# Patient Record
Sex: MALE | Race: WHITE | Employment: FULL TIME | URBAN - METROPOLITAN AREA
[De-identification: names, ages, dates, MRNs, and addresses within clinical notes are randomized per-mention and may not be internally consistent; named-entity substitution may affect disease eponyms.]

---

## 2017-06-07 ENCOUNTER — OFFICE VISIT (OUTPATIENT)
Dept: INTERNAL MEDICINE CLINIC | Age: 57
End: 2017-06-07

## 2017-06-07 VITALS
WEIGHT: 228 LBS | RESPIRATION RATE: 16 BRPM | BODY MASS INDEX: 29.26 KG/M2 | DIASTOLIC BLOOD PRESSURE: 70 MMHG | SYSTOLIC BLOOD PRESSURE: 122 MMHG | TEMPERATURE: 98.5 F | HEIGHT: 74 IN | HEART RATE: 70 BPM

## 2017-06-07 DIAGNOSIS — Z00.00 ANNUAL PHYSICAL EXAM: Primary | ICD-10-CM

## 2017-06-07 LAB
BASOPHILS ABSOLUTE: 0 K/UL (ref 0–0.2)
BASOPHILS RELATIVE PERCENT: 0.7 %
BILIRUBIN, POC: NORMAL
BLOOD URINE, POC: NORMAL
CHOLESTEROL, TOTAL: 201 MG/DL (ref 0–199)
CLARITY, POC: CLEAR
COLOR, POC: YELLOW
EOSINOPHILS ABSOLUTE: 0.2 K/UL (ref 0–0.6)
EOSINOPHILS RELATIVE PERCENT: 3.8 %
GLUCOSE URINE, POC: NORMAL
HCT VFR BLD CALC: 44.2 % (ref 40.5–52.5)
HDLC SERPL-MCNC: 35 MG/DL (ref 40–60)
HEMOGLOBIN: 14.4 G/DL (ref 13.5–17.5)
KETONES, POC: NORMAL
LDL CHOLESTEROL CALCULATED: 107 MG/DL
LEUKOCYTE EST, POC: NORMAL
LYMPHOCYTES ABSOLUTE: 1.3 K/UL (ref 1–5.1)
LYMPHOCYTES RELATIVE PERCENT: 25.6 %
MCH RBC QN AUTO: 29.7 PG (ref 26–34)
MCHC RBC AUTO-ENTMCNC: 32.5 G/DL (ref 31–36)
MCV RBC AUTO: 91.4 FL (ref 80–100)
MONOCYTES ABSOLUTE: 0.4 K/UL (ref 0–1.3)
MONOCYTES RELATIVE PERCENT: 8.6 %
NEUTROPHILS ABSOLUTE: 3 K/UL (ref 1.7–7.7)
NEUTROPHILS RELATIVE PERCENT: 61.3 %
NITRITE, POC: NORMAL
PDW BLD-RTO: 13.8 % (ref 12.4–15.4)
PH, POC: 7
PLATELET # BLD: 196 K/UL (ref 135–450)
PMV BLD AUTO: 10.9 FL (ref 5–10.5)
PROTEIN, POC: NORMAL
RBC # BLD: 4.83 M/UL (ref 4.2–5.9)
SPECIFIC GRAVITY, POC: 1.02
TRIGL SERPL-MCNC: 294 MG/DL (ref 0–150)
TSH SERPL DL<=0.05 MIU/L-ACNC: 1.62 UIU/ML (ref 0.27–4.2)
UROBILINOGEN, POC: 1
VLDLC SERPL CALC-MCNC: 59 MG/DL
WBC # BLD: 4.9 K/UL (ref 4–11)

## 2017-06-07 PROCEDURE — 81002 URINALYSIS NONAUTO W/O SCOPE: CPT | Performed by: INTERNAL MEDICINE

## 2017-06-07 PROCEDURE — 90471 IMMUNIZATION ADMIN: CPT | Performed by: INTERNAL MEDICINE

## 2017-06-07 PROCEDURE — 90715 TDAP VACCINE 7 YRS/> IM: CPT | Performed by: INTERNAL MEDICINE

## 2017-06-07 PROCEDURE — 36415 COLL VENOUS BLD VENIPUNCTURE: CPT | Performed by: INTERNAL MEDICINE

## 2017-06-07 PROCEDURE — 99396 PREV VISIT EST AGE 40-64: CPT | Performed by: INTERNAL MEDICINE

## 2017-06-07 ASSESSMENT — ENCOUNTER SYMPTOMS
GASTROINTESTINAL NEGATIVE: 1
RESPIRATORY NEGATIVE: 1

## 2017-06-07 ASSESSMENT — PATIENT HEALTH QUESTIONNAIRE - PHQ9
SUM OF ALL RESPONSES TO PHQ QUESTIONS 1-9: 0
1. LITTLE INTEREST OR PLEASURE IN DOING THINGS: 0
SUM OF ALL RESPONSES TO PHQ9 QUESTIONS 1 & 2: 0
2. FEELING DOWN, DEPRESSED OR HOPELESS: 0

## 2017-06-09 LAB — PROSTATE SPECIFIC ANTIGEN: 0.95 NG/ML (ref 0–4)

## 2018-01-12 ENCOUNTER — OFFICE VISIT (OUTPATIENT)
Dept: INTERNAL MEDICINE CLINIC | Age: 58
End: 2018-01-12

## 2018-01-12 VITALS
RESPIRATION RATE: 12 BRPM | HEART RATE: 66 BPM | WEIGHT: 234 LBS | DIASTOLIC BLOOD PRESSURE: 72 MMHG | SYSTOLIC BLOOD PRESSURE: 118 MMHG | HEIGHT: 74 IN | TEMPERATURE: 97.8 F | OXYGEN SATURATION: 95 % | BODY MASS INDEX: 30.03 KG/M2

## 2018-01-12 DIAGNOSIS — J01.90 ACUTE BACTERIAL SINUSITIS: Primary | ICD-10-CM

## 2018-01-12 DIAGNOSIS — B96.89 ACUTE BACTERIAL SINUSITIS: Primary | ICD-10-CM

## 2018-01-12 PROCEDURE — 99213 OFFICE O/P EST LOW 20 MIN: CPT | Performed by: NURSE PRACTITIONER

## 2018-01-12 RX ORDER — GREEN TEA/HOODIA GORDONII 315-12.5MG
1 CAPSULE ORAL DAILY
Qty: 30 TABLET | Refills: 0 | Status: SHIPPED | OUTPATIENT
Start: 2018-01-12 | End: 2019-06-25

## 2018-01-12 RX ORDER — LEVOFLOXACIN 500 MG/1
500 TABLET, FILM COATED ORAL DAILY
Qty: 10 TABLET | Refills: 0 | Status: SHIPPED | OUTPATIENT
Start: 2018-01-12 | End: 2018-01-22

## 2018-01-12 ASSESSMENT — ENCOUNTER SYMPTOMS
SINUS PAIN: 1
EYE PAIN: 0
TROUBLE SWALLOWING: 0
SHORTNESS OF BREATH: 0
NAUSEA: 0
VOMITING: 0
CONSTIPATION: 0
SORE THROAT: 1
ABDOMINAL PAIN: 0
PHOTOPHOBIA: 0
DIARRHEA: 0
RHINORRHEA: 1
SINUS PRESSURE: 1
BACK PAIN: 0
WHEEZING: 0
COUGH: 0
CHEST TIGHTNESS: 0

## 2018-01-12 NOTE — PROGRESS NOTES
4.83     Hemoglobin 06/07/2017 14.4     Hematocrit 06/07/2017 44.2     MCV 06/07/2017 91.4     MCH 06/07/2017 29.7     MCHC 06/07/2017 32.5     RDW 06/07/2017 13.8     Platelets 20/88/8449 196     MPV 06/07/2017 10.9*    Neutrophils % 06/07/2017 61.3     Lymphocytes % 06/07/2017 25.6     Monocytes % 06/07/2017 8.6     Eosinophils % 06/07/2017 3.8     Basophils % 06/07/2017 0.7     Neutrophils # 06/07/2017 3.0     Lymphocytes # 06/07/2017 1.3     Monocytes # 06/07/2017 0.4     Eosinophils # 06/07/2017 0.2     Basophils # 06/07/2017 0.0     Cholesterol, Total 06/07/2017 201*    Triglycerides 06/07/2017 294*    HDL 06/07/2017 35*    LDL Calculated 06/07/2017 107*    VLDL Cholesterol Calcula* 06/07/2017 59     TSH 06/07/2017 1.62     PSA 06/09/2017 0.95          Physical Exam   Constitutional: He is oriented to person, place, and time. He appears well-developed and well-nourished. HENT:   Head: Normocephalic. Right Ear: No tenderness. Tympanic membrane is not erythematous. Left Ear: There is tenderness. Tympanic membrane is not erythematous. Nose: Right sinus exhibits maxillary sinus tenderness and frontal sinus tenderness. Left sinus exhibits maxillary sinus tenderness and frontal sinus tenderness. Mouth/Throat: No oropharyngeal exudate. Eyes: Pupils are equal, round, and reactive to light. Right eye exhibits no discharge. Neck: Normal range of motion. No JVD present. No tracheal deviation present. No thyromegaly present. Cardiovascular: Normal rate and regular rhythm. No murmur heard. Pulmonary/Chest: Breath sounds normal. No respiratory distress. He has no wheezes. He has no rales. He exhibits no tenderness. Abdominal: Soft. Bowel sounds are normal. He exhibits no distension and no mass. There is no tenderness. There is no rebound and no guarding. Musculoskeletal: Normal range of motion. He exhibits no edema or tenderness.    Lymphadenopathy:     He has no cervical

## 2018-01-17 ENCOUNTER — TELEPHONE (OUTPATIENT)
Dept: ORTHOPEDIC SURGERY | Age: 58
End: 2018-01-17

## 2018-02-02 ENCOUNTER — OFFICE VISIT (OUTPATIENT)
Dept: ORTHOPEDIC SURGERY | Age: 58
End: 2018-02-02

## 2018-02-02 VITALS
HEART RATE: 57 BPM | SYSTOLIC BLOOD PRESSURE: 119 MMHG | BODY MASS INDEX: 28.88 KG/M2 | DIASTOLIC BLOOD PRESSURE: 81 MMHG | WEIGHT: 225 LBS | HEIGHT: 74 IN

## 2018-02-02 DIAGNOSIS — M17.12 ARTHRITIS OF LEFT KNEE: ICD-10-CM

## 2018-02-02 DIAGNOSIS — G89.29 CHRONIC PAIN OF LEFT KNEE: Primary | ICD-10-CM

## 2018-02-02 DIAGNOSIS — M70.42 PREPATELLAR BURSITIS OF LEFT KNEE: ICD-10-CM

## 2018-02-02 DIAGNOSIS — M25.562 CHRONIC PAIN OF LEFT KNEE: Primary | ICD-10-CM

## 2018-02-02 PROCEDURE — 99203 OFFICE O/P NEW LOW 30 MIN: CPT | Performed by: ORTHOPAEDIC SURGERY

## 2018-02-02 ASSESSMENT — ENCOUNTER SYMPTOMS
BACK PAIN: 0
GASTROINTESTINAL NEGATIVE: 1
RESPIRATORY NEGATIVE: 1
EYES NEGATIVE: 1

## 2018-02-02 NOTE — PROGRESS NOTES
Subjective:      Patient ID: Yogesh Horton is a 62 y.o. male. LISA Horton presents today for evaluation of left knee pain. He has had knee arthroscopies in the past.  The right knee underwent arthroscopy in about 2009 the left some point in the 80s or 90s. He received a cortisone injection back in 2012 in the right knee. For the most part his knees function fine. 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 Typically he wears kneepads. In the end of November 2017 he did an installation in [place]. Upon his return to [place] he had significant left knee pain. He also noticed swelling. The pain has subsided but is concerned about the swelling. He says his knee looks \"nasty\". His swelling is anterior. He has not had treatment at all. He typically wears kneepads at work. He is otherwise healthy. Review of Systems   Constitutional: Negative. HENT: Negative. Eyes: Negative. Respiratory: Negative. Cardiovascular: Negative. Gastrointestinal: Negative. Endocrine: Negative. Genitourinary: Negative. Musculoskeletal: Positive for arthralgias. Negative for back pain and neck pain. Skin: Negative. Allergic/Immunologic: Negative for food allergies. Neurological: Negative. Hematological: Negative. Psychiatric/Behavioral: Negative. Objective:   Physical Exam  General Exam:    Vitals: Blood pressure 119/81, pulse 57, height 6' 2\" (1.88 m), weight 225 lb (102.1 kg). Constitutional: Patient is adequately groomed with no evidence of malnutrition  Mental Status: The patient is oriented to time, place and person. The patient's mood and affect are appropriate. Gait:  Patient walks with normal gait and station. Lymphatic: The lymphatic examination bilaterally reveals all areas to be without enlargement or induration. Vascular: Examination reveals no swelling or calf tenderness.   Peripheral pulses are palpable and 2+.  Neurological: The patient has good coordination. There is no weakness or sensory deficit. Skin:    Head/Neck: inspection reveals no rashes, ulcerations or lesions. Trunk:  inspection reveals no rashes, ulcerations or lesions. Right Lower Extremity: inspection reveals no rashes, ulcerations or lesions. Left Lower Extremity: inspection reveals no rashes, ulcerations or lesions. Examination of the bilateral hips reveals normal flexion and extension. There is no restriction in rotation. There is no tenderness to palpation anteriorly posteriorly or laterally. Right knee exam shows significant patellofemoral crepitation. He has varus alignment. He has no medial or lateral joint line pain. He is ligamentously stable. Calf is soft without DVT. There is no bursitis. Left knee shows some mild prepatellar bursitis. There is no warmth or erythema. He has moderate patellofemoral crepitation. His varus alignment. He has trace medial joint line discomfort. He has no lateral sided pain. He is ligamentously stable. Range of motion is not restricted. Calf soft without DVT. X-rays were obtained today. AP standing, PA flexed, and merchant views of the bilateral knees as well as a lateral of the left knee. These demonstrate:  Significant medial and patellofemoral arthritis of both knees. Assessment:      His pain is attributed to his arthritic change. The ongoing mild swelling is consistent with prepatellar bursitis        Plan: At this point, no bleeding intervention is warranted. I would not recommend an aspiration of the bursa as the swelling is mild and unlikely to provide significant improvement. He is currently asymptomatic from his arthritis. He will contact us if he feels the need for further intervention. He was reassured that he did not need to have his knee drained. He will follow up with me on an as-needed basis. This note was created using voice recognition software. It has been proofread, but occasionally errors remain. Please disregard these errors. They will be corrected as they are noted.

## 2018-03-24 ENCOUNTER — OFFICE VISIT (OUTPATIENT)
Dept: INTERNAL MEDICINE CLINIC | Age: 58
End: 2018-03-24

## 2018-03-24 VITALS
BODY MASS INDEX: 29.92 KG/M2 | TEMPERATURE: 98.2 F | SYSTOLIC BLOOD PRESSURE: 118 MMHG | HEART RATE: 64 BPM | DIASTOLIC BLOOD PRESSURE: 74 MMHG | OXYGEN SATURATION: 96 % | WEIGHT: 233 LBS

## 2018-03-24 DIAGNOSIS — H69.83 DYSFUNCTION OF BOTH EUSTACHIAN TUBES: Primary | ICD-10-CM

## 2018-03-24 DIAGNOSIS — R09.81 SINUS CONGESTION: ICD-10-CM

## 2018-03-24 DIAGNOSIS — R09.82 POST-NASAL DRIP: ICD-10-CM

## 2018-03-24 DIAGNOSIS — T70.0XXA: ICD-10-CM

## 2018-03-24 PROCEDURE — 99214 OFFICE O/P EST MOD 30 MIN: CPT | Performed by: INTERNAL MEDICINE

## 2018-03-24 RX ORDER — METHYLPREDNISOLONE 4 MG/1
TABLET ORAL
Qty: 1 KIT | Refills: 0 | Status: SHIPPED | OUTPATIENT
Start: 2018-03-24 | End: 2018-03-30

## 2018-03-24 RX ORDER — GUAIFENESIN AND PSEUDOEPHEDRINE HCL 1200; 120 MG/1; MG/1
1 TABLET, EXTENDED RELEASE ORAL 2 TIMES DAILY
Qty: 30 TABLET | Refills: 1 | Status: SHIPPED | OUTPATIENT
Start: 2018-03-24 | End: 2019-06-25

## 2018-03-24 ASSESSMENT — ENCOUNTER SYMPTOMS
SORE THROAT: 0
WHEEZING: 0
ABDOMINAL PAIN: 0
DIARRHEA: 0
SINUS PRESSURE: 1
VOMITING: 0
NAUSEA: 0
TROUBLE SWALLOWING: 0
RHINORRHEA: 1
SHORTNESS OF BREATH: 0

## 2018-03-24 NOTE — PROGRESS NOTES
4.0 - 11.0 K/uL Final    RBC 06/07/2017 4.83  4.20 - 5.90 M/uL Final    Hemoglobin 06/07/2017 14.4  13.5 - 17.5 g/dL Final    Hematocrit 06/07/2017 44.2  40.5 - 52.5 % Final    MCV 06/07/2017 91.4  80.0 - 100.0 fL Final    MCH 06/07/2017 29.7  26.0 - 34.0 pg Final    MCHC 06/07/2017 32.5  31.0 - 36.0 g/dL Final    RDW 06/07/2017 13.8  12.4 - 15.4 % Final    Platelets 75/65/7689 196  135 - 450 K/uL Final    MPV 06/07/2017 10.9* 5.0 - 10.5 fL Final    Neutrophils % 06/07/2017 61.3  % Final    Lymphocytes % 06/07/2017 25.6  % Final    Monocytes % 06/07/2017 8.6  % Final    Eosinophils % 06/07/2017 3.8  % Final    Basophils % 06/07/2017 0.7  % Final    Neutrophils # 06/07/2017 3.0  1.7 - 7.7 K/uL Final    Lymphocytes # 06/07/2017 1.3  1.0 - 5.1 K/uL Final    Monocytes # 06/07/2017 0.4  0.0 - 1.3 K/uL Final    Eosinophils # 06/07/2017 0.2  0.0 - 0.6 K/uL Final    Basophils # 06/07/2017 0.0  0.0 - 0.2 K/uL Final    Cholesterol, Total 06/07/2017 201* 0 - 199 mg/dL Final    Triglycerides 06/07/2017 294* 0 - 150 mg/dL Final    HDL 06/07/2017 35* 40 - 60 mg/dL Final    LDL Calculated 06/07/2017 107* <100 mg/dL Final    VLDL Cholesterol Calculated 06/07/2017 59  Not Established mg/dL Final    TSH 06/07/2017 1.62  0.27 - 4.20 uIU/mL Final    PSA 06/07/2017 0.95  0.00 - 4.00 ng/mL Final       Family History   Problem Relation Age of Onset    No Known Problems Mother     No Known Problems Father     No Known Problems Sister     No Known Problems Brother     No Known Problems Maternal Aunt     No Known Problems Maternal Uncle     No Known Problems Paternal Aunt     No Known Problems Paternal Uncle     No Known Problems Maternal Grandmother     No Known Problems Maternal Grandfather     No Known Problems Paternal Grandmother     No Known Problems Paternal Grandfather     No Known Problems Other     Anesth Problems Neg Hx     Broken Bones Neg Hx     Cancer Neg Hx     Clotting Disorder Neg Hx     Collagen Disease Neg Hx     Diabetes Neg Hx     Dislocations Neg Hx     Osteoporosis Neg Hx     Rheumatologic Disease Neg Hx     Scoliosis Neg Hx     Severe Sprains Neg Hx        Current Outpatient Prescriptions   Medication Sig Dispense Refill    methylPREDNISolone (MEDROL, JACINDA,) 4 MG tablet Take by mouth. 1 kit 0    Pseudoephedrine-guaiFENesin 120-1200 MG TB12 Take 1 tablet by mouth 2 times daily 30 tablet 1    Multiple Vitamins-Minerals (MULTIVITAMIN PO) Take 1 tablet by mouth daily.  Lactobacillus (PROBIOTIC ACIDOPHILUS) TABS Take 1 tablet by mouth daily 30 tablet 0     No current facility-administered medications for this visit. No Known Allergies    Review of Systems   Constitutional: Negative for chills, fatigue and fever. HENT: Positive for congestion, ear pain, postnasal drip, rhinorrhea, sinus pressure and tinnitus. Negative for sore throat and trouble swallowing. Eyes: Negative for visual disturbance. Respiratory: Negative for shortness of breath and wheezing. Cardiovascular: Negative for chest pain and palpitations. Gastrointestinal: Negative for abdominal pain, diarrhea, nausea and vomiting. Endocrine: Negative for cold intolerance and heat intolerance. Genitourinary: Negative for difficulty urinating and dysuria. Neurological: Negative for dizziness, weakness and numbness. Psychiatric/Behavioral: Negative for agitation, decreased concentration and suicidal ideas. The patient is not nervous/anxious. All other systems reviewed and are negative. Vitals:  /74 (Site: Right Arm, Cuff Size: Large Adult)   Pulse 64   Temp 98.2 °F (36.8 °C) (Oral)   Wt 233 lb (105.7 kg)   SpO2 96%   BMI 29.92 kg/m²     Physical Exam   Constitutional: He is oriented to person, place, and time. He appears well-developed and well-nourished. HENT:   Head: Normocephalic and atraumatic. Nose: Mucosal edema and rhinorrhea present.    Eyes: Conjunctivae and lids are

## 2019-06-24 ENCOUNTER — TELEPHONE (OUTPATIENT)
Dept: INTERNAL MEDICINE CLINIC | Age: 59
End: 2019-06-24

## 2019-06-24 NOTE — TELEPHONE ENCOUNTER
Attempted to contact patient on 6/24/2019. Result: Line busy x2. Pre-Visit planning not completed.            [facility]  Patient Services Specialist  [phone]

## 2019-06-25 ENCOUNTER — OFFICE VISIT (OUTPATIENT)
Dept: INTERNAL MEDICINE CLINIC | Age: 59
End: 2019-06-25
Payer: COMMERCIAL

## 2019-06-25 VITALS
BODY MASS INDEX: 29 KG/M2 | SYSTOLIC BLOOD PRESSURE: 106 MMHG | DIASTOLIC BLOOD PRESSURE: 68 MMHG | WEIGHT: 226 LBS | TEMPERATURE: 98.5 F | HEIGHT: 74 IN | OXYGEN SATURATION: 96 % | HEART RATE: 72 BPM

## 2019-06-25 DIAGNOSIS — E78.2 MIXED HYPERLIPIDEMIA: ICD-10-CM

## 2019-06-25 DIAGNOSIS — Z00.00 PHYSICAL EXAM: Primary | ICD-10-CM

## 2019-06-25 PROCEDURE — 99396 PREV VISIT EST AGE 40-64: CPT | Performed by: INTERNAL MEDICINE

## 2019-06-25 ASSESSMENT — ENCOUNTER SYMPTOMS
DIARRHEA: 1
RESPIRATORY NEGATIVE: 1

## 2019-07-25 ENCOUNTER — NURSE ONLY (OUTPATIENT)
Dept: INTERNAL MEDICINE CLINIC | Age: 59
End: 2019-07-25
Payer: COMMERCIAL

## 2019-07-25 DIAGNOSIS — Z12.5 PROSTATE CANCER SCREENING: ICD-10-CM

## 2019-07-25 DIAGNOSIS — Z00.00 PHYSICAL EXAM: Primary | ICD-10-CM

## 2019-07-25 DIAGNOSIS — E78.2 MIXED HYPERLIPIDEMIA: ICD-10-CM

## 2019-07-25 LAB
A/G RATIO: 2.1 (ref 1.1–2.2)
ALBUMIN SERPL-MCNC: 4.6 G/DL (ref 3.4–5)
ALP BLD-CCNC: 55 U/L (ref 40–129)
ALT SERPL-CCNC: 20 U/L (ref 10–40)
ANION GAP SERPL CALCULATED.3IONS-SCNC: 15 MMOL/L (ref 3–16)
AST SERPL-CCNC: 19 U/L (ref 15–37)
BASOPHILS ABSOLUTE: 0 K/UL (ref 0–0.2)
BASOPHILS RELATIVE PERCENT: 0.4 %
BILIRUB SERPL-MCNC: 0.3 MG/DL (ref 0–1)
BUN BLDV-MCNC: 15 MG/DL (ref 7–20)
CALCIUM SERPL-MCNC: 9.2 MG/DL (ref 8.3–10.6)
CHLORIDE BLD-SCNC: 102 MMOL/L (ref 99–110)
CHOLESTEROL, TOTAL: 160 MG/DL (ref 0–199)
CO2: 24 MMOL/L (ref 21–32)
CREAT SERPL-MCNC: 1.1 MG/DL (ref 0.9–1.3)
EOSINOPHILS ABSOLUTE: 0 K/UL (ref 0–0.6)
EOSINOPHILS RELATIVE PERCENT: 0.3 %
GFR AFRICAN AMERICAN: >60
GFR NON-AFRICAN AMERICAN: >60
GLOBULIN: 2.2 G/DL
GLUCOSE BLD-MCNC: 94 MG/DL (ref 70–99)
HCT VFR BLD CALC: 42.1 % (ref 40.5–52.5)
HDLC SERPL-MCNC: 38 MG/DL (ref 40–60)
HEMOGLOBIN: 14.4 G/DL (ref 13.5–17.5)
LDL CHOLESTEROL CALCULATED: 103 MG/DL
LYMPHOCYTES ABSOLUTE: 0.9 K/UL (ref 1–5.1)
LYMPHOCYTES RELATIVE PERCENT: 18.1 %
MCH RBC QN AUTO: 30.8 PG (ref 26–34)
MCHC RBC AUTO-ENTMCNC: 34.3 G/DL (ref 31–36)
MCV RBC AUTO: 90 FL (ref 80–100)
MONOCYTES ABSOLUTE: 0.8 K/UL (ref 0–1.3)
MONOCYTES RELATIVE PERCENT: 15 %
NEUTROPHILS ABSOLUTE: 3.5 K/UL (ref 1.7–7.7)
NEUTROPHILS RELATIVE PERCENT: 66.2 %
PDW BLD-RTO: 13.2 % (ref 12.4–15.4)
PLATELET # BLD: 162 K/UL (ref 135–450)
PMV BLD AUTO: 9.5 FL (ref 5–10.5)
POTASSIUM SERPL-SCNC: 4 MMOL/L (ref 3.5–5.1)
PROSTATE SPECIFIC ANTIGEN: 0.96 NG/ML (ref 0–4)
RBC # BLD: 4.68 M/UL (ref 4.2–5.9)
SODIUM BLD-SCNC: 141 MMOL/L (ref 136–145)
TOTAL PROTEIN: 6.8 G/DL (ref 6.4–8.2)
TRIGL SERPL-MCNC: 96 MG/DL (ref 0–150)
VLDLC SERPL CALC-MCNC: 19 MG/DL
WBC # BLD: 5.2 K/UL (ref 4–11)

## 2019-07-25 PROCEDURE — 36415 COLL VENOUS BLD VENIPUNCTURE: CPT | Performed by: INTERNAL MEDICINE

## 2019-09-13 ENCOUNTER — OFFICE VISIT (OUTPATIENT)
Dept: ORTHOPEDIC SURGERY | Age: 59
End: 2019-09-13
Payer: COMMERCIAL

## 2019-09-13 VITALS
DIASTOLIC BLOOD PRESSURE: 72 MMHG | SYSTOLIC BLOOD PRESSURE: 108 MMHG | HEIGHT: 74 IN | WEIGHT: 220 LBS | BODY MASS INDEX: 28.23 KG/M2 | HEART RATE: 69 BPM

## 2019-09-13 DIAGNOSIS — G89.29 CHRONIC PAIN OF LEFT KNEE: Primary | ICD-10-CM

## 2019-09-13 DIAGNOSIS — G89.29 CHRONIC PAIN OF RIGHT KNEE: ICD-10-CM

## 2019-09-13 DIAGNOSIS — M25.562 CHRONIC PAIN OF LEFT KNEE: Primary | ICD-10-CM

## 2019-09-13 DIAGNOSIS — M17.12 ARTHRITIS OF KNEE, LEFT: ICD-10-CM

## 2019-09-13 DIAGNOSIS — M25.561 CHRONIC PAIN OF RIGHT KNEE: ICD-10-CM

## 2019-09-13 DIAGNOSIS — M17.11 ARTHRITIS OF RIGHT KNEE: ICD-10-CM

## 2019-09-13 PROCEDURE — 20610 DRAIN/INJ JOINT/BURSA W/O US: CPT | Performed by: ORTHOPAEDIC SURGERY

## 2019-09-13 PROCEDURE — 99214 OFFICE O/P EST MOD 30 MIN: CPT | Performed by: ORTHOPAEDIC SURGERY

## 2019-09-13 RX ORDER — METHYLPREDNISOLONE ACETATE 40 MG/ML
80 INJECTION, SUSPENSION INTRA-ARTICULAR; INTRALESIONAL; INTRAMUSCULAR; SOFT TISSUE ONCE
Status: COMPLETED | OUTPATIENT
Start: 2019-09-13 | End: 2019-09-13

## 2019-09-13 RX ADMIN — METHYLPREDNISOLONE ACETATE 80 MG: 40 INJECTION, SUSPENSION INTRA-ARTICULAR; INTRALESIONAL; INTRAMUSCULAR; SOFT TISSUE at 08:48

## 2019-09-13 ASSESSMENT — ENCOUNTER SYMPTOMS
EYES NEGATIVE: 1
ALLERGIC/IMMUNOLOGIC NEGATIVE: 1
GASTROINTESTINAL NEGATIVE: 1
RESPIRATORY NEGATIVE: 1

## 2019-11-01 ENCOUNTER — TELEPHONE (OUTPATIENT)
Dept: ORTHOPEDIC SURGERY | Age: 59
End: 2019-11-01

## 2019-11-01 DIAGNOSIS — M17.11 ARTHRITIS OF RIGHT KNEE: ICD-10-CM

## 2019-11-01 DIAGNOSIS — M25.562 ACUTE PAIN OF LEFT KNEE: ICD-10-CM

## 2019-11-01 DIAGNOSIS — M25.561 ACUTE PAIN OF RIGHT KNEE: ICD-10-CM

## 2019-11-01 DIAGNOSIS — M17.12 ARTHRITIS OF KNEE, LEFT: Primary | ICD-10-CM

## 2019-11-07 ENCOUNTER — OFFICE VISIT (OUTPATIENT)
Dept: ORTHOPEDIC SURGERY | Age: 59
End: 2019-11-07
Payer: COMMERCIAL

## 2019-11-07 VITALS
HEIGHT: 74 IN | DIASTOLIC BLOOD PRESSURE: 84 MMHG | SYSTOLIC BLOOD PRESSURE: 129 MMHG | BODY MASS INDEX: 28.23 KG/M2 | WEIGHT: 220 LBS | HEART RATE: 58 BPM | TEMPERATURE: 97.6 F

## 2019-11-07 DIAGNOSIS — M17.11 ARTHRITIS OF RIGHT KNEE: ICD-10-CM

## 2019-11-07 DIAGNOSIS — M17.12 ARTHRITIS OF KNEE, LEFT: Primary | ICD-10-CM

## 2019-11-07 PROCEDURE — 99214 OFFICE O/P EST MOD 30 MIN: CPT | Performed by: ORTHOPAEDIC SURGERY

## 2019-11-21 ENCOUNTER — TELEPHONE (OUTPATIENT)
Dept: ORTHOPEDIC SURGERY | Age: 59
End: 2019-11-21

## 2019-12-11 ENCOUNTER — TELEPHONE (OUTPATIENT)
Dept: ORTHOPEDIC SURGERY | Age: 59
End: 2019-12-11

## 2020-01-06 ENCOUNTER — OFFICE VISIT (OUTPATIENT)
Dept: ORTHOPEDIC SURGERY | Age: 60
End: 2020-01-06
Payer: COMMERCIAL

## 2020-01-06 VITALS — TEMPERATURE: 97.4 F | WEIGHT: 220 LBS | BODY MASS INDEX: 28.23 KG/M2 | HEIGHT: 74 IN

## 2020-01-06 PROCEDURE — 20610 DRAIN/INJ JOINT/BURSA W/O US: CPT | Performed by: PHYSICIAN ASSISTANT

## 2020-01-06 PROCEDURE — 99213 OFFICE O/P EST LOW 20 MIN: CPT | Performed by: PHYSICIAN ASSISTANT

## 2020-01-06 NOTE — PROGRESS NOTES
This dictation was done with [employer] dictation and may contain mechanical errors related to translation. Temperature 97.4 °F (36.3 °C), temperature source Temporal, height 6' 2\" (1.88 m), weight 220 lb (99.8 kg). Subjective:  bilateral knee pain. He is here for  1st Eufflexxa injection for the  bilateral knee(s). Objective:   Temperature 97.4 °F (36.3 °C), temperature source Temporal, height 6' 2\" (1.88 m), weight 220 lb (99.8 kg). There is a milld joint effusion noted of the bilateral knee(s). There is moderate pain with range of motion testing. There is no significant  instability noted. Neuro exam grossly intact both lower extremities. Intact sensation to light touch. Motor exam 4+ to 5/5 in all major motor groups. Negative Ashton's sign. Skin is warm, dry and intact with out erythema or significant increased temperature around the knee joint(s). Assessment:  Degenerative Joint Disease of the bilateral Knee(s). Plan:  Discussed  injections including all  risks and benefits including increased pain, drug reaction, infection, bleeding, lack of improvement and  neurovascular injury. All the questions were answered. PROCEDURE NOTE:  PRE-PROCEDURE DIAGNOSIS: DJD knee  POST-PROCEDURE DIAGNOSIS: DJD knee    With the patient's permission, his bilaterally knee was prepped in standard sterile fashion with  Alcohol. The prefilled injection of the preferred Eufflexxa was injected into the bilaterally lateral joint space compartment without difficulty. The patient tolerated the procedure(s) well without difficulty. A band-aid was applied. POST-PROCEDURE INSTRUCTIONS GIVEN TO PATIENT: The patient was advised to ice the knee for 15-20 minutes to relieve any injection site related pain. Decrease activity for the next 24 to 48 hours.  May use prescription or OTC pain relievers as needed      FOLLOW-UP:   As directed or call or return to clinic if these symptoms worsen or fail to improve as anticipated. If at any time you are concerned you may contact the office for further instructions or care.

## 2020-01-13 ENCOUNTER — OFFICE VISIT (OUTPATIENT)
Dept: ORTHOPEDIC SURGERY | Age: 60
End: 2020-01-13
Payer: COMMERCIAL

## 2020-01-13 VITALS — TEMPERATURE: 97.5 F

## 2020-01-13 PROCEDURE — 20610 DRAIN/INJ JOINT/BURSA W/O US: CPT | Performed by: PHYSICIAN ASSISTANT

## 2020-01-13 NOTE — PROGRESS NOTES
This dictation was done with [employer] dictation and may contain mechanical errors related to translation. Temperature 97.5 °F (36.4 °C), temperature source Temporal.      Subjective:  bilateral knee pain. He is here for  2nd Eufflexxa injection for the  bilateral knee(s). Objective:   Temperature 97.5 °F (36.4 °C), temperature source Temporal.    There is a milld joint effusion noted of the bilateral knee(s). There is moderate pain with range of motion testing. There is no significant  instability noted. Neuro exam grossly intact both lower extremities. Intact sensation to light touch. Motor exam 4+ to 5/5 in all major motor groups. Negative Ashton's sign. Skin is warm, dry and intact with out erythema or significant increased temperature around the knee joint(s). Assessment:  Degenerative Joint Disease of the bilateral Knee(s). Plan:  Discussed  injections including all  risks and benefits including increased pain, drug reaction, infection, bleeding, lack of improvement and  neurovascular injury. All the questions were answered. PROCEDURE NOTE:  PRE-PROCEDURE DIAGNOSIS: DJD knee  POST-PROCEDURE DIAGNOSIS: DJD knee    With the patient's permission, his bilaterally knee was prepped in standard sterile fashion with  Alcohol. The prefilled injection of the preferred Eufflexxa was injected into the bilaterally lateral joint space compartment without difficulty. The patient tolerated the procedure(s) well without difficulty. A band-aid was applied. POST-PROCEDURE INSTRUCTIONS GIVEN TO PATIENT: The patient was advised to ice the knee for 15-20 minutes to relieve any injection site related pain. Decrease activity for the next 24 to 48 hours. May use prescription or OTC pain relievers as needed      FOLLOW-UP:   As directed or call or return to clinic if these symptoms worsen or fail to improve as anticipated.  If at any time you are concerned you may contact the office for further instructions or care.

## 2020-01-21 ENCOUNTER — OFFICE VISIT (OUTPATIENT)
Dept: ORTHOPEDIC SURGERY | Age: 60
End: 2020-01-21
Payer: COMMERCIAL

## 2020-01-21 VITALS — TEMPERATURE: 97.6 F

## 2020-01-21 PROCEDURE — 20610 DRAIN/INJ JOINT/BURSA W/O US: CPT | Performed by: PHYSICIAN ASSISTANT

## 2020-03-16 ENCOUNTER — OFFICE VISIT (OUTPATIENT)
Dept: ORTHOPEDIC SURGERY | Age: 60
End: 2020-03-16
Payer: COMMERCIAL

## 2020-03-16 VITALS — TEMPERATURE: 97.7 F

## 2020-03-16 PROCEDURE — 99214 OFFICE O/P EST MOD 30 MIN: CPT | Performed by: ORTHOPAEDIC SURGERY

## 2020-03-16 NOTE — PROGRESS NOTES
RAPT  RISK ASSESSMENT and PREDICTION TOOL    Name: Kelby Camacho  YOB: 1960  Surgeon: Dr Min Dejesus         Value Score    1). What is your age group? 50 - 65 years  = 2      66 - 75 years = 1     > 75 years = 0       Your score = 2   2). Gender? Male = 2     Female = 1       Your score = 2   3). How far on average can you walk? Two blocks or more (+/- rest) = 2    (a block is 200 teuiao=562 ft)  1 - 2 blocks (+/- rest) = 1     Housebound (most of time) = 0       Your score = 2   4). Which gait aid do you use? None = 2    (more often than not) Single-point stick = 1     Crutches/walker = 0       Your score = 2   5). Do you use community supports? None or one per week = 1    (home help, meals on wheels, district nursing) Two or more per week = 0       Your score = 1   6). Will you live with someone who can care for you after your operation? yes = 3     no = 0       Your score = 3    Your Total Score (out of 12) = 12       Key: Destination at discharge from acute care predicted by score.   Score < 6  = extended inpatient rehabilitation  Score 6 - 9  = additional intervention to discharge directly home (Rehab in the home)  Score > 9  = directly home      Patient's Preference Prediction Score Agreed destination   home 12 yes   Radha Ash  Date: 3/16/20

## 2020-03-16 NOTE — LETTER
[facility]  Surgery Scheduling Form:  [facility]    DEMOGRAPHICS:                                                                                                                Patient Name:  Mina He  Patient :  1960   Patient SS#:      Patient Phone:  [phone] (home) [phone] (work)                           Patient Address:  [address] [place] [address]    PCP:  Kiet Carter MD  Insurance:   Payor/Plan Subscr  Sex Relation Sub. Ins. ID Effective Group Num   1. Radha Washington* 1960 Male  [id number]                                    [id number]       DIAGNOSIS & PROCEDURE:                                                                                              Diagnosis:     Right arthritis knee      M17.11  Operation:  Right Total Knee Replacement robotic assisted   [id number]  Location:  [place] [address]  Surgeon:  Kiersten Espinoza. Olivia Laboy MD    SCHEDULING INFORMATION:                                                                                         .  Surgeon's Scheduling Instruction:  elective    Requested Date:    OR Time:   Patient Arrival Time:      OR Time Required:   80 MIN  Anesthesia:  Choice  Equipment: [place] TRACEE, advanced  Mini C-Arm:  No   Standard C-Arm:  No  Status:  Outpatient in bed                        COVID   NOT NEEDED   PAT Required:  Yes  Comments: NO femoral nerve block   ALLERGIES:Patient has no known allergies. Suzanne Ford.  Johny Wilson MD      3/16/20 2:38 PM  BILLING INFORMATION:                                                                                                     Procedure:       CPT Code Modifier      With Socorro Bautista, [address]            H&P AT:       PCP  XX                      URGENT CARE                    Merle Ash  RIGHT TOTAL KNEE REPLACEMENT   1960     PHYSICIANS ORDERS  HEIGHT:  6'2         WEIGHT:   439 ALLERGIES:Patient has no known allergies. SURG    11-4       JET VIRTUAL                               __________________________________________________________________  PRE-OP ORDERS:  ? CBC WITH DIFFERENTIAL                                                ? TYPE AND SCREEN                                                            ? HgB A1C                                                                               ? EKG                                                                                        ? NASAL CULUTRE MRSA  ? UAR/if positive repeat UAR on admission  ? BMP           ALBUMIN AND PREALBUMIN           VITAMIN D LEVELS  ? COAG PROFILE  ? SED RATE  ? PT/OT EVAL AND TEACHING  ? INSTRUCT PT TO STOP ALL NSAIDS, ASPIRIN, BLOOD THINNERS 7 DAYS PRIOR SURGERY  DAY OF SURGERY  ? CEFAZOLIN 2 GM IVPB; IF PATIENT WEIGHS > 80 KG AND SERUM CREATININE <2.5 mg/dl, GIVE 2 GM DOSE WITHIN 1 HOUR OF INCISION. ? IF THE PRE-OP NASAL CULTURE FOR MRSA WAS POSITIVE:   REPEAT NASAL SWAB ON ADMISSION AND ADMINISTER VANCOMYCIN 1 GM IVPB, REDUCE THE DOSE OF VANCOMYCIN  MG IVPB IF PT < 55 KG OR SERUM CREATININE > 2mg/dl; also to get Cefazolin 2 GM or wt based  ? All patients will receive preop Cefazolin 2 GM or wt based   ? APPLY KNEE HIGH ANTI-EMBOLIC AND PNEUMO-BOOTS TO UNOPERATIVE  LEG  ? CELEBREX  200 MG  ORALLY  DAY OF SURGERY  ? ROXICODONE 10MG  ORALLY DAY OF SURGERY  OTHER ORDERS:_______________________________________________________PHYSICIAN SIGNATURE: __ 3/16/20                                                                                                       2:38 PM  ________________________DATE:                          Supplemental Confidentiality & Payment Agreement & Supplemental HIPAA Notice for Shared Medical Appointments        During shared medical appointments, you will hear about other participants health issues and personal information.   As a matter of

## 2020-03-16 NOTE — LETTER
[facility]  Surgery Scheduling Form:  Critical access hospital    DEMOGRAPHICS:                                                                                                                Patient Name:  Ferdinand Niño  Patient :  1960   Patient SS#:      Patient Phone:  [phone] (home) [phone] (work)                           Patient Address:  [address] [place] [id number]    PCP:  Mateo Carranza MD  Insurance:   Payor/Plan Subscr  Sex Relation Sub. Ins. ID Effective Group Num   1. 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 Ravin Crockett MD    SCHEDULING INFORMATION:                                                                                         .  Surgeon's Scheduling Instruction:  Dec    Requested Date:    OR Time:   Patient Arrival Time:      OR Time Required:   90 min  Anesthesia:  Choice  Equipment: Jamilah TRACEE, advanced  Mini C-Arm:  No   Standard C-Arm:  No  Status:  Outpatient in bed                     COVID     PAT Required:  Yes  Comments: NO femoral nerve block   ALLERGIES:Patient has no known allergies. Shayna Whitney.  Danni Cavazos MD      3/16/20 2:39 PM  BILLING INFORMATION:                                                                                                     Procedure:       CPT Code Modifier            With Donny Velazquez, [address]      H&P AT:       PCP  XX                      URGENT CARE                    Ferdinand Niño  Left Total Knee Replacement robotic assisted                                                                           and manipulation right knee     1960     PHYSICIANS ORDERS  HEIGHT:   6'2             WEIGHT:   26      ALLERGIES:Patient has no known allergies. SURG       12-1    JET VIRTUAL                 __________________________________________________________________  PRE-OP ORDERS:  ? CBC WITH DIFFERENTIAL                                                ? TYPE AND SCREEN                                                            ? HgB A1C                                                                               ? EKG                                                                                        ? NASAL CULUTRE MRSA  ? UAR/if positive repeat UAR on admission  ? BMP           ALBUMIN AND PREALBUMIN           VITAMIN D LEVELS  ? COAG PROFILE  ? SED RATE  ? PT/OT EVAL AND TEACHING  ? INSTRUCT PT TO STOP ALL NSAIDS, ASPIRIN, BLOOD THINNERS 7 DAYS PRIOR SURGERY  DAY OF SURGERY  ? CEFAZOLIN 2 GM IVPB; IF PATIENT WEIGHS > 80 KG AND SERUM CREATININE <2.5 mg/dl, GIVE 2 GM DOSE WITHIN 1 HOUR OF INCISION. ? IF THE PRE-OP NASAL CULTURE FOR MRSA WAS POSITIVE:   REPEAT NASAL SWAB ON ADMISSION AND ADMINISTER VANCOMYCIN 1 GM IVPB, REDUCE THE DOSE OF VANCOMYCIN  MG IVPB IF PT < 55 KG OR SERUM CREATININE > 2mg/dl; also to get Cefazolin 2 GM or wt based  ? All patients will receive preop Cefazolin 2 GM or wt based   ? APPLY KNEE HIGH ANTI-EMBOLIC AND PNEUMO-BOOTS TO UNOPERATIVE  LEG  ? CELEBREX  200 MG  ORALLY  DAY OF SURGERY  ?  ROXICODONE 10MG  ORALLY DAY OF SURGERY  OTHER ORDERS:_______________________________________________________PHYSICIAN SIGNATURE: __ 3/16/20                                                                                                       2:39 PM  ________________________DATE:                          Supplemental Confidentiality & Payment Agreement & Supplemental HIPAA Patients Signature: ____________________________________________________         DATE: ____/____/___      Hernandez Kathryn / Support Persons Signature (if applicable) _________________________     DATE: __/__/__    **Each person will be asked to sign this commitment before each Shared Medical Appointment. Thank You ! SURGERY SCHEDULING TIMES                                                                                                                                                      Sadiq Quintero                                                                                       1960                                                                           SURGERY DATE: ___/___/___                                                                      SURGERY TIME:  ___:___ AM/PM                                                                      ARRIVAL TIME:   ___:___  AM/PM                                                                                                                        **PLEASE REPORT TO THE                                                       INFORMATION DESK IN THE MAIN LOBBY                                                          OF THE HOSPITAL.         [facility]                           JET INFO     PT NAME:  Jamilah Ash              Patient Phone:  [phone] (home) [phone] (work)                                          1960    PCP:  PCP:  Fe Beckman MD                APPT DATE:  ___/___/___      DATE:  3/16/20        H&P __________    LABS: _________                      NEEDED:  __________    EKG:   _________                     NEEDED:  __________      JET DATE:   _______/_______

## 2020-05-19 ENCOUNTER — HOSPITAL ENCOUNTER (OUTPATIENT)
Dept: PHYSICAL THERAPY | Age: 60
Setting detail: THERAPIES SERIES
Discharge: HOME | End: 2020-05-19
Payer: COMMERCIAL

## 2020-05-19 PROCEDURE — 97110 THERAPEUTIC EXERCISES: CPT

## 2020-05-19 PROCEDURE — 97161 PT EVAL LOW COMPLEX 20 MIN: CPT

## 2020-05-19 PROCEDURE — 97530 THERAPEUTIC ACTIVITIES: CPT

## 2020-05-19 NOTE — PLAN OF CARE
Outpatient Physical Therapy  [x] [facility]    Phone: [phone]   Fax: [phone]   [] [facility]  Phone: [phone]              Fax: [phone]  [] [facility]   Phone: [phone]   Fax: [phone]     To: Referring Practitioner: Dr. Broderick Drake      Patient: Chad Castaneda   : 1960   MRN: [id number]  Evaluation Date: 2020      Diagnosis Information:  · Diagnosis: B knee OA   · Treatment Diagnosis: knee pain, dec ROM, dec strength, dec amb      Physical Therapy Certification/Re-Certification Form  Dear Dr. Broderick Drake,   The following patient has been evaluated for physical therapy services and for therapy to continue, Medicare requires monthly physician review of the treatment plan. Please review the attached evaluation and/or summary of the patient's plan of care, and verify that you agree therapy should continue by signing the attached document and sending it back to our office. Plan of Care/Treatment to date:  [] Therapeutic Exercise    [] Modalities:  [] Therapeutic Activity     [] Ultrasound  [] Electrical Stimulation  [] Gait Training      [] Cervical Traction [] Lumbar Traction  [] Neuromuscular Re-education    [] Cold/hotpack [] Iontophoresis   [x] Instruction in HEP     Other:  [] Manual Therapy      []             [] Aquatic Therapy      []           ? Frequency/Duration: One session for Prehab evaluation and hep instruction  # Days per week: [x] 1 day # Weeks: [] 1 week [] 5 weeks     [] 2 days? [] 2 weeks [] 6 weeks     [] 3 days   [] 3 weeks [] 7 weeks     [] 4 days   [] 4 weeks [] 8 weeks    Rehab Potential: [] Excellent [x] Good [] Fair  [] Poor       Electronically signed by:  Penelope Morillo, [address]      If you have any questions or concerns, please don't hesitate to call.   Thank you for your referral.      Physician Signature:________________________________Date:__________________  By signing above, therapists plan is approved by physician

## 2020-05-19 NOTE — PROGRESS NOTES
Physical Therapy  Initial Assessment  Date: 2020  Patient Name: Luis Ibanez  MRN: [id number]  : 1960     Treatment Diagnosis: knee pain, dec ROM, dec strength, dec amb     Restrictions  Position Activity Restriction  Other position/activity restrictions: low fall risk     Subjective   General  Chart Reviewed: Yes  Patient assessed for rehabilitation services?: Yes  Additional Pertinent Hx: prior scope B knee  Family / Caregiver Present: No  Referring Practitioner: Dr. Nguyen aZrate  Referral Date : 20  Diagnosis: B knee OA  PT Visit Information  Onset Date: 20  PT Insurance Information: [employer] auth needed; 40 combo vpcy   Total # of Visits Approved: 1  Subjective  Subjective: Pt with ongoing h/o B knee pain worsening over the years. Pain is B knees and has a plan to have R TKR in 2020 and the L done in 2020. Pt c/o constant knee pain that varies with activities but can reach 8/10. Objective     Observation/Palpation  Observation: amb without AD     AROM RLE (degrees)  RLE General AROM: see Prehab form  AROM LLE (degrees)  LLE General AROM: see prehab form     Strength RLE  Comment: see Prehab form  Strength LLE  Comment: see prehab form         Assessment   Conditions Requiring Skilled Therapeutic Intervention  Body structures, Functions, Activity limitations: Decreased functional mobility ; Decreased high-level IADLs;Decreased ADL status; Decreased ROM; Decreased strength; Increased pain  Assessment: prior level of function: sleep, amb and all ADLs without inc pain; DX: B knee OA   Treatment Diagnosis: knee pain, dec ROM, dec strength, dec amb   Prognosis: Good  Decision Making: Low Complexity  History: see PMH  Exam: see eval  Clinical Presentation: stable   REQUIRES PT FOLLOW UP: No  Activity Tolerance  Activity Tolerance: Patient Tolerated treatment well         Plan   Plan  Times per week: 1 session for prehab hep     OutComes
TOTAL JOINT - PREHAB ASSESSMENT FORM    Date:  2020    Patient Name:  Ingrid Dan    :  1960  MEK:[id number]    Restrictions/Precautions: Position Activity Restriction  Other position/activity restrictions: low fall risk     Pertinent Medical History: Additional Pertinent Hx: prior scope B knee    Medical/Treatment Diagnosis Information:  · Diagnosis: B knee OA  · Treatment Diagnosis: knee pain, dec ROM, dec strength, dec amb     Insurance/Certification information:  PT Insurance Information: [employer] - [employer] auth needed; 40 combo vpcy   Physician Information:  Referring Practitioner: Dr. Braydon Lambert: [facility]     [] Total Hip Replacement [] Right  [] Left  DOS:   [x] Not yet scheduled  [x] Total Knee Replacement [x] Right  [] Left    [x] Prehab Eval  [] Prehab D/C  [] Post - OP Visit             Weeks from sx [] Post-op D/C    SUBJECTIVE:    Chart Reviewed: Yes  Patient assessed for rehabilitation services?: Yes  Referral Date : 20  Onset Date: 20  Subjective  Subjective: Pt with ongoing h/o B knee pain worsening over the years. Pain is B knees and has a plan to have R TKR in 2020 and the L done in 2020. Pt c/o constant knee pain that varies with activities but can reach 8/10. DME ASSESSMENT:   Current available equipment:    [] Std. Ashvin Templeton       [] Rolling walker      [] 4 wheeled walker     [] Epimenio Washington     [] Straight cane     [x] Crutches   [] Reacher            [] Sock Aid              [] Shower chair            [] Leg          [] Long handle shoe horn   [] Other:     Equipment needed at discharge from hospital:   [] Std. Walker       [x] Rolling walker      [] 4 wheeled walker     [] Epimenio Washington     [x] Straight cane     [] Crutches   [x] Reacher            [x] Sock Aid              [x] Shower chair            [x] Leg          [x] Long handle shoe horn   [] Other:                     SOCIAL ASSESSMENT:  1.
Other

## 2020-05-19 NOTE — FLOWSHEET NOTE
Physical Therapy Daily Treatment Note  Date:  2020    Patient Name:  Zoe Paul    :  1960  MRN: [id number]    Restrictions/Precautions: Position Activity Restriction  Other position/activity restrictions: low fall risk     Pertinent Medical History: Additional Pertinent Hx: prior scope B knee    Medical/Treatment Diagnosis Information:  · Diagnosis: B knee OA  · Treatment Diagnosis: knee pain, dec ROM, dec strength, dec amb     Insurance/Certification information:  PT Insurance Information: [employer] - [employer] auth needed; 40 combo vpcy   Physician Information:  Referring Practitioner: Dr. Rina Daniel of care signed (Y/N):  Sent to inbox    Visit# / total visits:   ( prehab )  Pain level: 8/10 max    Functional Outcomes Measure: at eval  Test: LEFS  Score: 44    History of Injury: Subjective  Subjective: Pt with ongoing h/o B knee pain worsening over the years. Pain is B knees and has a plan to have R TKR in 2020 and the L done in 2020. Pt c/o constant knee pain that varies with activities but can reach 8/10. Subjective:   See eval    Objective:   Observation:    Test measurements:      Exercises:  Exercise/Equipment Resistance/Repetitions Other comments             Prehab HEP Long sit HSS  Long sit calf str  Long sit QS  Heel slide with OP for flex  SLR  FAQ with red TB  Standing mini squats                                                               Other Therapeutic Activities:  Pt was educated on PT POC, Diagnosis, Prognosis, pathomechanics as well as frequency and duration of scheduling future physical therapy appointments. Time was also taken on this day to answer all patient questions and participation in PT. Reviewed appointment policy in detail with patient and patient verbalized understanding. Home Exercise Program: Patient was instructed in the above for HEP: pt without questions .  Patient verbalized/demonstrated understanding

## 2020-06-09 ENCOUNTER — TELEPHONE (OUTPATIENT)
Dept: ORTHOPEDIC SURGERY | Age: 60
End: 2020-06-09

## 2020-06-09 NOTE — TELEPHONE ENCOUNTER
Patient called back and would like to schedule. Right knee-2nd week in October  Let Naomi Lam Thanksgiving    I informed patient we would be in touch with him in the next few weeks for exact dates.

## 2020-06-09 NOTE — TELEPHONE ENCOUNTER
Called and LM for patient asking if he is ready to get back on the surgery schedule for total knee replacement.

## 2020-06-13 ENCOUNTER — HOSPITAL ENCOUNTER (EMERGENCY)
Age: 60
Discharge: HOME | End: 2020-06-13
Payer: COMMERCIAL

## 2020-06-13 ENCOUNTER — APPOINTMENT (OUTPATIENT)
Dept: CT IMAGING | Age: 60
End: 2020-06-13
Payer: COMMERCIAL

## 2020-06-13 VITALS
TEMPERATURE: 98.1 F | BODY MASS INDEX: 29.11 KG/M2 | HEIGHT: 74 IN | RESPIRATION RATE: 13 BRPM | OXYGEN SATURATION: 98 % | WEIGHT: 226.85 LBS | SYSTOLIC BLOOD PRESSURE: 120 MMHG | DIASTOLIC BLOOD PRESSURE: 75 MMHG | HEART RATE: 54 BPM

## 2020-06-13 LAB
A/G RATIO: 1.8 (ref 1.1–2.2)
ALBUMIN SERPL-MCNC: 4.2 G/DL (ref 3.4–5)
ALP BLD-CCNC: 52 U/L (ref 40–129)
ALT SERPL-CCNC: 13 U/L (ref 10–40)
ANION GAP SERPL CALCULATED.3IONS-SCNC: 8 MMOL/L (ref 3–16)
AST SERPL-CCNC: 15 U/L (ref 15–37)
BASOPHILS ABSOLUTE: 0 K/UL (ref 0–0.2)
BASOPHILS RELATIVE PERCENT: 0.4 %
BILIRUB SERPL-MCNC: 0.4 MG/DL (ref 0–1)
BILIRUBIN URINE: NEGATIVE
BLOOD, URINE: NEGATIVE
BUN BLDV-MCNC: 17 MG/DL (ref 7–20)
CALCIUM SERPL-MCNC: 8.5 MG/DL (ref 8.3–10.6)
CHLORIDE BLD-SCNC: 104 MMOL/L (ref 99–110)
CLARITY: CLEAR
CO2: 24 MMOL/L (ref 21–32)
COLOR: YELLOW
CREAT SERPL-MCNC: 0.9 MG/DL (ref 0.9–1.3)
EOSINOPHILS ABSOLUTE: 0.1 K/UL (ref 0–0.6)
EOSINOPHILS RELATIVE PERCENT: 1.8 %
EPITHELIAL CELLS, UA: 2 /HPF (ref 0–5)
GFR AFRICAN AMERICAN: >60
GFR NON-AFRICAN AMERICAN: >60
GLOBULIN: 2.3 G/DL
GLUCOSE BLD-MCNC: 117 MG/DL (ref 70–99)
GLUCOSE URINE: NEGATIVE MG/DL
HCT VFR BLD CALC: 44.2 % (ref 40.5–52.5)
HEMOGLOBIN: 14.7 G/DL (ref 13.5–17.5)
HYALINE CASTS: 3 /LPF (ref 0–8)
KETONES, URINE: NEGATIVE MG/DL
LEUKOCYTE ESTERASE, URINE: NEGATIVE
LIPASE: 25 U/L (ref 13–60)
LYMPHOCYTES ABSOLUTE: 0.9 K/UL (ref 1–5.1)
LYMPHOCYTES RELATIVE PERCENT: 18.1 %
MCH RBC QN AUTO: 30 PG (ref 26–34)
MCHC RBC AUTO-ENTMCNC: 33.3 G/DL (ref 31–36)
MCV RBC AUTO: 89.9 FL (ref 80–100)
MICROSCOPIC EXAMINATION: YES
MONOCYTES ABSOLUTE: 0.4 K/UL (ref 0–1.3)
MONOCYTES RELATIVE PERCENT: 7.3 %
NEUTROPHILS ABSOLUTE: 3.6 K/UL (ref 1.7–7.7)
NEUTROPHILS RELATIVE PERCENT: 72.4 %
NITRITE, URINE: NEGATIVE
PDW BLD-RTO: 13.4 % (ref 12.4–15.4)
PH UA: 5.5 (ref 5–8)
PLATELET # BLD: 183 K/UL (ref 135–450)
PMV BLD AUTO: 10 FL (ref 5–10.5)
POTASSIUM REFLEX MAGNESIUM: 4.6 MMOL/L (ref 3.5–5.1)
PROTEIN UA: ABNORMAL MG/DL
RBC # BLD: 4.91 M/UL (ref 4.2–5.9)
RBC UA: 2 /HPF (ref 0–4)
SODIUM BLD-SCNC: 136 MMOL/L (ref 136–145)
SPECIFIC GRAVITY UA: >1.03 (ref 1–1.03)
TOTAL PROTEIN: 6.5 G/DL (ref 6.4–8.2)
URINE REFLEX TO CULTURE: ABNORMAL
URINE TYPE: ABNORMAL
UROBILINOGEN, URINE: 0.2 E.U./DL
WBC # BLD: 5 K/UL (ref 4–11)
WBC UA: 1 /HPF (ref 0–5)

## 2020-06-13 PROCEDURE — 96374 THER/PROPH/DIAG INJ IV PUSH: CPT

## 2020-06-13 PROCEDURE — 81001 URINALYSIS AUTO W/SCOPE: CPT

## 2020-06-13 PROCEDURE — 83690 ASSAY OF LIPASE: CPT

## 2020-06-13 PROCEDURE — 6360000004 HC RX CONTRAST MEDICATION: Performed by: NURSE PRACTITIONER

## 2020-06-13 PROCEDURE — 80053 COMPREHEN METABOLIC PANEL: CPT

## 2020-06-13 PROCEDURE — 6360000002 HC RX W HCPCS: Performed by: NURSE PRACTITIONER

## 2020-06-13 PROCEDURE — 2580000003 HC RX 258: Performed by: NURSE PRACTITIONER

## 2020-06-13 PROCEDURE — 99283 EMERGENCY DEPT VISIT LOW MDM: CPT

## 2020-06-13 PROCEDURE — 96375 TX/PRO/DX INJ NEW DRUG ADDON: CPT

## 2020-06-13 PROCEDURE — 74177 CT ABD & PELVIS W/CONTRAST: CPT

## 2020-06-13 PROCEDURE — 85025 COMPLETE CBC W/AUTO DIFF WBC: CPT

## 2020-06-13 RX ORDER — KETOROLAC TROMETHAMINE 30 MG/ML
30 INJECTION, SOLUTION INTRAMUSCULAR; INTRAVENOUS ONCE
Status: COMPLETED | OUTPATIENT
Start: 2020-06-13 | End: 2020-06-13

## 2020-06-13 RX ORDER — 0.9 % SODIUM CHLORIDE 0.9 %
1000 INTRAVENOUS SOLUTION INTRAVENOUS ONCE
Status: COMPLETED | OUTPATIENT
Start: 2020-06-13 | End: 2020-06-13

## 2020-06-13 RX ORDER — MORPHINE SULFATE 4 MG/ML
4 INJECTION, SOLUTION INTRAMUSCULAR; INTRAVENOUS EVERY 30 MIN PRN
Status: DISCONTINUED | OUTPATIENT
Start: 2020-06-13 | End: 2020-06-13 | Stop reason: HOSPADM

## 2020-06-13 RX ORDER — ORPHENADRINE CITRATE 30 MG/ML
60 INJECTION INTRAMUSCULAR; INTRAVENOUS ONCE
Status: COMPLETED | OUTPATIENT
Start: 2020-06-13 | End: 2020-06-13

## 2020-06-13 RX ORDER — ONDANSETRON 2 MG/ML
4 INJECTION INTRAMUSCULAR; INTRAVENOUS EVERY 30 MIN PRN
Status: DISCONTINUED | OUTPATIENT
Start: 2020-06-13 | End: 2020-06-13 | Stop reason: HOSPADM

## 2020-06-13 RX ORDER — NAPROXEN 500 MG/1
500 TABLET ORAL 2 TIMES DAILY WITH MEALS
Qty: 30 TABLET | Refills: 0 | Status: SHIPPED | OUTPATIENT
Start: 2020-06-13 | End: 2020-06-15 | Stop reason: SDUPTHER

## 2020-06-13 RX ORDER — LIDOCAINE 50 MG/G
1 PATCH TOPICAL DAILY
Qty: 10 PATCH | Refills: 0 | Status: SHIPPED | OUTPATIENT
Start: 2020-06-13 | End: 2020-06-15 | Stop reason: SDUPTHER

## 2020-06-13 RX ORDER — METHOCARBAMOL 500 MG/1
500 TABLET, FILM COATED ORAL 4 TIMES DAILY
Qty: 40 TABLET | Refills: 0 | Status: SHIPPED | OUTPATIENT
Start: 2020-06-13 | End: 2020-06-15 | Stop reason: SDUPTHER

## 2020-06-13 RX ADMIN — IOPAMIDOL 75 ML: 755 INJECTION, SOLUTION INTRAVENOUS at 13:31

## 2020-06-13 RX ADMIN — ONDANSETRON 4 MG: 2 INJECTION INTRAMUSCULAR; INTRAVENOUS at 12:36

## 2020-06-13 RX ADMIN — HYDROMORPHONE HYDROCHLORIDE 1 MG: 1 INJECTION, SOLUTION INTRAMUSCULAR; INTRAVENOUS; SUBCUTANEOUS at 13:57

## 2020-06-13 RX ADMIN — SODIUM CHLORIDE 1000 ML: 9 INJECTION, SOLUTION INTRAVENOUS at 12:39

## 2020-06-13 RX ADMIN — KETOROLAC TROMETHAMINE 30 MG: 30 INJECTION, SOLUTION INTRAMUSCULAR at 12:36

## 2020-06-13 RX ADMIN — MORPHINE SULFATE 4 MG: 4 INJECTION, SOLUTION INTRAMUSCULAR; INTRAVENOUS at 12:54

## 2020-06-13 RX ADMIN — ORPHENADRINE CITRATE 60 MG: 30 INJECTION INTRAMUSCULAR; INTRAVENOUS at 13:59

## 2020-06-13 ASSESSMENT — PAIN DESCRIPTION - ORIENTATION
ORIENTATION: LEFT
ORIENTATION: LEFT

## 2020-06-13 ASSESSMENT — PAIN - FUNCTIONAL ASSESSMENT
PAIN_FUNCTIONAL_ASSESSMENT: PREVENTS OR INTERFERES WITH MANY ACTIVE NOT PASSIVE ACTIVITIES
PAIN_FUNCTIONAL_ASSESSMENT: PREVENTS OR INTERFERES WITH MANY ACTIVE NOT PASSIVE ACTIVITIES

## 2020-06-13 ASSESSMENT — PAIN SCALES - GENERAL
PAINLEVEL_OUTOF10: 9
PAINLEVEL_OUTOF10: 7
PAINLEVEL_OUTOF10: 7
PAINLEVEL_OUTOF10: 9
PAINLEVEL_OUTOF10: 3
PAINLEVEL_OUTOF10: 7
PAINLEVEL_OUTOF10: 7
PAINLEVEL_OUTOF10: 9
PAINLEVEL_OUTOF10: 3
PAINLEVEL_OUTOF10: 7

## 2020-06-13 ASSESSMENT — PAIN DESCRIPTION - PAIN TYPE
TYPE: ACUTE PAIN
TYPE: ACUTE PAIN

## 2020-06-13 ASSESSMENT — ENCOUNTER SYMPTOMS
ABDOMINAL PAIN: 0
COUGH: 0
VOMITING: 0
COLOR CHANGE: 0
NAUSEA: 1
SHORTNESS OF BREATH: 0
DIARRHEA: 0
WHEEZING: 0
BACK PAIN: 0

## 2020-06-13 ASSESSMENT — PAIN DESCRIPTION - FREQUENCY
FREQUENCY: CONTINUOUS
FREQUENCY: CONTINUOUS

## 2020-06-13 ASSESSMENT — PAIN DESCRIPTION - DESCRIPTORS: DESCRIPTORS: ACHING;THROBBING

## 2020-06-13 ASSESSMENT — PAIN DESCRIPTION - PROGRESSION
CLINICAL_PROGRESSION: GRADUALLY WORSENING
CLINICAL_PROGRESSION: GRADUALLY WORSENING

## 2020-06-13 ASSESSMENT — PAIN DESCRIPTION - LOCATION
LOCATION: BACK
LOCATION: FLANK

## 2020-06-13 NOTE — ED PROVIDER NOTES
additional complaints, no additional aggravating relieving factors. The patient presents awake, alert and in no acute respiratory distress or toxic appearance. PastMedical/Surgical History:  History reviewed. No pertinent past medical history. Procedure Laterality Date    HERNIA REPAIR      KNEE ARTHROSCOPY Left     KNEE ARTHROSCOPY Right        Medications:  Discharge Medication List as of 6/13/2020  3:29 PM      CONTINUE these medications which have NOT CHANGED    Details   Multiple Vitamins-Minerals (MULTIVITAMIN PO) Take 1 tablet by mouth daily. Review of Systems:  Review of Systems   Constitutional: Negative for chills and fever. HENT: Negative for congestion. Respiratory: Negative for cough, shortness of breath and wheezing. Cardiovascular: Negative for chest pain. Gastrointestinal: Positive for nausea. Negative for abdominal pain, diarrhea and vomiting. Genitourinary: Positive for flank pain. Negative for difficulty urinating, dysuria, frequency and hematuria. Patient reports left flank pain that is had for the past couple weeks however has gotten worse over the past 48 hours, has also developed nausea but no vomiting or diarrhea. He states his urine was very dark this morning however denies any additional urinary symptoms. Musculoskeletal: Negative for back pain. Skin: Negative for color change. Neurological: Negative for weakness, numbness and headaches. Positives and Pertinent negatives as per HPI. Except as noted above in the ROS, problem specific ROS was completed and is negative. Physical Exam:  Physical Exam  Vitals signs and nursing note reviewed. Constitutional:       Appearance: He is well-developed. He is not diaphoretic. HENT:      Head: Normocephalic. Right Ear: External ear normal.      Left Ear: External ear normal.   Eyes:      General: No scleral icterus. Right eye: No discharge. Left eye: No discharge.

## 2020-06-15 ENCOUNTER — OFFICE VISIT (OUTPATIENT)
Dept: INTERNAL MEDICINE CLINIC | Age: 60
End: 2020-06-15
Payer: COMMERCIAL

## 2020-06-15 VITALS
SYSTOLIC BLOOD PRESSURE: 118 MMHG | HEART RATE: 61 BPM | OXYGEN SATURATION: 95 % | RESPIRATION RATE: 16 BRPM | DIASTOLIC BLOOD PRESSURE: 78 MMHG | TEMPERATURE: 96.8 F | WEIGHT: 229 LBS | BODY MASS INDEX: 29.4 KG/M2

## 2020-06-15 PROCEDURE — 99214 OFFICE O/P EST MOD 30 MIN: CPT | Performed by: INTERNAL MEDICINE

## 2020-06-15 RX ORDER — NAPROXEN 500 MG/1
500 TABLET ORAL 2 TIMES DAILY WITH MEALS
Qty: 30 TABLET | Refills: 0 | Status: SHIPPED | OUTPATIENT
Start: 2020-06-15 | End: 2020-10-08 | Stop reason: ALTCHOICE

## 2020-06-15 RX ORDER — METHOCARBAMOL 500 MG/1
500 TABLET, FILM COATED ORAL 4 TIMES DAILY
Qty: 40 TABLET | Refills: 0 | Status: SHIPPED | OUTPATIENT
Start: 2020-06-15 | End: 2020-06-25

## 2020-06-15 RX ORDER — LIDOCAINE 50 MG/G
1 PATCH TOPICAL DAILY
Qty: 10 PATCH | Refills: 0 | Status: SHIPPED | OUTPATIENT
Start: 2020-06-15 | End: 2020-06-25

## 2020-06-15 ASSESSMENT — ENCOUNTER SYMPTOMS
VOMITING: 0
ABDOMINAL DISTENTION: 0
DIARRHEA: 0
COUGH: 0
CONSTIPATION: 0
BLOOD IN STOOL: 0
NAUSEA: 0
ABDOMINAL PAIN: 0
WHEEZING: 0
SHORTNESS OF BREATH: 0
BACK PAIN: 1
SORE THROAT: 0
EYE DISCHARGE: 0
CHEST TIGHTNESS: 0
COLOR CHANGE: 0

## 2020-06-15 ASSESSMENT — PATIENT HEALTH QUESTIONNAIRE - PHQ9
SUM OF ALL RESPONSES TO PHQ9 QUESTIONS 1 & 2: 0
1. LITTLE INTEREST OR PLEASURE IN DOING THINGS: 0
SUM OF ALL RESPONSES TO PHQ QUESTIONS 1-9: 0
SUM OF ALL RESPONSES TO PHQ QUESTIONS 1-9: 0
2. FEELING DOWN, DEPRESSED OR HOPELESS: 0

## 2020-06-16 ENCOUNTER — TELEPHONE (OUTPATIENT)
Dept: INTERNAL MEDICINE CLINIC | Age: 60
End: 2020-06-16

## 2020-06-16 ENCOUNTER — HOSPITAL ENCOUNTER (OUTPATIENT)
Dept: MRI IMAGING | Age: 60
Discharge: HOME | End: 2020-06-16
Payer: COMMERCIAL

## 2020-06-16 PROCEDURE — 72157 MRI CHEST SPINE W/O & W/DYE: CPT

## 2020-06-16 PROCEDURE — A9577 INJ MULTIHANCE: HCPCS | Performed by: INTERNAL MEDICINE

## 2020-06-16 PROCEDURE — 6360000004 HC RX CONTRAST MEDICATION: Performed by: INTERNAL MEDICINE

## 2020-06-16 PROCEDURE — 72158 MRI LUMBAR SPINE W/O & W/DYE: CPT

## 2020-06-16 RX ADMIN — GADOBENATE DIMEGLUMINE 20 ML: 529 INJECTION, SOLUTION INTRAVENOUS at 18:06

## 2020-06-16 NOTE — TELEPHONE ENCOUNTER
Reyes Freund from MRI said he does not see any indication for patient to have contrast. Please advise.

## 2020-06-16 NOTE — TELEPHONE ENCOUNTER
I am looking for disc heniation but also for any epidural plegmon although less likely  Please do the test as ordered

## 2020-06-17 NOTE — PROGRESS NOTES
Endocrine referral ordered for eval of thickening of the adrenal glands seen on CT of abdomen. Thank you for seeing my patient .
Musculoskeletal: Normal range of motion. No neck rigidity or muscular tenderness. Cardiovascular:      Rate and Rhythm: Normal rate and regular rhythm. Pulses: Normal pulses. Pulmonary:      Effort: Pulmonary effort is normal.      Breath sounds: Normal breath sounds. No wheezing or rhonchi. Abdominal:      General: Abdomen is flat. There is no distension. Palpations: Abdomen is soft. There is no mass. Tenderness: There is no abdominal tenderness. Musculoskeletal:         General: Tenderness present. Comments: Tenderness on the left side of the flank   Skin:     General: Skin is warm and dry. Neurological:      General: No focal deficit present. Mental Status: He is alert and oriented to person, place, and time. Sensory: No sensory deficit. Motor: No weakness. Comments: Intact sharp and dull sensations on the lateral part of the thigh   Psychiatric:         Mood and Affect: Mood normal.         Behavior: Behavior normal.         Thought Content: Thought content normal.         Judgment: Judgment normal.         ASSESSMENT/PLAN:  1. Acute left-sided low back pain, unspecified whether sciatica present  The pt is reporting numbness of the left lateral hip and thigh. Will continue with the naproxen, lidoderm patch and robaxin as well as Voltaren gel  - MRI [facility]; Future as well as MRI T spine to rule out any disc herniation       Return as scheduled.

## 2020-06-18 ENCOUNTER — TELEPHONE (OUTPATIENT)
Dept: INTERNAL MEDICINE CLINIC | Age: 60
End: 2020-06-18

## 2020-07-01 NOTE — TELEPHONE ENCOUNTER
General 06/17/2020  1:15 PM Dayanna Childress - -   Note    Called pt to schedule from referral . He doesn't  Feel he needs this appt at this time. AS              This was closed due to pt not needed an appt at this time .  Just an [facility]

## 2020-07-02 ENCOUNTER — TELEPHONE (OUTPATIENT)
Dept: INTERNAL MEDICINE CLINIC | Age: 60
End: 2020-07-02

## 2020-08-04 ENCOUNTER — TELEPHONE (OUTPATIENT)
Dept: INTERNAL MEDICINE CLINIC | Age: 60
End: 2020-08-04

## 2020-09-14 ENCOUNTER — PREP FOR PROCEDURE (OUTPATIENT)
Dept: ORTHOPEDIC SURGERY | Age: 60
End: 2020-09-14

## 2020-09-22 ENCOUNTER — OFFICE VISIT (OUTPATIENT)
Dept: INTERNAL MEDICINE CLINIC | Age: 60
End: 2020-09-22
Payer: COMMERCIAL

## 2020-09-22 VITALS
RESPIRATION RATE: 18 BRPM | TEMPERATURE: 97.5 F | HEART RATE: 69 BPM | OXYGEN SATURATION: 96 % | WEIGHT: 226 LBS | DIASTOLIC BLOOD PRESSURE: 72 MMHG | BODY MASS INDEX: 29.02 KG/M2 | SYSTOLIC BLOOD PRESSURE: 128 MMHG

## 2020-09-22 PROCEDURE — 99213 OFFICE O/P EST LOW 20 MIN: CPT | Performed by: NURSE PRACTITIONER

## 2020-09-22 NOTE — PROGRESS NOTES
Maurice Ash  YOB: 1960    @DOS@    Vitals:    09/22/20 1433   BP: 128/72   Site: Right Upper Arm   Position: Sitting   Cuff Size: Medium Adult   Pulse: 69   Resp: 18   Temp: 97.5 °F (36.4 °C)   SpO2: 96%   Weight: 226 lb (102.5 kg)      Wt Readings from Last 2 Encounters:   09/22/20 226 lb (102.5 kg)   06/15/20 229 lb (103.9 kg)     BP Readings from Last 3 Encounters:   09/22/20 128/72   06/15/20 118/78   06/13/20 120/75        Chief Complaint   Patient presents with    Pre-op Exam     right knee replacement, [facility], Dr Suze Plaza, oct 13     No Known Allergies  Current Outpatient Medications   Medication Sig Dispense Refill    naproxen (NAPROSYN) 500 MG tablet Take 1 tablet by mouth 2 times daily (with meals) (Patient not taking: Reported on 9/22/2020) 30 tablet 0    diclofenac sodium (VOLTAREN) 1 % GEL Apply 4 g topically 4 times daily (Patient not taking: Reported on 9/22/2020) 2 Tube 0    Multiple Vitamins-Minerals (MULTIVITAMIN PO) Take 1 tablet by mouth daily. No current facility-administered medications for this visit. This patient presents to the office today for a preoperative consultation at the request of surgeon, Dr Suze Plaza  who plans on performing right knee replacement on October 13 for right knee arthritis. Conservative therapy, including meds, has failed. Planned anesthesia is General.  The patient has the following known anesthesia issues: none . Patient has a bleeding risk of : no recent abnormal bleeding. Patient does not have objection to receiving blood products if needed. There is no problem list on file for this patient. History reviewed. No pertinent past medical history.   Past Surgical History:   Procedure Laterality Date    HERNIA REPAIR      KNEE ARTHROSCOPY Left     KNEE ARTHROSCOPY Right      Family History   Problem Relation Age of Onset    No Known Problems Mother     No Known Problems Father     No Known Problems Sister     No Known Problems Brother     No Known Problems Maternal Aunt     No Known Problems Maternal Uncle     No Known Problems Paternal Aunt     No Known Problems Paternal Uncle     No Known Problems Maternal Grandmother     No Known Problems Maternal Grandfather     No Known Problems Paternal Grandmother     No Known Problems Paternal Grandfather     No Known Problems Other     Anesth Problems Neg Hx     Broken Bones Neg Hx     Cancer Neg Hx     Clotting Disorder Neg Hx     Collagen Disease Neg Hx     Diabetes Neg Hx     Dislocations Neg Hx     Osteoporosis Neg Hx     Rheumatologic Disease Neg Hx     Scoliosis Neg Hx     Severe Sprains Neg Hx      Social History     Socioeconomic History    Marital status:      Spouse name: Not on file    Number of children: Not on file    Years of education: Not on file    Highest education level: Not on file   Occupational History    Occupation: outside    Social Needs    Financial resource strain: Not on file    Food insecurity     Worry: Not on file     Inability: Not on file   Easily[employer] needs     Medical: Not on file     Non-medical: Not on file   Tobacco Use    Smoking status: Current Some Day Smoker     Packs/day: 0.00     Years: 15.00     Pack years: 0.00     Types: Cigars    Smokeless tobacco: Never Used    Tobacco comment: rare   Substance and Sexual Activity    Alcohol use: Yes     Comment:  Occ     Drug use: No    Sexual activity: Yes     Partners: Female   Lifestyle    Physical activity     Days per week: Not on file     Minutes per session: Not on file    Stress: Not on file   Relationships    Social connections     Talks on phone: Not on file     Gets together: Not on file     Attends Yazidism service: Not on file     Active member of club or organization: Not on file     Attends meetings of clubs or organizations: Not on file     Relationship status: Not on file    Intimate partner violence     Fear of current or ex partner: Not on file     Emotionally abused: Not on file     Physically abused: Not on file     Forced sexual activity: Not on file   Other Topics Concern    Not on file   Social History Narrative    Not on file       Review of Systems  Pertinent items are noted in HPI. Physical Exam   Constitutional: Patient is oriented to person, place, and time. He appears well-developed and well-nourished. No distress. HEENT:   Head: Normocephalic and atraumatic. Right Ear: Tympanic membrane, external ear and ear canal normal.   Left Ear: Tympanic membrane, external ear and ear canal normal.   Nose: Nose normal.   Mouth/Throat: Oropharynx is clear and moist, and mucous membranes are normal.  There is no cervical adenopathy. There are no loose teeth. Eyes: Conjunctivae and extraocular motions are normal. Pupils are equal, round, and reactive to light bilaterally. Neck: Neck supple. No JVD present. Carotid bruit is not present. No mass and no thyromegaly present. Cardiovascular: Normal rate, regular rhythm, normal heart sounds and intact distal pulses. Exam reveals no gallop and no friction rub. No murmur heard. Pulmonary/Chest: Effort normal and breath sounds normal. No respiratory distress. There are no wheezes, rhonchi or rales. Abdominal: Soft, non-tender. Normal bowel sounds and aorta. There is no organomegaly, bruit or palpable mass. Neurological: He is alert and oriented to person, place, and time. He has normal reflexes. No cranial nerve deficit. Coordination normal.   Skin: Skin is warm and dry. There is no rash or erythema. No suspicious lesions noted. Psychiatric: He has a normal mood and affect. Speech and behavior are normal. Judgment, cognition and memory are normal.     EKG Interpretation:EKG to be interpret by Dr Jael Acosta to be drawn per DR Patience Whiting orders       Assessment:        61 y.o. patient with planned surgery as above.     Known risk factors for perioperative complications: None    [facility] Risk Stratification for noncardiac surgery:   Class: 1  Risk: low  Cleared for planned surgery pending labs/ekg ( labs and EKG to be reviewed by Dr Fabian Cassidy before surgery)   Current medications which may produce withdrawal symptoms if withheld perioperatively: none     Plan:     1. Preoperative workup as follows:per Dr David Olguin orders  2. Change in medication regimen before surgery: discontinue ASA 14d before surgery, discontinue NSAIDs 14d before surgery. Iza Chandler

## 2020-09-28 ENCOUNTER — TELEPHONE (OUTPATIENT)
Dept: INTERNAL MEDICINE CLINIC | Age: 60
End: 2020-09-28

## 2020-09-28 RX ORDER — AZITHROMYCIN 250 MG/1
250 TABLET, FILM COATED ORAL SEE ADMIN INSTRUCTIONS
Qty: 6 TABLET | Refills: 0 | Status: SHIPPED | OUTPATIENT
Start: 2020-09-28 | End: 2020-10-03

## 2020-10-01 ENCOUNTER — HOSPITAL ENCOUNTER (OUTPATIENT)
Dept: CT IMAGING | Age: 60
Discharge: HOME | End: 2020-10-01
Payer: COMMERCIAL

## 2020-10-01 PROCEDURE — 73700 CT LOWER EXTREMITY W/O DYE: CPT

## 2020-10-05 ENCOUNTER — TELEPHONE (OUTPATIENT)
Dept: ORTHOPEDIC SURGERY | Age: 60
End: 2020-10-05

## 2020-10-05 NOTE — TELEPHONE ENCOUNTER
EMAILED PATIENT COMPLETED APS FOR CROWN PLASTICS ALONG WITH HIS PORTION OF FORM THAT NEEDED COMPLETED TO [email or web address]

## 2020-10-07 ENCOUNTER — TELEPHONE (OUTPATIENT)
Dept: ORTHOPEDIC SURGERY | Age: 60
End: 2020-10-07

## 2020-10-07 ENCOUNTER — HOSPITAL ENCOUNTER (OUTPATIENT)
Age: 60
Discharge: HOME | End: 2020-10-07
Payer: COMMERCIAL

## 2020-10-07 ENCOUNTER — PREP FOR PROCEDURE (OUTPATIENT)
Dept: ORTHOPEDICS UNIT | Age: 60
End: 2020-10-07

## 2020-10-07 ENCOUNTER — OFFICE VISIT (OUTPATIENT)
Dept: PRIMARY CARE CLINIC | Age: 60
End: 2020-10-07
Payer: COMMERCIAL

## 2020-10-07 LAB
ABO/RH: NORMAL
ALBUMIN SERPL-MCNC: 4.3 G/DL (ref 3.4–5)
ANION GAP SERPL CALCULATED.3IONS-SCNC: 11 MMOL/L (ref 3–16)
ANTIBODY SCREEN: NORMAL
APTT: 30.1 SEC (ref 24.2–36.2)
BASOPHILS ABSOLUTE: 0 K/UL (ref 0–0.2)
BASOPHILS RELATIVE PERCENT: 0.7 %
BILIRUBIN URINE: NEGATIVE
BLOOD, URINE: NEGATIVE
BUN BLDV-MCNC: 13 MG/DL (ref 7–20)
CALCIUM SERPL-MCNC: 9.1 MG/DL (ref 8.3–10.6)
CHLORIDE BLD-SCNC: 107 MMOL/L (ref 99–110)
CLARITY: CLEAR
CO2: 24 MMOL/L (ref 21–32)
COLOR: YELLOW
CREAT SERPL-MCNC: 0.9 MG/DL (ref 0.9–1.3)
EKG ATRIAL RATE: 57 BPM
EKG DIAGNOSIS: NORMAL
EKG P AXIS: 57 DEGREES
EKG P-R INTERVAL: 138 MS
EKG Q-T INTERVAL: 424 MS
EKG QRS DURATION: 92 MS
EKG QTC CALCULATION (BAZETT): 412 MS
EKG R AXIS: 43 DEGREES
EKG T AXIS: 27 DEGREES
EKG VENTRICULAR RATE: 57 BPM
EOSINOPHILS ABSOLUTE: 0.1 K/UL (ref 0–0.6)
EOSINOPHILS RELATIVE PERCENT: 1.8 %
ESTIMATED AVERAGE GLUCOSE: 111.2 MG/DL
GFR AFRICAN AMERICAN: >60
GFR NON-AFRICAN AMERICAN: >60
GLUCOSE BLD-MCNC: 95 MG/DL (ref 70–99)
GLUCOSE URINE: NEGATIVE MG/DL
HBA1C MFR BLD: 5.5 %
HCT VFR BLD CALC: 43.9 % (ref 40.5–52.5)
HEMOGLOBIN: 14.9 G/DL (ref 13.5–17.5)
INR BLD: 0.98 (ref 0.86–1.14)
KETONES, URINE: NEGATIVE MG/DL
LEUKOCYTE ESTERASE, URINE: NEGATIVE
LYMPHOCYTES ABSOLUTE: 1.4 K/UL (ref 1–5.1)
LYMPHOCYTES RELATIVE PERCENT: 37.8 %
MCH RBC QN AUTO: 30 PG (ref 26–34)
MCHC RBC AUTO-ENTMCNC: 34 G/DL (ref 31–36)
MCV RBC AUTO: 88.4 FL (ref 80–100)
MICROSCOPIC EXAMINATION: NORMAL
MONOCYTES ABSOLUTE: 0.4 K/UL (ref 0–1.3)
MONOCYTES RELATIVE PERCENT: 11.5 %
NEUTROPHILS ABSOLUTE: 1.8 K/UL (ref 1.7–7.7)
NEUTROPHILS RELATIVE PERCENT: 48.2 %
NITRITE, URINE: NEGATIVE
PDW BLD-RTO: 13.5 % (ref 12.4–15.4)
PH UA: 5 (ref 5–8)
PLATELET # BLD: 212 K/UL (ref 135–450)
PMV BLD AUTO: 9.6 FL (ref 5–10.5)
POTASSIUM SERPL-SCNC: 4.3 MMOL/L (ref 3.5–5.1)
PREALBUMIN: 26.2 MG/DL (ref 20–40)
PROTEIN UA: NEGATIVE MG/DL
PROTHROMBIN TIME: 11.4 SEC (ref 10–13.2)
RBC # BLD: 4.96 M/UL (ref 4.2–5.9)
SEDIMENTATION RATE, ERYTHROCYTE: 4 MM/HR (ref 0–20)
SODIUM BLD-SCNC: 142 MMOL/L (ref 136–145)
SPECIFIC GRAVITY UA: 1.02 (ref 1–1.03)
URINE REFLEX TO CULTURE: NORMAL
URINE TYPE: NORMAL
UROBILINOGEN, URINE: 0.2 E.U./DL
VITAMIN D 25-HYDROXY: 33.1 NG/ML
WBC # BLD: 3.7 K/UL (ref 4–11)

## 2020-10-07 PROCEDURE — 82306 VITAMIN D 25 HYDROXY: CPT

## 2020-10-07 PROCEDURE — 83036 HEMOGLOBIN GLYCOSYLATED A1C: CPT

## 2020-10-07 PROCEDURE — 93005 ELECTROCARDIOGRAM TRACING: CPT

## 2020-10-07 PROCEDURE — 36415 COLL VENOUS BLD VENIPUNCTURE: CPT

## 2020-10-07 PROCEDURE — 85610 PROTHROMBIN TIME: CPT

## 2020-10-07 PROCEDURE — 85652 RBC SED RATE AUTOMATED: CPT

## 2020-10-07 PROCEDURE — 86900 BLOOD TYPING SEROLOGIC ABO: CPT

## 2020-10-07 PROCEDURE — 87081 CULTURE SCREEN ONLY: CPT

## 2020-10-07 PROCEDURE — 80048 BASIC METABOLIC PNL TOTAL CA: CPT

## 2020-10-07 PROCEDURE — 99211 OFF/OP EST MAY X REQ PHY/QHP: CPT | Performed by: NURSE PRACTITIONER

## 2020-10-07 PROCEDURE — 85730 THROMBOPLASTIN TIME PARTIAL: CPT

## 2020-10-07 PROCEDURE — 86850 RBC ANTIBODY SCREEN: CPT

## 2020-10-07 PROCEDURE — 82040 ASSAY OF SERUM ALBUMIN: CPT

## 2020-10-07 PROCEDURE — 85025 COMPLETE CBC W/AUTO DIFF WBC: CPT

## 2020-10-07 PROCEDURE — 81003 URINALYSIS AUTO W/O SCOPE: CPT

## 2020-10-07 PROCEDURE — 84134 ASSAY OF PREALBUMIN: CPT

## 2020-10-07 PROCEDURE — 93010 ELECTROCARDIOGRAM REPORT: CPT | Performed by: INTERNAL MEDICINE

## 2020-10-07 PROCEDURE — 86901 BLOOD TYPING SEROLOGIC RH(D): CPT

## 2020-10-07 RX ORDER — OXYCODONE HYDROCHLORIDE 5 MG/1
10 TABLET ORAL ONCE
Status: CANCELLED | OUTPATIENT
Start: 2020-10-07 | End: 2020-10-07

## 2020-10-07 RX ORDER — CELECOXIB 200 MG/1
200 CAPSULE ORAL ONCE
Status: CANCELLED | OUTPATIENT
Start: 2020-10-07 | End: 2020-10-07

## 2020-10-07 NOTE — PROGRESS NOTES
Patient presented to [facility] drive up clinic for preop testing. Patient was swabbed and given information advising them to remain isolated until procedure date.

## 2020-10-07 NOTE — TELEPHONE ENCOUNTER
CPT: 53612  AUTHORIZATION: approved  BODY PART: right knee  DATE RANGE:  COMMENTS:     Approved as outpatient #[id number] via [employer]

## 2020-10-08 ENCOUNTER — OFFICE VISIT (OUTPATIENT)
Dept: ORTHOPEDIC SURGERY | Age: 60
End: 2020-10-08

## 2020-10-08 LAB
MRSA CULTURE ONLY: NORMAL
SARS-COV-2, NAA: DETECTED

## 2020-10-08 PROCEDURE — 99999 PR OFFICE/OUTPT VISIT,PROCEDURE ONLY: CPT | Performed by: ORTHOPAEDIC SURGERY

## 2020-10-08 NOTE — PROGRESS NOTES
pt states had 2 other COVID test done in september---unable to recall exact dates but the first was negative and the second was positive, pt states had symptoms  was on Z-pac and quarantined for 10 days, 3rd Covid test done on 10/8/20, Dr. Halle Juárez made aware on 10/8/20

## 2020-10-08 NOTE — PROGRESS NOTES
PRE-OP INSTRUCTIONS     · Do not eat or drink anything after 12:00 midnight prior to surgery. · This includes water, chewing gum, mints and ice chips. · You may brush your teeth and gargle the morning of surgery but DO  NOT SWALLOW THE WATER. Take the following medications with a small sip of water on the morning of procedure. Odin Lopez Follow your MD/Surgeons pre-procedure instructions regarding your medications    · You may be asked to stop blood thinners such as:  Coumadin, Plavix, Fragmin and lovenox. Please check with your doctor before stopping these or any other medications. · Aspirin, ibuprofen, advil and naproxen, any anti-inflammatory products should be stopped for a week prior to your surgery. · Do not smoke and do not drink any alcoholic beverages 24 hours prior to your surgery. · Please do not wear any jewelry or body piercings on the day of surgery. · Please wear something simple, loose fitting clothing to the hospital.  Do not wear any make-up(including eye make-up) or nail polish on your fingers and toes. As part of our patient safety program to minimize surgical infections, we ask you to do the following:    Please notify your surgeon if you develop any illness between now and the day of your surgery. This includes a cough, cold, fever, sore  throat, nausea, vomiting, diarrhea, etc.   Please notify your surgeon if you experience dizziness, shortness of  breath or blurred vision between now and the time of your surgery. Please notify your surgeon of any open or redden areas that may look infected       DO NOT shave your operative site 96 hours(four days) prior to surgery. Shower the week before surgery with an antibacterial soap such as:dial,safeguard, etc.       Three(3) days prior to your surgery, cleanse the operative site with Hibiclens(anti-microbial soap).  This soap may dry your skin, please do not apply any oils or lotions     · Please bring your insurance card and picture ID day of surgery    · If you have a living will or durable power of attorny. Please bring in a copy of you advanced directives. · If you have dentures, they will be removed before going to the OR, we will provide you with a container. If you wear contact lenses/glasses, they will be removes, please bring a case    · Have you seen your family doctor for a pre-op history and physical.      · Surgery scheduler will call you 48 hours prior to your surgery to notify you of the time of your surgery and the time you will need to be at hospital...patients are asked to arrive 2 1/2 hours prior to surgery. ·  Please call Pre-Admission testing if you have any further questions.                   [id number] [facility] testing phone number:  [phone]      Thank You for choosing [facility]!!

## 2020-10-08 NOTE — PROGRESS NOTES
C-Difficile admission screening and protocol:     * Admitted with diarrhea? YES____    NO___X__     *Prior history of C-Diff. In last 3 months? YES____   NO__X__     *Antibiotic use in the past 6-8 weeks? NO______YES__X____                 If yes which  ANTIBIOTIC AND REASON___X--symptons of covid___     *Prior hospitalization or nursing home in the last month?  YES____   NO_X___

## 2020-10-08 NOTE — PROGRESS NOTES
Preoperative Screening for Elective Surgery/Invasive Procedures While COVID-19 present in the community    · pt states had 2 other COVID test done in september---unable to recall exact dates but the first was negative and the second was positive, pt states had symptoms  was on Z-pac and quarantined for 10 days, 3rd Covid test done on 10/8/20, Dr. Mansoor Gao aware     Have you tested positive or have been told to self-isolate for COVID-19 like symptoms within the past 28 days? Yes   Do you currently have any of the following symptoms? NO  o Fever >100.0 F or 99.9 F in immunocompromised patients? o New onset cough, shortness of breath or difficulty breathing?  o New onset sore throat, myalgia (muscle aches and pains), headache, loss of taste/smell or diarrhea?  Have you had a potential exposure to COVID-19 within the past 14 days by:  YES  o Close contact with a confirmed case? o Close contact with a healthcare worker,  or essential infrastructure worker (grocery store, [employer], gas station, public utilities or transportation)? o Do you reside in a congregate setting such as; skilled nursing facility, adult home, correctional facility, homeless shelter or other institutional setting?  o Have you had recent travel to a known COVID-19 hotspot? Indicate if the patient has a positive screen by answering yes to one or more of the above questions. Patients who test positive or screen positive prior to surgery or on the day of surgery should be evaluated in conjunction with the surgeon/proceduralist/anesthesiologist to determine the urgency of the procedure.

## 2020-10-09 ENCOUNTER — OFFICE VISIT (OUTPATIENT)
Dept: PRIMARY CARE CLINIC | Age: 60
End: 2020-10-09
Payer: COMMERCIAL

## 2020-10-09 PROCEDURE — 99211 OFF/OP EST MAY X REQ PHY/QHP: CPT | Performed by: NURSE PRACTITIONER

## 2020-10-10 LAB — SARS-COV-2, NAA: DETECTED

## 2020-10-16 ENCOUNTER — TELEPHONE (OUTPATIENT)
Dept: ORTHOPEDIC SURGERY | Age: 60
End: 2020-10-16

## 2020-10-16 NOTE — TELEPHONE ENCOUNTER
CPT: 81387  AUTHORIZATION: approved  BODY PART: left knee    Approved [employer] #[id number] outpatient 10/16/20

## 2020-10-16 NOTE — TELEPHONE ENCOUNTER
CPT: 18253  AUTHORIZATION: approved  BODY PART: right knee    Approved #[id number] outpatient 10/16/20

## 2020-10-21 ENCOUNTER — PREP FOR PROCEDURE (OUTPATIENT)
Dept: ORTHOPEDICS UNIT | Age: 60
End: 2020-10-21

## 2020-10-22 RX ORDER — OXYCODONE HYDROCHLORIDE 10 MG/1
10 TABLET ORAL ONCE
Status: CANCELLED | OUTPATIENT
Start: 2020-10-22 | End: 2020-10-22

## 2020-10-22 RX ORDER — CELECOXIB 200 MG/1
200 CAPSULE ORAL ONCE
Status: CANCELLED | OUTPATIENT
Start: 2020-10-22 | End: 2020-10-22

## 2020-11-02 ENCOUNTER — TELEPHONE (OUTPATIENT)
Dept: INTERNAL MEDICINE CLINIC | Age: 60
End: 2020-11-02

## 2020-11-02 NOTE — DISCHARGE INSTR - COC
[facility] ([facility]) Continuity of Care Form    Patient Name:  Channing Dowell  : 1960    MRN:  [id number]    Admit date:  (Not on file)  Discharge date:  2020    Code Status Order: No Order  Advance Directives: Yes    Admitting Physician: Calos Stephens MD  PCP: Christian Conrad, APRN - CNP    Discharging Nurse: [facility] Unit/Room#: No information available for this encounter. Discharging Unit Phone Number: [phone]    Emergency Contact:        Past Surgical History:  Past Surgical History:   Procedure Laterality Date    HERNIA REPAIR Right     inguinal hernia X 2    KNEE ARTHROSCOPY Left     KNEE ARTHROSCOPY Right        Immunization History:   Immunization History   Administered Date(s) Administered    Tdap (Boostrix, Adacel) 2017       Active Problems:  Active Problems:    * No active hospital problems. *  Resolved Problems:    * No resolved hospital problems. *      Isolation/Infection:       Nurse Assessment:  Last Vital Signs:Ht 6' 2\" (1.88 m)   Wt 225 lb (102.1 kg)   BMI 28.89 kg/m²   Last documented pain score (0-10 scale):    Last Weight:   Wt Readings from Last 1 Encounters:   20 226 lb (102.5 kg)     Mental Status:  oriented and alert     IV Access:  - None    Nursing Mobility/ADLs:  Walking   Assisted  Transfer  Assisted  Bathing  Assisted  Dressing  Assisted  Toileting  Assisted  Feeding  Independent  Med Admin  Independent  Med Delivery   whole    Wound Care Documentation and Therapy:  Keep glued Prineo dressing clean and intact. DO NOT remove. Prineo is waterproof for showering. Doctor will evaluated dressing for removal at office visit 2 weeks after surgery        Elimination:  Urinary Catheter: None   Colostomy/Ileostomy: No  Continence:   · Bowel: Yes  · Bladder: Yes  Date of Last BM: 2020    Intake/Output Summary (Last 24 hours)   No intake or output data in the 24 hours ending 20 1036  Safety Concerns:      At Risk for Milagro informed. I will call josephine and cancel her refill for the bid and send in new rx for tid.   Falls    Impairments/Disabilities:      Vision    Nutrition Therapy:  Current Nutrition Therapy: No diet orders on file  Routes of Feeding: Oral  Liquids: No Restrictions  Daily Fluid Restriction: no  Last Modified Barium Swallow with Video (Video Swallowing Test): not done    Treatments at the Time of Hospital Discharge:   Respiratory Treatments:   Oxygen Therapy:  is not on home oxygen therapy. Ventilator:    - No ventilator support    Lab orders for discharge:    Rehab Therapies: Physical Therapy, Occupational Therapy  Weight Bearing Status/Restrictions: No weight bearing restirctions  Other Medical Equipment (for information only, NOT a DME order):  Rolling walker  Other Treatments: ASA 81mg twice at day for 14 days for DVT prophylaxis ,  bilateral SAL hose for 2 weeks after surgery    Patient's personal belongings (please select all that are sent with patient):  Candace Gipson RN SIGNATURE:  Electronically signed by Bradford Buckley RN on 11/4/20 at 3:18 PM EST    PHYSICIAN SECTION    Prognosis: Good    Condition at Discharge: Stable    Rehab Potential (if transferring to Rehab): Good    Physician Certification: I certify the above orders, information, and transfer of Octavia Abdul is necessary for the continuing treatment of the diagnosis listed and that he requires [address] for less 30 days.      Update Admission H&P: No change in H&P    PHYSICIAN SIGNATURE:  Electronically signed by LISHA Gregory CNP on 11/2/20 at 10:36 AM RK/Ángel    CASE MANAGEMENT/SOCIAL WORK SECTION    Inpatient Status Date: ***    [facility] Readmission Risk Assessment Score:    Discharging to Facility/ Agency   · Name:  [facility] care    · Address: [address] [place] [address], [address] [place] [address]., [place] [address]  · Phone: [phone]  · Fax: [phone]      / signature: {Esignature:[id number]:::0}      Followup with Dr Malika Ariza 2 weeks after surgery in office   [phone]  [facility], [address] [facility], [address] [facility],   [address] [place] [address] TOTAL KNEE REPLACEMENT  Activity:   Elevate your leg if swelling occurs in your ankle. Use elastic wraps/hose until swelling decreases.  Continue the exercise program as prescribed by physical therapists.  Take frequent walks.  Use walker, crutches, or cane with weight bearing instructions as indicated by the physical therapists.  Take rest periods often. Elevate leg during rest period. Wound Care:   Cover the wound with a sterile gauze dressing and change daily as long as there is drainage.  Do not scrub wound. Pat it dry with a soft towel.  Dont apply any lotions or creams to your wound.  Check the incision every day for redness, swelling, or increase in drainage. Diet:   You can resume your normal diet. There are no limits on your diet due to your surgery.  Pain pills and activity changes may lead to constipation. To prevent this, use prune juice or bran cereals liberally. You may need to use a laxative such as Dulcolax, Senokot, or Milk of Magnesia.  Drink plenty of fluids. Medications:   Take pain pills if needed to maintain comfort.  Never drive while taking pain medicine.  Avoid over the counter medications until checking with your doctor.  Resume previous medications as instructed by your doctor. You will be on aspirin or Eliquis  for 14 days only. Stay off other anti-inflammatory medications (except Celebrex)  Call Your Doctor If:  Floydene Ada You have increased pain not controlled by medications.  Excessive swelling in your ankle.  You develop numbness, tingling, or decreased movement.  You have a fever greater than 100 degrees for a day or over 101 degrees at any one time.  Your wound becomes more reddened, starts draining, or opens.  If you fall. You have any questions about your recovery.   Inform your family doctor/dentist or any other doctor who cares for you in the future that you have a joint replacement. You may need antibiotics for dental or surgical procedures if there is any evidence of infection present. ? If you have required the use of insulin to control your blood sugar after surgery, follow up with your family doctor. ? Call your surgeons office to schedule your appointment to be seen after surgery. ? Make your appointment to continue physical therapy per doctors orders. ?  Smoking cessation assistance can be obtained from your family doctor or by calling [place] @ [phone]    _______________________________   _____   _______________________  ____                Patient Signature              Date      Witness                               Date

## 2020-11-02 NOTE — TELEPHONE ENCOUNTER
Pt had a preop on 9-22-20. He tested positive for Covid so they had to r/s him for surgery 28 days later. PLease update preop and fax to [phone].

## 2020-11-02 NOTE — PROGRESS NOTES
C-Difficile admission screening and protocol:     * Admitted with diarrhea? n     *Prior history of C-Diff. In last 3 months? n     *Antibiotic use in the past 6-8 weeks? n     *Prior hospitalization or nursing home in the last month? n      [address] time_____630_______        Surgery time____________    Take the following medications with a sip of water:    Do not eat or drink anything after 12:00 midnight prior to your surgery. This includes water chewing gum, mints and ice chips. You may brush your teeth and gargle the morning of your surgery, but do not swallow the water     Please see your family doctor/pediatrician for a history and physical and/or concerning medications. Bring any test results/reports from your physicians office. If you are under the care of a heart doctor or specialist doctor, please be aware that you may be asked to them for clearance    You may be asked to stop blood thinners such as Coumadin, Plavix, Fragmin, Lovenox, etc., or any anti-inflammatories such as:  Aspirin, Ibuprofen, Advil, Naproxen prior to your surgery. We also ask that you stop any OTC medications such as fish oil, vitamin E, glucosamine, garlic, Multivitamins, COQ 10, etc.    We ask that you do not smoke 24 hours prior to surgery  We ask that you do not  drink any alcoholic beverages 24 hours prior to surgery     You must make arrangements for a responsible adult to take you home after your surgery. For your safety you will not be allowed to leave alone or drive yourself home. Your surgery will be cancelled if you do not have a ride home. Also for your safety, it is strongly suggested that someone stay with you the first 24 hours after your surgery. A parent or legal guardian must accompany a child scheduled for surgery and plan to stay at the hospital until the child is discharged. Please do not bring other children with you.     For your comfort, please wear simple loose fitting clothing to the hospital.  Please do not bring valuables. Do not wear any make-up or nail polish on your fingers or toes      For your safety, please do not wear any jewelry or body piercing's on the day of surgery. All jewelry must be removed. If you have dentures, they will be removed before going to operating room. For your convenience, we will provide you with a container. If you wear contact lenses or glasses, they will be removed, please bring a case for them. If you have a living will and a durable power of  for healthcare, please bring in a copy. As part of our patient safety program to minimize surgical site infections, we ask you to do the following:    · Please notify your surgeon if you develop any illness between         now and the  day of your surgery. · This includes a cough, cold, fever, sore throat, nausea,         or vomiting, and diarrhea, etc.  ·  Please notify your surgeon if you experience dizziness, shortness         of breath or blurred vision between now and the time of your surgery. Do not shave your operative site 96 hours prior to surgery. For face and neck surgery, men may use an electric razor 48 hours   prior to surgery. You may shower the night before surgery or the morning of   your surgery with an antibacterial soap. You will need to bring a photo ID and insurance card    [facility] has an onsite pharmacy, would you like to utilize our pharmacy     If you will be staying overnight and use a C-pap machine, please bring   your C-pap to hospital     Our goal is to provide you with excellent care, therefore, visitors will be limited to two(2) in the room at a time so that we may focus on providing this care for you. Please contact pre-admission testing if you have any further questions.                  [facility] phone number:  [phone] PAT fax number:  [phone]  Please note these are generalized instructions for all surgical cases, you may be provided with more specific instructions according to your surgery.

## 2020-11-03 ENCOUNTER — ANESTHESIA EVENT (OUTPATIENT)
Dept: OPERATING ROOM | Age: 60
End: 2020-11-03
Payer: COMMERCIAL

## 2020-11-03 NOTE — PROGRESS NOTES
skype message left with Valentino Stephens that no addendum is in epic for h/p from 9/22 clearing them for surgery on 11/4-    Chart turned in

## 2020-11-04 ENCOUNTER — ANESTHESIA (OUTPATIENT)
Dept: OPERATING ROOM | Age: 60
End: 2020-11-04
Payer: COMMERCIAL

## 2020-11-04 ENCOUNTER — APPOINTMENT (OUTPATIENT)
Dept: GENERAL RADIOLOGY | Age: 60
End: 2020-11-04
Attending: ORTHOPAEDIC SURGERY
Payer: COMMERCIAL

## 2020-11-04 ENCOUNTER — HOSPITAL ENCOUNTER (OUTPATIENT)
Age: 60
Setting detail: SURGERY ADMIT
Discharge: HOME | End: 2020-11-04
Attending: ORTHOPAEDIC SURGERY | Admitting: ORTHOPAEDIC SURGERY
Payer: COMMERCIAL

## 2020-11-04 VITALS
TEMPERATURE: 96.9 F | SYSTOLIC BLOOD PRESSURE: 140 MMHG | DIASTOLIC BLOOD PRESSURE: 83 MMHG | WEIGHT: 221.23 LBS | RESPIRATION RATE: 18 BRPM | HEIGHT: 74 IN | HEART RATE: 59 BPM | OXYGEN SATURATION: 98 % | BODY MASS INDEX: 28.39 KG/M2

## 2020-11-04 VITALS
RESPIRATION RATE: 10 BRPM | DIASTOLIC BLOOD PRESSURE: 55 MMHG | SYSTOLIC BLOOD PRESSURE: 103 MMHG | OXYGEN SATURATION: 95 % | TEMPERATURE: 96.8 F

## 2020-11-04 LAB
ABO/RH: NORMAL
ANTIBODY SCREEN: NORMAL
GLUCOSE BLD-MCNC: 91 MG/DL (ref 70–99)
PERFORMED ON: NORMAL
SARS-COV-2, NAAT: DETECTED

## 2020-11-04 PROCEDURE — C9290 INJ, BUPIVACAINE LIPOSOME: HCPCS | Performed by: ORTHOPAEDIC SURGERY

## 2020-11-04 PROCEDURE — 2580000003 HC RX 258: Performed by: ORTHOPAEDIC SURGERY

## 2020-11-04 PROCEDURE — U0002 COVID-19 LAB TEST NON-CDC: HCPCS

## 2020-11-04 PROCEDURE — 3700000000 HC ANESTHESIA ATTENDED CARE: Performed by: ORTHOPAEDIC SURGERY

## 2020-11-04 PROCEDURE — 6370000000 HC RX 637 (ALT 250 FOR IP): Performed by: NURSE PRACTITIONER

## 2020-11-04 PROCEDURE — 73560 X-RAY EXAM OF KNEE 1 OR 2: CPT

## 2020-11-04 PROCEDURE — 7100000010 HC PHASE II RECOVERY - FIRST 15 MIN: Performed by: ORTHOPAEDIC SURGERY

## 2020-11-04 PROCEDURE — 2500000003 HC RX 250 WO HCPCS: Performed by: NURSE PRACTITIONER

## 2020-11-04 PROCEDURE — 86901 BLOOD TYPING SEROLOGIC RH(D): CPT

## 2020-11-04 PROCEDURE — 3600000005 HC SURGERY LEVEL 5 BASE: Performed by: ORTHOPAEDIC SURGERY

## 2020-11-04 PROCEDURE — 2709999900 HC NON-CHARGEABLE SUPPLY: Performed by: ORTHOPAEDIC SURGERY

## 2020-11-04 PROCEDURE — 7100000011 HC PHASE II RECOVERY - ADDTL 15 MIN: Performed by: ORTHOPAEDIC SURGERY

## 2020-11-04 PROCEDURE — 6360000002 HC RX W HCPCS: Performed by: NURSE ANESTHETIST, CERTIFIED REGISTERED

## 2020-11-04 PROCEDURE — 6370000000 HC RX 637 (ALT 250 FOR IP): Performed by: ORTHOPAEDIC SURGERY

## 2020-11-04 PROCEDURE — 88311 DECALCIFY TISSUE: CPT

## 2020-11-04 PROCEDURE — 2580000003 HC RX 258: Performed by: NURSE PRACTITIONER

## 2020-11-04 PROCEDURE — 6370000000 HC RX 637 (ALT 250 FOR IP): Performed by: ANESTHESIOLOGY

## 2020-11-04 PROCEDURE — 88305 TISSUE EXAM BY PATHOLOGIST: CPT

## 2020-11-04 PROCEDURE — 6360000002 HC RX W HCPCS: Performed by: NURSE PRACTITIONER

## 2020-11-04 PROCEDURE — 86900 BLOOD TYPING SEROLOGIC ABO: CPT

## 2020-11-04 PROCEDURE — 2500000003 HC RX 250 WO HCPCS: Performed by: NURSE ANESTHETIST, CERTIFIED REGISTERED

## 2020-11-04 PROCEDURE — 7100000001 HC PACU RECOVERY - ADDTL 15 MIN: Performed by: ORTHOPAEDIC SURGERY

## 2020-11-04 PROCEDURE — 6360000002 HC RX W HCPCS: Performed by: ORTHOPAEDIC SURGERY

## 2020-11-04 PROCEDURE — 3700000001 HC ADD 15 MINUTES (ANESTHESIA): Performed by: ORTHOPAEDIC SURGERY

## 2020-11-04 PROCEDURE — 7100000000 HC PACU RECOVERY - FIRST 15 MIN: Performed by: ORTHOPAEDIC SURGERY

## 2020-11-04 PROCEDURE — 2500000003 HC RX 250 WO HCPCS: Performed by: ORTHOPAEDIC SURGERY

## 2020-11-04 PROCEDURE — 86850 RBC ANTIBODY SCREEN: CPT

## 2020-11-04 PROCEDURE — 2580000003 HC RX 258: Performed by: ANESTHESIOLOGY

## 2020-11-04 PROCEDURE — 2720000010 HC SURG SUPPLY STERILE: Performed by: ORTHOPAEDIC SURGERY

## 2020-11-04 PROCEDURE — 3600000015 HC SURGERY LEVEL 5 ADDTL 15MIN: Performed by: ORTHOPAEDIC SURGERY

## 2020-11-04 PROCEDURE — C1776 JOINT DEVICE (IMPLANTABLE): HCPCS | Performed by: ORTHOPAEDIC SURGERY

## 2020-11-04 DEVICE — INSERT TIB CS 7 10 MM ARTC POST KNEE BEAR TECHNOLOGY X3: Type: IMPLANTABLE DEVICE | Site: KNEE | Status: FUNCTIONAL

## 2020-11-04 DEVICE — IMPLANTABLE DEVICE: Type: IMPLANTABLE DEVICE | Site: KNEE | Status: FUNCTIONAL

## 2020-11-04 DEVICE — BASEPLATE TIB SZ 7 AP56MM ML80MM KNEE TRITANIUM 4 CRUCFRM: Type: IMPLANTABLE DEVICE | Site: KNEE | Status: FUNCTIONAL

## 2020-11-04 RX ORDER — DEXAMETHASONE SODIUM PHOSPHATE 4 MG/ML
INJECTION, SOLUTION INTRA-ARTICULAR; INTRALESIONAL; INTRAMUSCULAR; INTRAVENOUS; SOFT TISSUE PRN
Status: DISCONTINUED | OUTPATIENT
Start: 2020-11-04 | End: 2020-11-04 | Stop reason: SDUPTHER

## 2020-11-04 RX ORDER — FENTANYL CITRATE 50 UG/ML
INJECTION, SOLUTION INTRAMUSCULAR; INTRAVENOUS PRN
Status: DISCONTINUED | OUTPATIENT
Start: 2020-11-04 | End: 2020-11-04 | Stop reason: SDUPTHER

## 2020-11-04 RX ORDER — SODIUM CHLORIDE 0.9 % (FLUSH) 0.9 %
10 SYRINGE (ML) INJECTION EVERY 12 HOURS SCHEDULED
Status: DISCONTINUED | OUTPATIENT
Start: 2020-11-04 | End: 2020-11-04 | Stop reason: HOSPADM

## 2020-11-04 RX ORDER — PROPOFOL 10 MG/ML
INJECTION, EMULSION INTRAVENOUS CONTINUOUS PRN
Status: DISCONTINUED | OUTPATIENT
Start: 2020-11-04 | End: 2020-11-04 | Stop reason: SDUPTHER

## 2020-11-04 RX ORDER — CELECOXIB 200 MG/1
200 CAPSULE ORAL ONCE
Status: COMPLETED | OUTPATIENT
Start: 2020-11-04 | End: 2020-11-04

## 2020-11-04 RX ORDER — ASPIRIN 81 MG/1
81 TABLET ORAL 2 TIMES DAILY
Qty: 28 TABLET | Refills: 0 | Status: SHIPPED | OUTPATIENT
Start: 2020-11-04 | End: 2020-11-25

## 2020-11-04 RX ORDER — MORPHINE SULFATE 2 MG/ML
2 INJECTION, SOLUTION INTRAMUSCULAR; INTRAVENOUS EVERY 5 MIN PRN
Status: DISCONTINUED | OUTPATIENT
Start: 2020-11-04 | End: 2020-11-04 | Stop reason: HOSPADM

## 2020-11-04 RX ORDER — LABETALOL HYDROCHLORIDE 5 MG/ML
5 INJECTION, SOLUTION INTRAVENOUS EVERY 10 MIN PRN
Status: DISCONTINUED | OUTPATIENT
Start: 2020-11-04 | End: 2020-11-04 | Stop reason: HOSPADM

## 2020-11-04 RX ORDER — OXYCODONE HYDROCHLORIDE 10 MG/1
10 TABLET ORAL ONCE
Status: DISCONTINUED | OUTPATIENT
Start: 2020-11-04 | End: 2020-11-04 | Stop reason: SDUPTHER

## 2020-11-04 RX ORDER — OXYCODONE HYDROCHLORIDE 5 MG/1
5 TABLET ORAL
Status: DISCONTINUED | OUTPATIENT
Start: 2020-11-04 | End: 2020-11-04 | Stop reason: HOSPADM

## 2020-11-04 RX ORDER — ONDANSETRON 2 MG/ML
4 INJECTION INTRAMUSCULAR; INTRAVENOUS
Status: DISCONTINUED | OUTPATIENT
Start: 2020-11-04 | End: 2020-11-04 | Stop reason: HOSPADM

## 2020-11-04 RX ORDER — BUPIVACAINE HYDROCHLORIDE 7.5 MG/ML
INJECTION, SOLUTION INTRASPINAL PRN
Status: DISCONTINUED | OUTPATIENT
Start: 2020-11-04 | End: 2020-11-04 | Stop reason: SDUPTHER

## 2020-11-04 RX ORDER — SODIUM CHLORIDE 0.9 % (FLUSH) 0.9 %
10 SYRINGE (ML) INJECTION PRN
Status: DISCONTINUED | OUTPATIENT
Start: 2020-11-04 | End: 2020-11-04 | Stop reason: HOSPADM

## 2020-11-04 RX ORDER — ONDANSETRON 2 MG/ML
INJECTION INTRAMUSCULAR; INTRAVENOUS PRN
Status: DISCONTINUED | OUTPATIENT
Start: 2020-11-04 | End: 2020-11-04 | Stop reason: SDUPTHER

## 2020-11-04 RX ORDER — GLYCOPYRROLATE 0.2 MG/ML
INJECTION INTRAMUSCULAR; INTRAVENOUS PRN
Status: DISCONTINUED | OUTPATIENT
Start: 2020-11-04 | End: 2020-11-04 | Stop reason: SDUPTHER

## 2020-11-04 RX ORDER — SODIUM CHLORIDE 9 MG/ML
INJECTION, SOLUTION INTRAVENOUS CONTINUOUS
Status: DISCONTINUED | OUTPATIENT
Start: 2020-11-04 | End: 2020-11-04 | Stop reason: HOSPADM

## 2020-11-04 RX ORDER — OXYCODONE HYDROCHLORIDE AND ACETAMINOPHEN 5; 325 MG/1; MG/1
1 TABLET ORAL PRN
Status: DISCONTINUED | OUTPATIENT
Start: 2020-11-04 | End: 2020-11-04 | Stop reason: HOSPADM

## 2020-11-04 RX ORDER — CEPHALEXIN 500 MG/1
500 CAPSULE ORAL SEE ADMIN INSTRUCTIONS
Qty: 2 CAPSULE | Refills: 0 | Status: ON HOLD | OUTPATIENT
Start: 2020-11-04 | End: 2020-12-01 | Stop reason: HOSPADM

## 2020-11-04 RX ORDER — OXYCODONE HYDROCHLORIDE 10 MG/1
10 TABLET ORAL
Status: COMPLETED | OUTPATIENT
Start: 2020-11-04 | End: 2020-11-04

## 2020-11-04 RX ORDER — OXYCODONE HYDROCHLORIDE 5 MG/1
5-10 TABLET ORAL EVERY 6 HOURS PRN
Qty: 40 TABLET | Refills: 0 | Status: SHIPPED | OUTPATIENT
Start: 2020-11-04 | End: 2020-11-09

## 2020-11-04 RX ORDER — HYDRALAZINE HYDROCHLORIDE 20 MG/ML
5 INJECTION INTRAMUSCULAR; INTRAVENOUS
Status: DISCONTINUED | OUTPATIENT
Start: 2020-11-04 | End: 2020-11-04 | Stop reason: HOSPADM

## 2020-11-04 RX ORDER — TRANEXAMIC ACID 100 MG/ML
INJECTION, SOLUTION INTRAVENOUS
Status: COMPLETED | OUTPATIENT
Start: 2020-11-04 | End: 2020-11-04

## 2020-11-04 RX ORDER — CELECOXIB 200 MG/1
200 CAPSULE ORAL ONCE
Status: DISCONTINUED | OUTPATIENT
Start: 2020-11-04 | End: 2020-11-04 | Stop reason: SDUPTHER

## 2020-11-04 RX ORDER — MEPERIDINE HYDROCHLORIDE 25 MG/ML
12.5 INJECTION INTRAMUSCULAR; INTRAVENOUS; SUBCUTANEOUS EVERY 5 MIN PRN
Status: DISCONTINUED | OUTPATIENT
Start: 2020-11-04 | End: 2020-11-04 | Stop reason: HOSPADM

## 2020-11-04 RX ORDER — LIDOCAINE HYDROCHLORIDE 10 MG/ML
INJECTION, SOLUTION EPIDURAL; INFILTRATION; INTRACAUDAL; PERINEURAL PRN
Status: DISCONTINUED | OUTPATIENT
Start: 2020-11-04 | End: 2020-11-04 | Stop reason: SDUPTHER

## 2020-11-04 RX ORDER — MORPHINE SULFATE 2 MG/ML
1 INJECTION, SOLUTION INTRAMUSCULAR; INTRAVENOUS EVERY 5 MIN PRN
Status: DISCONTINUED | OUTPATIENT
Start: 2020-11-04 | End: 2020-11-04 | Stop reason: HOSPADM

## 2020-11-04 RX ORDER — MIDAZOLAM HYDROCHLORIDE 1 MG/ML
INJECTION INTRAMUSCULAR; INTRAVENOUS PRN
Status: DISCONTINUED | OUTPATIENT
Start: 2020-11-04 | End: 2020-11-04 | Stop reason: SDUPTHER

## 2020-11-04 RX ORDER — LIDOCAINE HYDROCHLORIDE 20 MG/ML
INJECTION, SOLUTION EPIDURAL; INFILTRATION; INTRACAUDAL; PERINEURAL PRN
Status: DISCONTINUED | OUTPATIENT
Start: 2020-11-04 | End: 2020-11-04 | Stop reason: SDUPTHER

## 2020-11-04 RX ORDER — ACETAMINOPHEN 500 MG
1000 TABLET ORAL ONCE
Status: COMPLETED | OUTPATIENT
Start: 2020-11-04 | End: 2020-11-04

## 2020-11-04 RX ORDER — OXYCODONE HYDROCHLORIDE 10 MG/1
10 TABLET ORAL ONCE
Status: COMPLETED | OUTPATIENT
Start: 2020-11-04 | End: 2020-11-04

## 2020-11-04 RX ORDER — VANCOMYCIN HYDROCHLORIDE 1 G/20ML
INJECTION, POWDER, LYOPHILIZED, FOR SOLUTION INTRAVENOUS
Status: COMPLETED | OUTPATIENT
Start: 2020-11-04 | End: 2020-11-04

## 2020-11-04 RX ORDER — OXYCODONE HYDROCHLORIDE AND ACETAMINOPHEN 5; 325 MG/1; MG/1
2 TABLET ORAL PRN
Status: DISCONTINUED | OUTPATIENT
Start: 2020-11-04 | End: 2020-11-04 | Stop reason: HOSPADM

## 2020-11-04 RX ADMIN — CEFAZOLIN SODIUM 2 G: 10 INJECTION, POWDER, FOR SOLUTION INTRAVENOUS at 15:51

## 2020-11-04 RX ADMIN — LIDOCAINE HYDROCHLORIDE 60 MG: 20 INJECTION, SOLUTION EPIDURAL; INFILTRATION; INTRACAUDAL; PERINEURAL at 11:57

## 2020-11-04 RX ADMIN — SODIUM CHLORIDE: 9 INJECTION, SOLUTION INTRAVENOUS at 08:55

## 2020-11-04 RX ADMIN — ONDANSETRON 4 MG: 2 INJECTION INTRAMUSCULAR; INTRAVENOUS at 11:58

## 2020-11-04 RX ADMIN — LIDOCAINE HYDROCHLORIDE 30 MG: 10 INJECTION, SOLUTION EPIDURAL; INFILTRATION; INTRACAUDAL; PERINEURAL at 11:56

## 2020-11-04 RX ADMIN — MIDAZOLAM 2 MG: 1 INJECTION INTRAMUSCULAR; INTRAVENOUS at 11:48

## 2020-11-04 RX ADMIN — DEXAMETHASONE SODIUM PHOSPHATE 8 MG: 4 INJECTION, SOLUTION INTRAMUSCULAR; INTRAVENOUS at 11:58

## 2020-11-04 RX ADMIN — ACETAMINOPHEN 1000 MG: 500 TABLET ORAL at 15:51

## 2020-11-04 RX ADMIN — CELECOXIB 200 MG: 200 CAPSULE ORAL at 08:55

## 2020-11-04 RX ADMIN — SODIUM CHLORIDE 1000 MG: 900 INJECTION INTRAVENOUS at 16:26

## 2020-11-04 RX ADMIN — OXYCODONE HYDROCHLORIDE 10 MG: 10 TABLET ORAL at 08:55

## 2020-11-04 RX ADMIN — PROPOFOL 120 MCG/KG/MIN: 10 INJECTION, EMULSION INTRAVENOUS at 11:57

## 2020-11-04 RX ADMIN — BUPIVACAINE HYDROCHLORIDE IN DEXTROSE 2 ML: 7.5 INJECTION, SOLUTION SUBARACHNOID at 11:56

## 2020-11-04 RX ADMIN — CEFAZOLIN SODIUM 2 G: 10 INJECTION, POWDER, FOR SOLUTION INTRAVENOUS at 11:48

## 2020-11-04 RX ADMIN — OXYCODONE HYDROCHLORIDE 10 MG: 10 TABLET ORAL at 16:16

## 2020-11-04 RX ADMIN — SODIUM CHLORIDE: 9 INJECTION, SOLUTION INTRAVENOUS at 13:02

## 2020-11-04 RX ADMIN — FENTANYL CITRATE 100 MCG: 50 INJECTION INTRAMUSCULAR; INTRAVENOUS at 11:49

## 2020-11-04 RX ADMIN — GLYCOPYRROLATE 0.2 MG: 0.2 INJECTION, SOLUTION INTRAMUSCULAR; INTRAVENOUS at 11:58

## 2020-11-04 ASSESSMENT — PAIN DESCRIPTION - DESCRIPTORS
DESCRIPTORS: ACHING
DESCRIPTORS: SORE

## 2020-11-04 ASSESSMENT — PULMONARY FUNCTION TESTS
PIF_VALUE: 0
PIF_VALUE: 1
PIF_VALUE: 0
PIF_VALUE: 0

## 2020-11-04 ASSESSMENT — PAIN DESCRIPTION - LOCATION
LOCATION: KNEE

## 2020-11-04 ASSESSMENT — PAIN DESCRIPTION - PROGRESSION
CLINICAL_PROGRESSION: GRADUALLY IMPROVING
CLINICAL_PROGRESSION: GRADUALLY WORSENING
CLINICAL_PROGRESSION: GRADUALLY WORSENING

## 2020-11-04 ASSESSMENT — PAIN DESCRIPTION - ORIENTATION
ORIENTATION: RIGHT

## 2020-11-04 ASSESSMENT — PAIN SCALES - GENERAL
PAINLEVEL_OUTOF10: 0
PAINLEVEL_OUTOF10: 6
PAINLEVEL_OUTOF10: 8
PAINLEVEL_OUTOF10: 8
PAINLEVEL_OUTOF10: 6
PAINLEVEL_OUTOF10: 5

## 2020-11-04 ASSESSMENT — PAIN DESCRIPTION - ONSET: ONSET: GRADUAL

## 2020-11-04 ASSESSMENT — PAIN DESCRIPTION - FREQUENCY
FREQUENCY: CONTINUOUS

## 2020-11-04 ASSESSMENT — PAIN - FUNCTIONAL ASSESSMENT: PAIN_FUNCTIONAL_ASSESSMENT: 0-10

## 2020-11-04 ASSESSMENT — PAIN DESCRIPTION - PAIN TYPE
TYPE: SURGICAL PAIN
TYPE: SURGICAL PAIN

## 2020-11-04 ASSESSMENT — ENCOUNTER SYMPTOMS: SHORTNESS OF BREATH: 0

## 2020-11-04 NOTE — H&P
Preoperative H&P Update     The patient's History and Physical in the medical record dated 11/4/20 was reviewed by me today. I reviewed the HPI, medications, allergies, reason for surgery, diagnosis and treatment plan and there has been no change. The patient was evaluated by me today. Prior to Visit Medications    Medication Sig Taking? Authorizing Provider   Multiple Vitamins-Minerals (MULTIVITAMIN PO) Take 1 tablet by mouth daily. Yes Historical Provider, MD       Physical exam findings for this update include:  Vitals:    11/04/20 0730   BP: 117/74   Pulse: 59   Resp: 18   Temp: 96.4 °F (35.8 °C)   SpO2: 97%     Airway is intact Chest: breathing comfortably  Heart: regular rate and rhythm. Examination of the body region where surgery is to be performed shows skin is intact at the operative site. The risk, benefits, and alternatives of the proposed procedure have been explained to the patient (or appropriate guardian) and understanding verbalized. All questions answered. Patient wishes to proceed. The patient was counseled at length about the risks of kesha Covid-19 during their perioperative period and any recovery window from their procedure. The patient was made aware that kesha Covid-19  may worsen their prognosis for recovering from their procedure  and lend to a higher morbidity and/or mortality risk. All material risks, benefits, and reasonable alternatives including postponing the procedure were discussed. The patient does wish to proceed with the procedure at this time.           Yamile Escobar MD    Electronically signed by Johanny Mullins MD on 11/4/2020 at 9:04 AM

## 2020-11-04 NOTE — ANESTHESIA POSTPROCEDURE EVALUATION
(35.8 °C), Temp src:Temporal, Pulse:59, Resp:18, SpO2:97 %, Height:6' 2\" (1.88 m), Weight:221 lb 3.7 oz (100.4 kg)     LABS:    CBC  Lab Results       Component                Value               Date/Time                  WBC                      3.7 (L)             10/07/2020 11:00 AM        HGB                      14.9                10/07/2020 11:00 AM        HCT                      43.9                10/07/2020 11:00 AM        PLT                      212                 10/07/2020 11:00 AM   RENAL  Lab Results       Component                Value               Date/Time                  NA                       142                 10/07/2020 11:00 AM        K                        4.3                 10/07/2020 11:00 AM        K                        4.6                 06/13/2020 12:43 PM        CL                       107                 10/07/2020 11:00 AM        CO2                      24                  10/07/2020 11:00 AM        BUN                      13                  10/07/2020 11:00 AM        CREATININE               0.9                 10/07/2020 11:00 AM        GLUCOSE                  95                  10/07/2020 11:00 AM        GLUCOSE                  89                  06/24/2011 01:09 PM   COAGS  Lab Results       Component                Value               Date/Time                  PROTIME                  11.4                10/07/2020 11:00 AM        INR                      0.98                10/07/2020 11:00 AM        APTT                     30.1                10/07/2020 11:00 AM     Intake & Output:  @70DHSC@    Nausea & Vomiting:  No    Level of Consciousness:  Awake    Pain Assessment:  Adequate analgesia    Anesthesia Complications:  No apparent anesthetic complications    SUMMARY      Vital signs stable  OK to discharge from Stage I post anesthesia care.   Care transferred from Anesthesiology department on discharge from perioperative area

## 2020-11-04 NOTE — ANESTHESIA PRE PROCEDURE
[facility] Department of Anesthesiology  Pre-Anesthesia Evaluation/Consultation       Name:  Koko Ramirez  : 1960  Age:  61 y.o. MRN:  [id number]  Date: 2020           Surgeon: Surgeon(s):  Luberta Osler, MD Cyndra Bode, MD    Procedure: Procedure(s):  ROBOTIC ASSISTED RIGHT TOTAL KNEE REPLACEMENT     No Known Allergies  There is no problem list on file for this patient. Past Medical History:   Diagnosis Date    Arthritis      Past Surgical History:   Procedure Laterality Date    HERNIA REPAIR Right     inguinal hernia X 2    KNEE ARTHROSCOPY Left     KNEE ARTHROSCOPY Right      Social History     Tobacco Use    Smoking status: Current Some Day Smoker     Packs/day: 0.00     Years: 15.00     Pack years: 0.00     Types: Cigars    Smokeless tobacco: Never Used    Tobacco comment: rare   Substance Use Topics    Alcohol use: Yes     Comment: Occ     Drug use: Never     Medications  No current facility-administered medications on file prior to encounter. Current Outpatient Medications on File Prior to Encounter   Medication Sig Dispense Refill    Multiple Vitamins-Minerals (MULTIVITAMIN PO) Take 1 tablet by mouth daily.        Current Facility-Administered Medications   Medication Dose Route Frequency Provider Last Rate Last Dose    0.9 % sodium chloride infusion   Intravenous Continuous Abbey James  mL/hr at 20 0855      sodium chloride flush 0.9 % injection 10 mL  10 mL Intravenous 2 times per day Abbey James MD        sodium chloride flush 0.9 % injection 10 mL  10 mL Intravenous PRN Abbey James MD        ceFAZolin (ANCEF) 2 g in dextrose 5 % 100 mL IVPB  2 g Intravenous Once Luberta Osler, MD         Vital Signs (Current)   Vitals:    10/08/20 1028 20 1052 20 0730   BP:   117/74   Pulse:   59   Resp:   18   Temp:   96.4 °F (35.8 °C)   TempSrc:   Temporal   SpO2:   97%   Weight: 225 lb (102.1 kg) 225 lb (102.1 kg) 221 lb 3.7 oz (100.4 kg)   Height: 6' 2\" (1.88 m) 6' 2\" (1.88 m) 6' 2\" (1.88 m)                                          BP Readings from Last 3 Encounters:   20 117/74   20 128/72   06/15/20 118/78     Vital Signs Statistics (for past 48 hrs)     Temp  Av.4 °F (35.8 °C)  Min: 96.4 °F (35.8 °C)   Min taken time: 20 0730  Max: 96.4 °F (35.8 °C)   Max taken time: 20 0730  Pulse  Av  Min: 61   Min taken time: 20 0730  Max: 61   Max taken time: 20 0730  Resp  Av  Min: 25   Min taken time: 20 0730  Max: 25   Max taken time: 20 0730  BP  Min: 117/74   Min taken time: 20 0730  Max: 117/74   Max taken time: 20 0730  SpO2  Av %  Min: 97 %   Min taken time: 20 0730  Max: 97 %   Max taken time: 20 0730  BP Readings from Last 3 Encounters:   20 117/74   20 128/72   06/15/20 118/78       BMI  Body mass index is 28.4 kg/m². Estimated body mass index is 28.4 kg/m² as calculated from the following:    Height as of this encounter: 6' 2\" (1.88 m). Weight as of this encounter: 221 lb 3.7 oz (100.4 kg).     CBC   Lab Results   Component Value Date    WBC 3.7 10/07/2020    RBC 4.96 10/07/2020    HGB 14.9 10/07/2020    HCT 43.9 10/07/2020    MCV 88.4 10/07/2020    RDW 13.5 10/07/2020     10/07/2020     CMP    Lab Results   Component Value Date     10/07/2020    K 4.3 10/07/2020    K 4.6 2020     10/07/2020    CO2 24 10/07/2020    BUN 13 10/07/2020    CREATININE 0.9 10/07/2020    GFRAA >60 10/07/2020    AGRATIO 1.8 2020    LABGLOM >60 10/07/2020    LABGLOM 70.9 2011    GLUCOSE 95 10/07/2020    GLUCOSE 89 2011    PROT 6.5 2020    PROT 6.5 2011    CALCIUM 9.1 10/07/2020    BILITOT 0.4 2020    ALKPHOS 52 2020    AST 15 2020    ALT 13 2020     BMP    Lab Results   Component Value Date     10/07/2020    K 4.3 10/07/2020    K 4.6 06/13/2020     10/07/2020    CO2 24 10/07/2020    BUN 13 10/07/2020    CREATININE 0.9 10/07/2020    CALCIUM 9.1 10/07/2020    GFRAA >60 10/07/2020    LABGLOM >60 10/07/2020    LABGLOM 70.9 06/24/2011    GLUCOSE 95 10/07/2020    GLUCOSE 89 06/24/2011     POCGlucose  No results for input(s): GLUCOSE in the last 72 hours. [facility]    Lab Results   Component Value Date    PROTIME 11.4 10/07/2020    INR 0.98 10/07/2020    APTT 30.1 49/26/5106     HCG (If Applicable) No results found for: PREGTESTUR, PREGSERUM, HCG, HCGQUANT   ABGs No results found for: PHART, PO2ART, YZU6WZH, JZL9DVC, BEART, D0VKPPJJ   Type & Screen (If Applicable)  No results found for: LABABO, LABRH                         BMI: Wt Readings from Last 3 Encounters:       NPO Status:   Date of last liquid consumption: 11/04/20   Time of last liquid consumption: 0630   Date of last solid food consumption: 11/03/20      Time of last solid consumption: 2100       Anesthesia Evaluation  Patient summary reviewed  Airway: Mallampati: II  TM distance: >3 FB   Neck ROM: full  Mouth opening: > = 3 FB Dental: normal exam         Pulmonary:       (-) COPD, asthma and shortness of breath                           Cardiovascular:        (-) hypertension, valvular problems/murmurs, past MI, CAD, CABG/stent, dysrhythmias and  angina                Neuro/Psych:      (-) seizures, TIA and CVA           GI/Hepatic/Renal:        (-) GERD, PUD, liver disease and no renal disease       Endo/Other:    (+) : arthritis:., .    (-) diabetes mellitus               Abdominal:           Vascular: negative vascular ROS. Anesthesia Plan      spinal     ASA 2       Induction: intravenous. Anesthetic plan and risks discussed with patient and spouse. Plan discussed with CRNA.                   This pre-anesthesia assessment may be used as a history and physical.    DOS STAFF ADDENDUM:    Pt seen and examined, chart reviewed (including anesthesia, drug and allergy history). No interval changes to history and physical examination. Anesthetic plan, risks, benefits, alternatives, and personnel involved discussed with patient. Patient verbalized an understanding and agrees to proceed.       Cydney Lopez MD  November 4, 2020  11:08 AM

## 2020-11-04 NOTE — PLAN OF CARE
Problem: Tobacco Use:  Goal: Inpatient tobacco use cessation counseling participation  Description: Inpatient tobacco use cessation counseling participation  Outcome: Completed   Educated on tobacco cessation. Patient verbalized understanding. Problem: Discharge Planning:  Goal: Discharged to appropriate level of care  Description: Discharged to appropriate level of care  Outcome: Completed   Patient educated on discharge plan and assessed to determine that needs are being met. Questions answered for patient. Patient encouraged to ask questions and voice concerns/comments in regards to barriers for discharge and any other questions about the plan of care. Problem: Mobility - Impaired:  Goal: Mobility will improve  Description: Mobility will improve  Outcome: Completed   Mobility is improving, patient is able to ambulate in room and to bathroom with walker and x1 assistance. Will cont to monitor and reassess. Problem: Infection - Surgical Site:  Goal: Will show no infection signs and symptoms  Description: Will show no infection signs and symptoms  Outcome: Completed   Surgical dressing is clean, dry, and intact, no odor or drainage noted. Will continue to monitor and reassess. Problem: Pain - Acute:  Goal: Pain level will decrease  Description: Pain level will decrease  Outcome: Completed   Pt assessed for pain. Pt in pain and assessed with 0-10 pain rating scale. Pt given prescribed analgesic for pain. (See eMar) Pt satisfied with pain relief thus far. Will reassess and continue to monitor.    Electronically signed by Angela Tsai RN on 11/4/2020 at 4:30 PM

## 2020-11-04 NOTE — BRIEF OP NOTE
Brief Postoperative Note      Patient: Shelby Higginbotham  YOB: 1960  MRN: [id number]    Date of Procedure: 11/4/2020    Pre-Op Diagnosis: RIGHT ARTHRITIS KNEE    Post-Op Diagnosis: Same       Procedure(s):  Kirtland Habermann    Surgeons  Dr. Jazmine Kowalski    Assistant:  Physician Assistant: MUSHTAQ Fraire    Anesthesia: spinal    Estimated Blood Loss (mL): Minimal    Complications: None    Specimens:   ID Type Source Tests Collected by Time Destination   A : A) Right knee bone and tissue Bone Joint, Knee SURGICAL PATHOLOGY Joseph Chavis MD 11/4/2020 1225        Implants:  Implant Name Type Inv. Item Serial No.  Lot No. LRB No. Used Action   BASEPLATE TIB SZ 7 [id number] ML80MM KNEE TRITANIUM 4 CRUCFRM  BASEPLATE TIB SZ 7 [id number] ML80MM KNEE TRITANIUM 4 CRUCFRM  [employer] HOWM-WD [id number] Right 1 Implanted   INSERT TIB CS 7 10 MM ARTC POST KNEE BEAR TECHNOLOGY X3  INSERT TIB CS 7 10 MM ARTC POST KNEE BEAR TECHNOLOGY X3  [employer] MANJIT-WD [id number] Right 1 Implanted         Drains: * No LDAs found *    Findings: OA R knee    The patient was counseled at length about the risks of kesha Covid-19 during their perioperative period and any recovery window from their procedure. The patient was made aware that kesha Covid-19  may worsen their prognosis for recovering from their procedure  and lend to a higher morbidity and/or mortality risk. All material risks, benefits, and reasonable alternatives including postponing the procedure were discussed. The patient does wish to proceed with the procedure at this time.           Electronically signed by Joseph Chavis MD on 11/4/2020 at 12:56 PM

## 2020-11-04 NOTE — PROGRESS NOTES
Data- discharge order received, patient verbalized agreement to discharge, disposition to previous residence, needs noted for HHC/DME and informed Cinthya Becerra NP. Action- discharge instructions prepared and given to patient and wife, patient and wife verbalized understanding. Medication information packet given r/t NEW and/or CHANGED prescriptions emphasizing name/purpose/side effects, pt verbalized understanding. Discharge instruction summary: Diet- general, Activity- wbat, Primary Care Physician as followsKomalan President, APRMALLY - [phone]. f/u appointment with orthopedic office noted below, immunizations reviewed and discussed with patient, prescription medications to be filled by retail pharmacy and then delivered. Inpatient surgical procedure precautions reviewed: . Neurovascular check performed and patient is WNLs, denies numbness/tingling in extremties. Incision site covered by ace dressing assessed and is  clean,dry, and intact, no signs of redness, drainage, or odor noted. patient's bedside RN isaac notified of patient completing discharge instructions. incentive spirometer provided to patient and educated on purpose and use, patient demonstrated understanding. Response-  Medications delivered to patient via meds to bed program. Disposition is home with [place] [id number] (DME  delivered to patient ), to be transported by wife, no complications.      Future Appointments   Date Time Provider Wilma Drew   11/19/2020 10:00 AM Luberta Osler, MD [facility]   11/25/2020  8:00 AM SCHEDULE, [facility]   12/1/2020  7:35 AM Luberta Osler, MD [employer] [facility]   12/17/2020  1:00 PM MUSHTAQ Stover     Electronically signed by Joyce Jackson RN on 11/4/2020 at 4:31 PM

## 2020-11-04 NOTE — PROGRESS NOTES
1720 Attempt to stand and void without success. Patient states \" genitales  are numb\". Continue to monitor. Plan to walk to BR.

## 2020-11-04 NOTE — PROGRESS NOTES
Oskar Nice was evaluated today and a DME order was entered for a wheeled walker because he requires this to successfully complete daily living tasks of ambulating. A wheeled walker is necessary due to the patient's unsteady gait, upper body weakness, and inability to  an ambulation device; and he can ambulate only by pushing a walker instead of a lesser assistive device such as a cane, crutch, or standard walker. The need for this equipment was discussed with the patient and he understands and is in agreement.   Electronically signed by Arturo Zamudio RN on 11/4/2020 at 4:56 PM

## 2020-11-04 NOTE — PROGRESS NOTES
[id number] [facility] Orthopedic Surgery   Progress Note      S/P :  SUBJECTIVE  In PACU Awake and oriented post spinal anesthesia for magdiel Morel. Pain is   described in right  knee and with the intensity of none. OBJECTIVE              Physical                      VITALS:  /81   Pulse 60   Temp 97.1 °F (36.2 °C) (Temporal)   Resp 16   Ht 6' 2\" (1.88 m)   Wt 221 lb 3.7 oz (100.4 kg)   SpO2 99%   BMI 28.40 kg/m²                     MUSCULOSKELETAL:  Right foot warm and pink with palpable pulse. Able to wiggle left toes a bit but not right at this time. NEUROLOGIC:                                  Sensory:  Touch:  Right Lower Extremity:  abnormal - minimal sensation in foot  Left Lower Extremity:  abnormal - minimal sensation in foot                                                 Surgical wound appears clean and dry right knee with ACE and ice pack on.      Data       CBC:   Lab Results   Component Value Date    WBC 3.7 10/07/2020    RBC 4.96 10/07/2020    HGB 14.9 10/07/2020    HCT 43.9 10/07/2020    MCV 88.4 10/07/2020    MCH 30.0 10/07/2020    MCHC 34.0 10/07/2020    RDW 13.5 10/07/2020     10/07/2020    MPV 9.6 10/07/2020        WBC:    Lab Results   Component Value Date    WBC 3.7 10/07/2020        Hemoglobin/Hematocrit:    Lab Results   Component Value Date    HGB 14.9 10/07/2020    HCT 43.9 10/07/2020        PT/INR:    Lab Results   Component Value Date    PROTIME 11.4 10/07/2020    INR 0.98 10/07/2020              Current Inpatient Medications             Current Facility-Administered Medications: 0.9 % sodium chloride infusion, , Intravenous, Continuous  sodium chloride flush 0.9 % injection 10 mL, 10 mL, Intravenous, 2 times per day  sodium chloride flush 0.9 % injection 10 mL, 10 mL, Intravenous, PRN  HYDROmorphone (DILAUDID) injection 0.25 mg, 0.25 mg, Intravenous, Q5 Min PRN  HYDROmorphone (DILAUDID) injection 0.5 mg, 0.5 mg, Intravenous, Q5 Min PRN  morphine (PF) injection 1 mg, 1 mg, Intravenous, Q5 Min PRN  morphine (PF) injection 2 mg, 2 mg, Intravenous, Q5 Min PRN  oxyCODONE-acetaminophen (PERCOCET) 5-325 MG per tablet 1 tablet, 1 tablet, Oral, PRN **OR** oxyCODONE-acetaminophen (PERCOCET) 5-325 MG per tablet 2 tablet, 2 tablet, Oral, PRN  ondansetron (ZOFRAN) injection 4 mg, 4 mg, Intravenous, Once PRN  labetalol (NORMODYNE;TRANDATE) injection 5 mg, 5 mg, Intravenous, Q10 Min PRN  hydrALAZINE (APRESOLINE) injection 5 mg, 5 mg, Intravenous, Q15 Min PRN  meperidine (DEMEROL) injection 12.5 mg, 12.5 mg, Intravenous, Q5 Min PRN  povidone-iodine (BETADINE) 10 % 118 mL in sodium chloride 0.9 % 1,000 mL irrigation, , , Continuous PRN  tranexamic acid (CYKLOKAPRON) 1,000 mg in sodium chloride 0.9 % 50 mL IVPB, 1,000 mg, Intravenous, Once  acetaminophen (TYLENOL) tablet 1,000 mg, 1,000 mg, Oral, Once  ceFAZolin (ANCEF) 2 g in dextrose 5 % 100 mL IVPB, 2 g, Intravenous, Once    ASSESSMENT AND PLAN      Post right TKA, stable exam  DVT prophylaxis ordered, Plan ASA 81mg twice at day for 14 days for DVT prophylaxis  Reviewed with patient importance of SAL hose on daily/ off at  Night for 2 weeks as well and continue anticoagulant medication at home as ordered to reduce risk of postoperative DVT or PE clots. Pt verbalized understanding.   Will require showing ability to use walker safely prior to DC today  Up with assistance  Home today, Home care to see pt tomorrow  IV or PO pain med as ordered      Clara Cornejo  11/4/2020  2:01 PM

## 2020-11-04 NOTE — PROGRESS NOTES
Message left with [place] NP about inability to void. Palpated bladder, does not feel distended. No complaint of discomfort.

## 2020-11-05 ENCOUNTER — TELEPHONE (OUTPATIENT)
Dept: ORTHOPEDICS UNIT | Age: 60
End: 2020-11-05

## 2020-11-05 NOTE — TELEPHONE ENCOUNTER
Spoke with patient regarding post discharge from hospital.    Incision status: has not looked at incision today yet,  has removed ace bandage and to remove the cotton roll in place this afternoon. Edema/Swelling/Teds: edema noted, wearing teds    Pain level and status: increased pain today, only taking 5mg oxycodone at a time and plans to increase to 10mg for next dose    Use of pain medications: yes ; Patient stated they are taking their pain medication oxycodone as prescribed. Use of ice therapy: yes     Blood thinner: aspirin ; Verified with patient that they are taking their anticoagulant as prescribed twice a day. Bowels: no bm yet, passing flatus, educated patient to take laxative prn, patient verbalized understanding. Home Care Agency active: yes ; Claims spoke to [facility] agency to see patient tomorrow . Outpatient therapy: n/a    Do you have all of your medications: yes    Changes in medications: no    Ortho Vitals: questions addressed. No other questions/concerns at this time. Encouraged patient to call Orthopedic Nurse [phone] [address] or Orthopedic office if has any questions/concerns.       Follow up appointments:    Future Appointments   Date Time Provider Wilma Drew   11/19/2020 10:00 AM MD TERENCE Warner ORTHO [facility]   11/25/2020  8:00 AM [facility]EDU[facility]   12/1/2020  7:35 AM MD TERENCE Warner ORTHO [facility]   12/17/2020  1:00 PM MUSHTAQ Fernandes     Electronically signed by Raul Stephen RN on 11/5/2020 at 12:15 PM

## 2020-11-05 NOTE — PROGRESS NOTES
Bladder scan for >297. Straight cath done for 300 ml Clear yellow urine. Stated he didn't even feel it. Wife at bedside. Updated and reminded that if he does not void in 8 hours may need to return to the ED. Understands.

## 2020-11-05 NOTE — OP NOTE
third robotic aerial then,  the knee was registered with the robot and then the implant was  virtually manipulated to balance the knee in both flexion and extension. Next, the knee while completely exposed, a cutting arm was brought in  and in the order of posterior femoral condyles, anterior femoral  condyle, anterior femoral chamfer cuts, tibial cut, posterior femoral  chamfer cuts, and distal femoral condylar cut were made. All the bony  fragments were removed. The knee was reconstructed in trial form with a  #7 tibia, a #6 cruciate-retaining femur, a 39-mm patella, and a 10-mm  tibial tray insert. The knee came to full extension, excellent flexion,  and good overall stability, and the patella tracked well. Lateral  release was not performed. Then, all trial implants were removed, and the ends of the bones were  irrigated off. In the order of tibia, femur and then patella, the #7  tibial tray uncemented, the #6 cruciate-retaining [place] [employer] was  hammered into the distal end of the femur and the 39-mm uncemented  patella was squeezed on the back of the patella. The 10-mm spacer was  placed and then the knee was take through range of motion with full  extension, calf-on-thigh flexion and the patella tracked well. The wound was irrigated out. Hemostasis was obtained. The wound was  injected with 100 mL of ropivacaine, morphine, cefuroxime, and  methylprednisolone. It was also bathed with 3 gm of tranexamic acid for  five minutes. Next, the wound was lavaged with aqueous iodine and then  1 gm of vancomycin powder was placed. The wound was closed in layers. A dressing was applied, and the patient was brought to recovery room in  good condition. VIKAS Nelson MD    D: 11/04/2020 13:02:12       T: 11/04/2020 13:44:14     [id number]  Job#: [id number]     Doc#: [id number]    CC:

## 2020-11-16 ENCOUNTER — PREP FOR PROCEDURE (OUTPATIENT)
Dept: ORTHOPEDICS UNIT | Age: 60
End: 2020-11-16

## 2020-11-16 RX ORDER — OXYCODONE HYDROCHLORIDE 10 MG/1
10 TABLET ORAL ONCE
Status: CANCELLED | OUTPATIENT
Start: 2020-11-16 | End: 2020-11-16

## 2020-11-16 RX ORDER — CELECOXIB 200 MG/1
200 CAPSULE ORAL ONCE
Status: CANCELLED | OUTPATIENT
Start: 2020-11-16 | End: 2020-11-16

## 2020-11-19 ENCOUNTER — OFFICE VISIT (OUTPATIENT)
Dept: ORTHOPEDIC SURGERY | Age: 60
End: 2020-11-19

## 2020-11-19 ENCOUNTER — PREP FOR PROCEDURE (OUTPATIENT)
Dept: ORTHOPEDIC SURGERY | Age: 60
End: 2020-11-19

## 2020-11-19 VITALS — HEIGHT: 74 IN | WEIGHT: 221 LBS | TEMPERATURE: 97.5 F | BODY MASS INDEX: 28.36 KG/M2

## 2020-11-19 PROCEDURE — 99024 POSTOP FOLLOW-UP VISIT: CPT | Performed by: ORTHOPAEDIC SURGERY

## 2020-11-19 NOTE — PROGRESS NOTES
This dictation was done with [employer] dictation and may contain mechanical errors related to translation. Temperature 97.5 °F (36.4 °C), temperature source Infrared, height 6' 2\" (1.88 m), weight 221 lb (100.2 kg).

## 2020-11-20 ENCOUNTER — HOSPITAL ENCOUNTER (OUTPATIENT)
Age: 60
Discharge: HOME | End: 2020-11-20
Payer: COMMERCIAL

## 2020-11-20 LAB
ABO/RH: NORMAL
ALBUMIN SERPL-MCNC: 4.2 G/DL (ref 3.4–5)
ANION GAP SERPL CALCULATED.3IONS-SCNC: 10 MMOL/L (ref 3–16)
ANTIBODY SCREEN: NORMAL
APTT: 32.2 SEC (ref 24.2–36.2)
BASOPHILS ABSOLUTE: 0 K/UL (ref 0–0.2)
BASOPHILS RELATIVE PERCENT: 0.9 %
BILIRUBIN URINE: NEGATIVE
BLOOD, URINE: NEGATIVE
BUN BLDV-MCNC: 18 MG/DL (ref 7–20)
CALCIUM SERPL-MCNC: 9 MG/DL (ref 8.3–10.6)
CHLORIDE BLD-SCNC: 103 MMOL/L (ref 99–110)
CLARITY: CLEAR
CO2: 25 MMOL/L (ref 21–32)
COLOR: YELLOW
CREAT SERPL-MCNC: 0.8 MG/DL (ref 0.9–1.3)
EOSINOPHILS ABSOLUTE: 0.2 K/UL (ref 0–0.6)
EOSINOPHILS RELATIVE PERCENT: 3.7 %
ESTIMATED AVERAGE GLUCOSE: 105.4 MG/DL
GFR AFRICAN AMERICAN: >60
GFR NON-AFRICAN AMERICAN: >60
GLUCOSE BLD-MCNC: 93 MG/DL (ref 70–99)
GLUCOSE URINE: NEGATIVE MG/DL
HBA1C MFR BLD: 5.3 %
HCT VFR BLD CALC: 39.2 % (ref 40.5–52.5)
HEMOGLOBIN: 13.2 G/DL (ref 13.5–17.5)
INR BLD: 1.08 (ref 0.86–1.14)
KETONES, URINE: NEGATIVE MG/DL
LEUKOCYTE ESTERASE, URINE: NEGATIVE
LYMPHOCYTES ABSOLUTE: 1.3 K/UL (ref 1–5.1)
LYMPHOCYTES RELATIVE PERCENT: 22.5 %
MCH RBC QN AUTO: 30.2 PG (ref 26–34)
MCHC RBC AUTO-ENTMCNC: 33.7 G/DL (ref 31–36)
MCV RBC AUTO: 89.5 FL (ref 80–100)
MICROSCOPIC EXAMINATION: NORMAL
MONOCYTES ABSOLUTE: 0.6 K/UL (ref 0–1.3)
MONOCYTES RELATIVE PERCENT: 10.9 %
NEUTROPHILS ABSOLUTE: 3.5 K/UL (ref 1.7–7.7)
NEUTROPHILS RELATIVE PERCENT: 62 %
NITRITE, URINE: NEGATIVE
PDW BLD-RTO: 13.1 % (ref 12.4–15.4)
PH UA: 5 (ref 5–8)
PLATELET # BLD: 351 K/UL (ref 135–450)
PMV BLD AUTO: 8.5 FL (ref 5–10.5)
POTASSIUM SERPL-SCNC: 4.2 MMOL/L (ref 3.5–5.1)
PREALBUMIN: 25.7 MG/DL (ref 20–40)
PROTEIN UA: NEGATIVE MG/DL
PROTHROMBIN TIME: 12.5 SEC (ref 10–13.2)
RBC # BLD: 4.38 M/UL (ref 4.2–5.9)
SEDIMENTATION RATE, ERYTHROCYTE: 15 MM/HR (ref 0–20)
SODIUM BLD-SCNC: 138 MMOL/L (ref 136–145)
SPECIFIC GRAVITY UA: 1.03 (ref 1–1.03)
URINE REFLEX TO CULTURE: NORMAL
URINE TYPE: NORMAL
UROBILINOGEN, URINE: 0.2 E.U./DL
VITAMIN D 25-HYDROXY: 25.7 NG/ML
WBC # BLD: 5.7 K/UL (ref 4–11)

## 2020-11-20 PROCEDURE — 85610 PROTHROMBIN TIME: CPT

## 2020-11-20 PROCEDURE — 86850 RBC ANTIBODY SCREEN: CPT

## 2020-11-20 PROCEDURE — 85025 COMPLETE CBC W/AUTO DIFF WBC: CPT

## 2020-11-20 PROCEDURE — 85730 THROMBOPLASTIN TIME PARTIAL: CPT

## 2020-11-20 PROCEDURE — 82306 VITAMIN D 25 HYDROXY: CPT

## 2020-11-20 PROCEDURE — 36415 COLL VENOUS BLD VENIPUNCTURE: CPT

## 2020-11-20 PROCEDURE — 86901 BLOOD TYPING SEROLOGIC RH(D): CPT

## 2020-11-20 PROCEDURE — 87641 MR-STAPH DNA AMP PROBE: CPT

## 2020-11-20 PROCEDURE — 82040 ASSAY OF SERUM ALBUMIN: CPT

## 2020-11-20 PROCEDURE — 85652 RBC SED RATE AUTOMATED: CPT

## 2020-11-20 PROCEDURE — 84134 ASSAY OF PREALBUMIN: CPT

## 2020-11-20 PROCEDURE — 81003 URINALYSIS AUTO W/O SCOPE: CPT

## 2020-11-20 PROCEDURE — 80048 BASIC METABOLIC PNL TOTAL CA: CPT

## 2020-11-20 PROCEDURE — 86900 BLOOD TYPING SEROLOGIC ABO: CPT

## 2020-11-20 PROCEDURE — 83036 HEMOGLOBIN GLYCOSYLATED A1C: CPT

## 2020-11-21 LAB — MRSA SCREEN RT-PCR: NORMAL

## 2020-11-22 NOTE — PROGRESS NOTES
Patient is a 54-year-old male status post right total knee arthroplasty performed 11/4/2020. He is here for his fourth postoperative evaluation is doing reasonably well. This patient evidently is on schedule on December 1 for his contralateral left knee arthroplasty. Physical examination 54-year-old male oriented x3 temperature is 97.5. Examination of his right knee shows a stable anterior wound with no signs of drainage or deep sepsis. Range of motion is about -5 degrees of full extension to 84 degrees of flexion. No obvious signs of instability are noted in the right knee. Distal neurovascular examinations intact to the right foot and ankle. X-rays obtained AP lateral patellofemoral view of the right knee show status post uncemented right total knee arthroplasty. Stable overall orientation alignment with no signs of obvious loosening or acute failure. Good patellofemoral mechanics are seen no other obvious fractures tumors or dislocations are seen on these x-rays. Impression 54-year-old male stable status post right total knee arthroplasty. Plan continue physical therapy and we will add manipulation to his upcoming left total knee arthroplasty event. We discussed this with the patient he understands the purpose of increasing his range of motion is much as possible. We will follow him up in 2 to 3 weeks or as needed.

## 2020-11-24 ENCOUNTER — HOSPITAL ENCOUNTER (OUTPATIENT)
Dept: PHYSICAL THERAPY | Age: 60
Setting detail: THERAPIES SERIES
Discharge: HOME | End: 2020-11-24
Payer: COMMERCIAL

## 2020-11-24 PROCEDURE — 97161 PT EVAL LOW COMPLEX 20 MIN: CPT

## 2020-11-24 PROCEDURE — 97110 THERAPEUTIC EXERCISES: CPT

## 2020-11-24 ASSESSMENT — PAIN DESCRIPTION - PROGRESSION: CLINICAL_PROGRESSION: GRADUALLY IMPROVING

## 2020-11-24 ASSESSMENT — PAIN SCALES - GENERAL: PAINLEVEL_OUTOF10: 3

## 2020-11-24 ASSESSMENT — PAIN DESCRIPTION - ORIENTATION: ORIENTATION: RIGHT

## 2020-11-24 ASSESSMENT — PAIN - FUNCTIONAL ASSESSMENT: PAIN_FUNCTIONAL_ASSESSMENT: PREVENTS OR INTERFERES SOME ACTIVE ACTIVITIES AND ADLS

## 2020-11-24 ASSESSMENT — PAIN DESCRIPTION - LOCATION: LOCATION: KNEE

## 2020-11-24 ASSESSMENT — PAIN DESCRIPTION - PAIN TYPE: TYPE: SURGICAL PAIN

## 2020-11-24 NOTE — PLAN OF CARE
Outpatient Physical Therapy  [] [facility]    Phone: [phone]   Fax: [phone]   [x] [facility]  Phone: [phone]              Fax: [phone]  [] [facility]   Phone: [phone]   Fax: [phone]     To: Referring Practitioner: Dr. Gautam Thomas      Patient: Herson Larsen   : 1960   MRN: [id number]  Evaluation Date: 2020      Diagnosis Information:  · Diagnosis: S/P Right TKR   · Treatment Diagnosis: Pain on right knee S/P TKR, limited ROM on right knee, weakness, altered gait     Physical Therapy Certification/Re-Certification Form  Dear Dr. Gautam Thomas,  The following patient has been evaluated for physical therapy services and for therapy to continue, Medicare requires monthly physician review of the treatment plan. Please review the attached evaluation and/or summary of the patient's plan of care, and verify that you agree therapy should continue by signing the attached document and sending it back to our office. Plan of Care/Treatment to date:  [x] Therapeutic Exercise    [x] Modalities:  [x] Therapeutic Activity     [] Ultrasound  [] Electrical Stimulation  [x] Gait Training      [] Cervical Traction [] Lumbar Traction  [] Neuromuscular Re-education    [] Cold/hotpack [] Iontophoresis   [x] Instruction in HEP     Other:  [x] Manual Therapy      []             [] Aquatic Therapy      []           ? Frequency/Duration:  # Days per week: [x] 1 day # Weeks: [] 1 week [] 5 weeks     [x] 2 days? [x] 2 weeks [] 6 weeks     [] 3 days   [] 3 weeks [] 7 weeks     [] 4 days   [] 4 weeks [] 8 weeks    Rehab Potential: [x] Excellent [] Good [] Fair  [] Poor       Electronically signed by:  Doris Negrete PT      If you have any questions or concerns, please don't hesitate to call.   Thank you for your referral.      Physician Signature:________________________________Date:__________________  By signing above, therapists plan is approved by physician

## 2020-11-24 NOTE — PROGRESS NOTES
Physical Therapy  Initial Assessment  Date: 2020  Patient Name: Viviana Reynolds  MRN: [id number]  : 1960     Treatment Diagnosis: Pain on right knee S/P TKR, limited ROM on right knee, weakness, altered gait    Restrictions  Restrictions/Precautions  Restrictions/Precautions: (No risk of falls)  Required Braces or Orthoses?: No    Subjective   General  Chart Reviewed: Yes  Patient assessed for rehabilitation services?: Yes  Additional Pertinent Hx: PLOF: Independent  Family / Caregiver Present: No  Referring Practitioner: Dr. Catarina Black  Referral Date : 20  Diagnosis: S/P Right TKR  PT Visit Information  Onset Date: 20  PT Insurance Information: [employer]  Total # of Visits Approved: 3  Total # of Visits to Date: 1  Subjective  Subjective: Pt had progressing OA on Right knee. He is on his feet  all the time for work  and the [employer] out\". He had surgery to replace the  right knee on 20 and will have the left knee TKR on 20. Home therapy has done well. Davian Griffiths He is extremely motivated.   Pain Screening  Patient Currently in Pain: Yes  Pain Assessment  Pain Assessment: 0-10  Pain Level: 3  Patient's Stated Pain Goal: No pain  Pain Type: Surgical pain  Pain Location: Knee  Pain Orientation: Right  Clinical Progression: Gradually improving  Functional Pain Assessment: Prevents or interferes some active activities and ADLs  Vital Signs  Patient Currently in Pain: Yes    Vision/Hearing  Hearing  Hearing: Exceptions to [facility]  Hearing Exceptions: Hard of hearing/hearing concerns         Social/Functional History  Social/Functional History  Lives With: Family  Type of Home: House  Home Layout: Two level  Home Access: Stairs to enter with rails  Entrance Stairs - Number of Steps: 2  Bathroom Shower/Tub: Tub/Shower unit  Bathroom Equipment: Shower chair  Home Equipment: Rolling walker;Cane  Receives Help From: Family  Active : No  Type of occupation: [address] [employer] hopes to be a back to  work 1/1/21    Objective     Observation/Palpation  Posture: Fair(Left genu varus)  Palpation: Minimal tenderness around right knee incision  Observation: No redness  Edema: Mod edema  Scar: 6 1/2 inches    PROM RLE (degrees)  RLE PROM: Exceptions  R Hip Flexion 0-125: WNL  R Knee Flexion 0-145: -2-96  R Knee Extension 0: -2  R Ankle Dorsiflexion 0-20: WNL  PROM LLE (degrees)  LLE PROM: Exceptions  L Hip Flexion 0-125: WNL  L Knee Flexion 0-145: 0-136  L Knee Extension 0: 0  L Ankle Dorsiflexion 0-20: WNL    Strength RLE  Strength RLE: WNL  Strength LLE  Strength LLE: WNL        Sensation  Overall Sensation Status: WFL             Ambulation  Ambulation?: Yes  Ambulation 1  Surface: carpet  Device: No Device  Assistance: Independent  Quality of Gait: Limping due to leg length discrepancy and genu varus on left knee        Assessment   Conditions Requiring Skilled Therapeutic Intervention  Body structures, Functions, Activity limitations: Decreased functional mobility ; Decreased ADL status; Decreased ROM; Decreased strength  Assessment: PLOF: Independent, doing extremely well  Treatment Diagnosis: Pain on right knee S/P TKR, limited ROM on right knee, weakness, altered gait  Prognosis: Excellent  Decision Making: Low Complexity  REQUIRES PT FOLLOW UP: Yes         Plan   Plan  Times per week: 1-2  Plan weeks: 2  Current Treatment Recommendations: Strengthening, ROM, Home Exercise Program, Manual Therapy - Soft Tissue Mobilization    G-Code       OutComes Score  LEFS Total Score: 48 (11/24/20 1259)                                               AM-PAC Score             Goals  Short term goals  Time Frame for Short term goals: 3 sessions  Short term goal 1: Pt will increase knee R ROM to 0-100  Short term goal 2: Pt will increase strength on right knee to normal functional strength  Short term goal 3: Pt will ambulate with less deviation in preparation for left TKR on 12/1/20  Patient Goals   Patient goals :

## 2020-11-24 NOTE — FLOWSHEET NOTE
Physical Therapy Daily Treatment Note  Date:  2020    Patient Name:  Naomi Whyte   \"SCOTT\"  :  1960  MRN: [id number]    Restrictions/Precautions: Restrictions/Precautions  Restrictions/Precautions: (No risk of falls)  Required Braces or Orthoses?: No    Pertinent Medical History: Additional Pertinent Hx: PLOF: Independent    Medical/Treatment Diagnosis Information:  · Diagnosis: S/P Right TKR  · Treatment Diagnosis: Pain on right knee S/P TKR, limited ROM on right knee, weakness, altered gait    Insurance/Certification information:  PT Insurance Information: [employer] submitted 1:29 pm 20. Number [id number]  Physician Information:  Referring Practitioner: Dr. Juliane Hernandez of care signed (Y/N):  Sent to Dr. Enedina Devine on 20    Visit# / total visits:  1/3  Pain level:  3/10     G-Code (if applicable):      Date / Visit # G-Code Applied:  /   Merary Yepez on eval = 48    Progress Note: []  Yes  [x]  No  Next due by: Visit #10      History of Injury: See below    Subjective:  Subjective  Subjective: Pt had progressing OA on Right knee. He is on his feet  all the time for work  and the [employer] out\". He had surgery to replace the  right knee on 20 and will have the left knee TKR on 20. Home therapy has done well. Elier Ellis He is extremely motivated. Objective: Se eval   Observation:    Test measurements:        Exercises:  Exercise/Equipment Resistance/Repetitions Other comments   Quad sets 15X    Supine hip abduction 15X    TKE 30X    SLR 30X    Standing hip abd 30X    Minisquats 30X    Heel slides 10X    TR/HR 10X    Standing hamstring curls 15X     Nu step 5 min, at [employer]    Leg press  Add   Wobble board  Add               Other Therapeutic Activities:  Patient was educated on diagnosis, plan of care and prognosis of their complaint. Also, frequency and duration of treatments was discussed. Patient was informed of the attendance policy and issued a copy for their records. Timed Code Treatment Minutes: 30   Total Treatment Minutes: 45     [x] EVAL (LOW) 28234   [] EVAL (MOD) 84152   [] EVAL (HIGH) 60183   [] RE-EVAL   [x] TE (59258) x  2     [] NMR (35368)   x    [] Manual (11714) x    [] Ultrasound (31902) x  [] TA (99601) x  [] Mech Traction (75521)  [] Ionto (82053)           [] ES (un) (44979):   [] Other:      Treatment/Activity Tolerance:  [x] Patient tolerated treatment well [] Patient limited by fatigue  [] Patient limited by pain  [] Patient limited by other medical complications  [] Other:     Prognosis: [x] Good [] Fair  [] Poor    Goals:    Short term goals  Time Frame for Short term goals: 3 sessions  Short term goal 1: Pt will increase knee R ROM to 0-100  Short term goal 2: Pt will increase strength on right knee to normal functional strength  Short term goal 3: Pt will ambulate with less deviation in preparation for left TKR on 12/1/20        Patient goals : \"Dexterity and movement\"      Patient Requires Follow-up: [x] Yes  [] No    Plan:   [] Continue per plan of care [] Alter current plan (see comments)  [x] Plan of care initiated [] Hold pending MD visit [] Discharge    Plan for Next Session: See above. Pt will have left TKR on 12/1/20 and possibly manip under anes on right knee as needed.     Electronically signed by:  Rand Morales, PT,

## 2020-11-25 ENCOUNTER — HOSPITAL ENCOUNTER (OUTPATIENT)
Dept: PHYSICAL THERAPY | Age: 60
Setting detail: THERAPIES SERIES
Discharge: HOME | End: 2020-11-25
Payer: COMMERCIAL

## 2020-11-25 ENCOUNTER — TELEPHONE (OUTPATIENT)
Dept: INTERNAL MEDICINE CLINIC | Age: 60
End: 2020-11-25

## 2020-11-25 PROCEDURE — 97110 THERAPEUTIC EXERCISES: CPT

## 2020-11-25 PROCEDURE — 97140 MANUAL THERAPY 1/> REGIONS: CPT

## 2020-11-25 NOTE — PROGRESS NOTES
C-Difficile admission screening and protocol:     * Admitted with diarrhea? n     *Prior history of C-Diff. In last 3 months?n     *Antibiotic use in the past 6-8 weeks? YES     *Prior hospitalization or nursing home in the last month?  n      [address] time______630______        Surgery time____________    Take the following medications with a sip of water:    Do not eat or drink anything after 12:00 midnight prior to your surgery. This includes water chewing gum, mints and ice chips. You may brush your teeth and gargle the morning of your surgery, but do not swallow the water     Please see your family doctor/pediatrician for a history and physical and/or concerning medications. Bring any test results/reports from your physicians office. If you are under the care of a heart doctor or specialist doctor, please be aware that you may be asked to them for clearance    You may be asked to stop blood thinners such as Coumadin, Plavix, Fragmin, Lovenox, etc., or any anti-inflammatories such as:  Aspirin, Ibuprofen, Advil, Naproxen prior to your surgery. We also ask that you stop any OTC medications such as fish oil, vitamin E, glucosamine, garlic, Multivitamins, COQ 10, etc.    We ask that you do not smoke 24 hours prior to surgery  We ask that you do not  drink any alcoholic beverages 24 hours prior to surgery     You must make arrangements for a responsible adult to take you home after your surgery. For your safety you will not be allowed to leave alone or drive yourself home. Your surgery will be cancelled if you do not have a ride home. Also for your safety, it is strongly suggested that someone stay with you the first 24 hours after your surgery. A parent or legal guardian must accompany a child scheduled for surgery and plan to stay at the hospital until the child is discharged. Please do not bring other children with you.     For your comfort, please wear simple loose fitting clothing to the hospital.  Please do not bring valuables. Do not wear any make-up or nail polish on your fingers or toes      For your safety, please do not wear any jewelry or body piercing's on the day of surgery. All jewelry must be removed. If you have dentures, they will be removed before going to operating room. For your convenience, we will provide you with a container. If you wear contact lenses or glasses, they will be removed, please bring a case for them. If you have a living will and a durable power of  for healthcare, please bring in a copy. As part of our patient safety program to minimize surgical site infections, we ask you to do the following:    · Please notify your surgeon if you develop any illness between         now and the  day of your surgery. · This includes a cough, cold, fever, sore throat, nausea,         or vomiting, and diarrhea, etc.  ·  Please notify your surgeon if you experience dizziness, shortness         of breath or blurred vision between now and the time of your surgery. Do not shave your operative site 96 hours prior to surgery. For face and neck surgery, men may use an electric razor 48 hours   prior to surgery. You may shower the night before surgery or the morning of   your surgery with an antibacterial soap. You will need to bring a photo ID and insurance card    [facility] has an onsite pharmacy, would you like to utilize our pharmacy     If you will be staying overnight and use a C-pap machine, please bring   your C-pap to hospital     Our goal is to provide you with excellent care, therefore, visitors will be limited to two(2) in the room at a time so that we may focus on providing this care for you. Please contact pre-admission testing if you have any further questions.                  [facility] phone number:  [phone] PAT fax number:  [phone]  Please note these are generalized instructions for all surgical cases, you may be provided with more specific instructions according to your surgery.

## 2020-11-25 NOTE — FLOWSHEET NOTE
Physical Therapy Daily Treatment Note  Date:  2020    Patient Name:  Suzanna Ocampo   \"SCOTT\"  :  1960  MRN: [id number]    Restrictions/Precautions:      Pertinent Medical History:      Medical/Treatment Diagnosis Information:  · Diagnosis: S/P Right TKR  · Treatment Diagnosis: Pain on right knee S/P TKR, limited ROM on right knee, weakness, altered gait    Insurance/Certification information:  PT Insurance Information: [employer] submitted 1:29 pm 20. Number [id number]  Physician Information:  Referring Practitioner: Dr. Vimal Moody of care signed (Y/N):  Sent to Dr. Asha Blanton on 20    Visit# / total visits:  2/3  Pain level:  3/10     G-Code (if applicable):      Date / Visit # G-Code Applied:  /   Thiago Das on eval = 48    Progress Note: []  Yes  [x]  No  Next due by: Visit #10      History of Injury: See below    Subjective:  Subjective  Subjective: Pt had progressing OA on Right knee. He is on his feet  all the time for work  and the [employer] out\". He had surgery to replace the  right knee on 20 and will have the left knee TKR on 20. Home therapy has done well. Celine Batres He is extremely motivated. 20  Pt states, \" doing well overall \"    Objective: Se eval   Observation:    Test measurements:        Exercises:  Exercise/Equipment Resistance/Repetitions Other comments   Nu step L3 x 6 min    Leg press 85# x 30, right 40# x 30    Quad sets 15X    Supine hip abduction 15X    Prone flex 30 x 3, 3 x 30 sec         TKE 30X    SLR 30X    Standing hip abd 30X    Minisquats 30X    Heel slides 10X          Step ups      wobble     incline 60 x 3 Add     Add               Other Therapeutic Activities:  Patient was educated on diagnosis, plan of care and prognosis of their complaint. Also, frequency and duration of treatments was discussed. Patient was informed of the attendance policy and issued a copy for their records.        Home Exercise Program: Patient was given written instructions for home exercises as above. Patient performs them correctly and understands purpose. Continue HEP as advised by home therapist.    Manual Treatments:    Retrograde massage to decrease edema. STM on right quad. Modalities:    Advised ice at home. Charges: Therapeutic Exercise:  [x] (69330) Provided verbal/tactile cueing for activities to restore or maintain strength, flexibility, endurance, ROM for improvements with self-care, mobility, lifting and ambulation. Neuromuscular Re-Education  [] (72715) Provided verbal/tactile cueing for activities to restore or maintain balance, coordination, kinesthetic sense, posture, motor skill, proprioception for self-care, mobility, lifting, and ambulation. Therapeutic Activities:    [] (01648) Provided verbal/tactile cueing to address functional limitations related to loss of mobility, strength, balance, and coordination. Gait Training:  [] (35922) Provided training and instruction to the patient for proper postural muscle recruitment and positioning with ambulation re-education     Home Exercise Program:    [x] (34363) Reviewed/Progressed HEP activities related to strengthening, flexibility, endurance, ROM for functional self-care, mobility, lifting and ambulation   [] (68303) Reviewed/Progressed HEP activities related to improving balance, coordination, kinesthetic sense, posture, motor skill, proprioception for self-care, mobility, lifting, and ambulation      Manual Treatments:  MFR / STM / Oscillations-Mobs:  G-I, II, III, IV / Manipulation / MLD  [x] (83437) Provided manual therapy to mobilize  soft tissue/joints/fluid for the purpose of modulating pain, promoting relaxation, increasing ROM, reducing/eliminating soft tissue swelling/inflammation/restriction, improving soft tissue extensibility and allowing for proper ROM for normal function with self- care, mobility, lifting and ambulation.         Timed Code Treatment Minutes: 39 Total Treatment Minutes: 45     [] EVAL (LOW) 92649   [] EVAL (MOD) 92666   [] EVAL (HIGH) 67744   [] RE-EVAL   [x] TE (20960) x  2     [] NMR (07302)   x    [x] Manual (05335) x 1   [] Ultrasound (25861) x  [] TA (62086) x  [] Mech Traction (96966)  [] Ionto (97554)           [] ES (un) (83154):   [] Other:      Treatment/Activity Tolerance:  [x] Patient tolerated treatment well [] Patient limited by fatigue  [] Patient limited by pain  [] Patient limited by other medical complications  [] Other:     Prognosis: [x] Good [] Fair  [] Poor    Goals:    Short term goals  Time Frame for Short term goals: 3 sessions  Short term goal 1: Pt will increase knee R ROM to 0-100  Short term goal 2: Pt will increase strength on right knee to normal functional strength  Short term goal 3: Pt will ambulate with less deviation in preparation for left TKR on 12/1/20        Patient goals : \"Dexterity and movement\"      Patient Requires Follow-up: [x] Yes  [] No    Plan:   [] Continue per plan of care [] Alter current plan (see comments)  [x] Plan of care initiated [] Hold pending MD visit [] Discharge    Plan for Next Session: See above. Pt will have left TKR on 12/1/20 and possibly manip under anes on right knee as needed.     Electronically signed by:  Flako Moffett PT,

## 2020-11-25 NOTE — PROGRESS NOTES
Preoperative Screening for Elective Surgery/Invasive Procedures While COVID-19 present in the community     Have you tested positive or have been told to self-isolate for COVID-19 like symptoms within the past 28 days? YES      Do you currently have any of the following symptoms? o Fever >100.0 F or 99.9 F in immunocompromised patients? n  o New onset cough, shortness of breath or difficulty breathing?n  o New onset sore throat, myalgia (muscle aches and pains), headache, loss of taste/smell or diarrhea? n   Have you had a potential exposure to COVID-19 within the past 14 days by:  o Close contact with a confirmed case? n  o Close contact with a healthcare worker,  or essential infrastructure worker (grocery store, restaurant, gas station, public utilities or transportation)? no  o Do you reside in a congregate setting such as; skilled nursing facility, adult home, correctional facility, homeless shelter or other institutional setting?no  o   o Have you had recent travel to a known COVID-19 hotspot? Resides in [facility] if the patient has a positive screen by answering yes to one or more of the above questions. Patients who test positive or screen positive prior to surgery or on the day of surgery should be evaluated in conjunction with the surgeon/proceduralist/anesthesiologist to determine the urgency of the procedure.

## 2020-11-25 NOTE — PROGRESS NOTES
PAT interview complete    [facility] Office [phone] called to add addendum to H/P for DOS-spoke to Ashu [place] will call pt if CNP will not clear pt for surgery    Pt tested postive for covid 11/4 but cleared for surgery on 12/1-no rapid dos    Pt states using neighbor's ice machine, told to call office for new ice pad    Nimisha at office aware.     Chart turned

## 2020-11-27 ENCOUNTER — HOSPITAL ENCOUNTER (OUTPATIENT)
Dept: CT IMAGING | Age: 60
Discharge: HOME | End: 2020-11-27
Payer: COMMERCIAL

## 2020-11-27 PROCEDURE — 73700 CT LOWER EXTREMITY W/O DYE: CPT

## 2020-11-27 NOTE — DISCHARGE INSTR - COC
[facility]) Continuity of Care Form    Patient Name:  Adrienne Newberry  : 1960    MRN:  [id number]    Admit date:  (Not on file)  Discharge date:  2020    Code Status Order: No Order  Advance Directives: Yes    Admitting Physician: Mily Villagomez MD  PCP: Nestor Garner, APRN - CNP    Discharging Nurse: [facility] Unit/Room#: No information available for this encounter. Discharging Unit Phone Number: [phone]    Emergency Contact:        Past Surgical History:  Past Surgical History:   Procedure Laterality Date    HERNIA REPAIR Right     inguinal hernia X 2    KNEE ARTHROSCOPY Left     KNEE ARTHROSCOPY Right     TOTAL KNEE ARTHROPLASTY Right 2020    ROBOTIC ASSISTED RIGHT TOTAL KNEE REPLACEMENT performed by Mily Villagomez MD at [place] [address]       Immunization History:   Immunization History   Administered Date(s) Administered    Tdap (Boostrix, Adacel) 2017       Active Problems:  Active Problems:    * No active hospital problems. *  Resolved Problems:    * No resolved hospital problems. *      Isolation/Infection:       Nurse Assessment:  Last Vital Signs:Ht 6' 2\" (1.88 m)   Wt 220 lb (99.8 kg)   BMI 28.25 kg/m²   Last documented pain score (0-10 scale):    Last Weight:   Wt Readings from Last 1 Encounters:   20 221 lb (100.2 kg)     Mental Status:  oriented and alert     IV Access:  - None    Nursing Mobility/ADLs:  Walking   Assisted  Transfer  Assisted  Bathing  Assisted  Dressing  Assisted  Toileting  Assisted  Feeding  Independent  Med Admin  Independent  Med Delivery   whole    Wound Care Documentation and Therapy:  Keep glued Prineo dressing clean and intact. DO NOT remove. Prineo is waterproof for showering. Doctor will evaluated dressing for removal at office visit 2 weeks after surgery        Elimination:  Urinary Catheter: None   Colostomy/Ileostomy: No  Continence:   · Bowel:  Yes  · Bladder: Yes  Date of Last BM: ***    Intake/Output Summary (Last 24 hours)   No intake or output data in the 24 hours ending 11/27/20 0954  Safety Concerns: At Risk for Falls    Impairments/Disabilities:      None    Nutrition Therapy:  Current Nutrition Therapy: No diet orders on file  Routes of Feeding: Oral  Liquids: No Restrictions  Daily Fluid Restriction: no  Last Modified Barium Swallow with Video (Video Swallowing Test): not done    Treatments at the Time of Hospital Discharge:   Respiratory Treatments:   Oxygen Therapy:  is not on home oxygen therapy. Ventilator:    - No ventilator support    Lab orders for discharge:        Rehab Therapies: Physical Therapy, Occupational Therapy and nursing care  Weight Bearing Status/Restrictions: No weight bearing restirctions  Other Medical Equipment (for information only, NOT a DME order):  Rolling walker  Other Treatments: ASA 81mg twice at day for 14 days for DVT prophylaxis , bilateral SAL hose for 2 weeks after surgery    Patient's personal belongings (please select all that are sent with patient):  None    RN SIGNATURE:  Electronically signed by Eric Merino RN on 12/1/20 at 3:32 PM EST    PHYSICIAN SECTION    Prognosis: Good    Condition at Discharge: Stable    Rehab Potential (if transferring to Rehab): Good    Physician Certification: I certify the above orders, information, and transfer of Larry Alvarado is necessary for the continuing treatment of the diagnosis listed and that he requires {Admit to Appropriate Level of Care:74102:::0} for {GREATER/LESS:508777928} 30 days.      Update Admission H&P: No change in H&P    PHYSICIAN SIGNATURE:  Electronically signed by Héctor Daniel 89, APRN - CNP on 11/30/20 at 9:54 AM RK/ Dr Romero Cure Status Date: ***    [facility] Readmission Risk Assessment Score:    Discharging to Facility/ Agency   · Name:   · Address:  · Phone:  · Fax:    Dialysis Facility (if applicable) · Name:  · Address:  · Dialysis Schedule:  · Phone:  · Fax:    / signature: {Esignature:[id number]:::0}          Followup with Dr Jesenia Mayer 2 weeks after surgery in office   [place] [address] and Sports Medicine, [facility] [address] [facility], [facility]   [address],   [phone]     DISCHARGE INSTRUCTIONS FOR TOTAL KNEE REPLACEMENT  Activity:   Elevate your leg if swelling occurs in your ankle. Use elastic wraps/hose until swelling decreases.  Continue the exercise program as prescribed by physical therapists.  Take frequent walks.  Use walker, crutches, or cane with weight bearing instructions as indicated by the physical therapists.  Take rest periods often. Elevate leg during rest period. Wound Care:   Cover the wound with a sterile gauze dressing and change daily as long as there is drainage.  Do not scrub wound. Pat it dry with a soft towel.  Dont apply any lotions or creams to your wound.  Check the incision every day for redness, swelling, or increase in drainage. Diet:   You can resume your normal diet. There are no limits on your diet due to your surgery.  Pain pills and activity changes may lead to constipation. To prevent this, use prune juice or bran cereals liberally. You may need to use a laxative such as Dulcolax, Senokot, or Milk of Magnesia.  Drink plenty of fluids. Medications:   Take pain pills if needed to maintain comfort.  Never drive while taking pain medicine.  Avoid over the counter medications until checking with your doctor.  Resume previous medications as instructed by your doctor. You will be on aspirin or Eliquis  for 14 days only. Stay off other anti-inflammatory medications (except Celebrex)  Call Your Doctor If:  Cara Garza You have increased pain not controlled by medications.  Excessive swelling in your ankle.  You develop numbness, tingling, or decreased movement.    You have a fever greater than 100 degrees for a day or over 101 degrees at any one time.  Your wound becomes more reddened, starts draining, or opens.  If you fall. You have any questions about your recovery. Inform your family doctor/dentist or any other doctor who cares for you in the future that you have a joint replacement. You may need antibiotics for dental or surgical procedures if there is any evidence of infection present. ? If you have required the use of insulin to control your blood sugar after surgery, follow up with your family doctor. ? Call your surgeons office to schedule your appointment to be seen after surgery. ? Make your appointment to continue physical therapy per doctors orders. ?  Smoking cessation assistance can be obtained from your family doctor or by calling [place] @ [phone]    _______________________________   _____   _______________________  ____                Patient Signature              Date      Witness                               Date

## 2020-11-27 NOTE — TELEPHONE ENCOUNTER
Attempted to call Pari Khan left VM to call [facility] office back at [phone]

## 2020-11-30 ENCOUNTER — HOSPITAL ENCOUNTER (OUTPATIENT)
Dept: PHYSICAL THERAPY | Age: 60
Setting detail: THERAPIES SERIES
Discharge: HOME | End: 2020-11-30
Payer: COMMERCIAL

## 2020-11-30 ENCOUNTER — ANESTHESIA EVENT (OUTPATIENT)
Dept: OPERATING ROOM | Age: 60
End: 2020-11-30
Payer: COMMERCIAL

## 2020-11-30 PROCEDURE — 97110 THERAPEUTIC EXERCISES: CPT

## 2020-11-30 PROCEDURE — 97140 MANUAL THERAPY 1/> REGIONS: CPT

## 2020-11-30 NOTE — FLOWSHEET NOTE
Physical Therapy Daily Treatment Note  Date:  2020    Patient Name:  Dank Merlos   \"SCOTT\"  :  1960  MRN: [id number]    Restrictions/Precautions:      Pertinent Medical History:      Medical/Treatment Diagnosis Information:  · Diagnosis: S/P Right TKR  · Treatment Diagnosis: Pain on right knee S/P TKR, limited ROM on right knee, weakness, altered gait    Insurance/Certification information:  PT Insurance Information: [employer]/OPTUM submitted 1:29 pm 20. Number [id number]  Physician Information:  Referring Practitioner: Dr. Jessie Kimbrough of care signed (Y/N):  Sent to Dr. Mohinder Vo on 20    Visit# / total visits:  3/3  Pain level:  3/10     G-Code (if applicable):      Date / Visit # G-Code Applied:  /   Irvin Spence on eval = 48    Progress Note: []  Yes  [x]  No  Next due by: Visit #10      History of Injury: See below    Subjective:  Subjective  Subjective: Pt had progressing OA on Right knee. He is on his feet  all the time for work  and the [employer] out\". He had surgery to replace the  right knee on 20 and will have the left knee TKR on 20. Home therapy has done well. Candida Douglas He is extremely motivated. 20  Pt states, \" doing well overall \"  20 Patient reports knee is doing ok just stiffness. Objective: Se eval   Observation:    Test measurements: 20  Right knee PROM 5-108      Exercises:  Exercise/Equipment Resistance/Repetitions Other comments   Nu step L3 x 6 min    Leg press 85# x 30, right 40# x 30    Quad sets 15X    Supine hip abduction 15X    Prone flex 30 x 3, 3 x 30 sec    Step flex  30 sec x 3     TKE 30X    SLR 30X    Standing hip abd 30X    Minisquats     Heel slides 10X          Step ups      wobble     incline 60 x 3 Add     Add               Other Therapeutic Activities:  Patient was educated on diagnosis, plan of care and prognosis of their complaint. Also, frequency and duration of treatments was discussed.  Patient was informed of the attendance policy and issued a copy for their records. Home Exercise Program: Patient was given written instructions for home exercises as above. Patient performs them correctly and understands purpose. Continue HEP as advised by home therapist.    Manual Treatments:    Retrograde massage to decrease edema. STM on right quad. Modalities:    Advised ice at home. Charges: Therapeutic Exercise:  [x] (45654) Provided verbal/tactile cueing for activities to restore or maintain strength, flexibility, endurance, ROM for improvements with self-care, mobility, lifting and ambulation. Neuromuscular Re-Education  [] (28040) Provided verbal/tactile cueing for activities to restore or maintain balance, coordination, kinesthetic sense, posture, motor skill, proprioception for self-care, mobility, lifting, and ambulation. Therapeutic Activities:    [] (45287) Provided verbal/tactile cueing to address functional limitations related to loss of mobility, strength, balance, and coordination.      Gait Training:  [] (00198) Provided training and instruction to the patient for proper postural muscle recruitment and positioning with ambulation re-education     Home Exercise Program:    [x] (14322) Reviewed/Progressed HEP activities related to strengthening, flexibility, endurance, ROM for functional self-care, mobility, lifting and ambulation   [] (64625) Reviewed/Progressed HEP activities related to improving balance, coordination, kinesthetic sense, posture, motor skill, proprioception for self-care, mobility, lifting, and ambulation      Manual Treatments:  MFR / STM / Oscillations-Mobs:  G-I, II, III, IV / Manipulation / MLD  [x] (08984) Provided manual therapy to mobilize  soft tissue/joints/fluid for the purpose of modulating pain, promoting relaxation, increasing ROM, reducing/eliminating soft tissue swelling/inflammation/restriction, improving soft tissue extensibility and allowing for proper ROM for normal function with self- care, mobility, lifting and ambulation. Timed Code Treatment Minutes: 45   Total Treatment Minutes: 45     [] EVAL (LOW) 96518   [] EVAL (MOD) 41244   [] EVAL (HIGH) 60074   [] RE-EVAL   [x] TE (05207) x  2     [] NMR (36141)   x    [x] Manual (19925) x 1   [] Ultrasound (79200) x  [] TA (53537) x  [] Mech Traction (91884)  [] Ionto (02385)           [] ES (un) (89143):   [] Other:      Treatment/Activity Tolerance:  [x] Patient tolerated treatment well [] Patient limited by fatigue  [] Patient limited by pain  [] Patient limited by other medical complications  [] Other:     Prognosis: [x] Good [] Fair  [] Poor    Goals:    Short term goals  Time Frame for Short term goals: 3 sessions  Short term goal 1: Pt will increase knee R ROM to 0-100  Short term goal 2: Pt will increase strength on right knee to normal functional strength  Short term goal 3: Pt will ambulate with less deviation in preparation for left TKR on 12/1/20        Patient goals : \"Dexterity and movement\"      Patient Requires Follow-up: [x] Yes  [] No    Plan:   [] Continue per plan of care [] Alter current plan (see comments)  [x] Plan of care initiated [] Hold pending MD visit [] Discharge    Plan for Next Session: See above. Pt will have left TKR on 12/1/20 and possibly manip under anes on right knee as needed.     Electronically signed by:  Esther Mena PTA,

## 2020-11-30 NOTE — PROGRESS NOTES
Spoke with pt, no one from office- PCP notified him that he had to come in today.   Nimisha notified and asked to call pt.back

## 2020-12-01 ENCOUNTER — ANESTHESIA (OUTPATIENT)
Dept: OPERATING ROOM | Age: 60
End: 2020-12-01
Payer: COMMERCIAL

## 2020-12-01 ENCOUNTER — HOSPITAL ENCOUNTER (OUTPATIENT)
Age: 60
Setting detail: SURGERY ADMIT
Discharge: HOME | End: 2020-12-01
Attending: ORTHOPAEDIC SURGERY | Admitting: ORTHOPAEDIC SURGERY
Payer: COMMERCIAL

## 2020-12-01 ENCOUNTER — APPOINTMENT (OUTPATIENT)
Dept: GENERAL RADIOLOGY | Age: 60
End: 2020-12-01
Attending: ORTHOPAEDIC SURGERY
Payer: COMMERCIAL

## 2020-12-01 VITALS
SYSTOLIC BLOOD PRESSURE: 122 MMHG | TEMPERATURE: 97 F | WEIGHT: 220 LBS | RESPIRATION RATE: 18 BRPM | HEART RATE: 62 BPM | DIASTOLIC BLOOD PRESSURE: 75 MMHG | OXYGEN SATURATION: 93 % | BODY MASS INDEX: 28.23 KG/M2 | HEIGHT: 74 IN

## 2020-12-01 VITALS — OXYGEN SATURATION: 97 % | SYSTOLIC BLOOD PRESSURE: 107 MMHG | TEMPERATURE: 98.6 F | DIASTOLIC BLOOD PRESSURE: 66 MMHG

## 2020-12-01 PROBLEM — M17.12 ARTHRITIS OF LEFT KNEE: Status: ACTIVE | Noted: 2020-12-01

## 2020-12-01 LAB
ABO/RH: NORMAL
ANTIBODY SCREEN: NORMAL
GLUCOSE BLD-MCNC: 98 MG/DL (ref 70–99)
PERFORMED ON: NORMAL

## 2020-12-01 PROCEDURE — 6370000000 HC RX 637 (ALT 250 FOR IP): Performed by: ORTHOPAEDIC SURGERY

## 2020-12-01 PROCEDURE — 2580000003 HC RX 258: Performed by: ORTHOPAEDIC SURGERY

## 2020-12-01 PROCEDURE — 2720000010 HC SURG SUPPLY STERILE: Performed by: ORTHOPAEDIC SURGERY

## 2020-12-01 PROCEDURE — 3600000005 HC SURGERY LEVEL 5 BASE: Performed by: ORTHOPAEDIC SURGERY

## 2020-12-01 PROCEDURE — 86900 BLOOD TYPING SEROLOGIC ABO: CPT

## 2020-12-01 PROCEDURE — 7100000010 HC PHASE II RECOVERY - FIRST 15 MIN: Performed by: ORTHOPAEDIC SURGERY

## 2020-12-01 PROCEDURE — 2500000003 HC RX 250 WO HCPCS: Performed by: NURSE ANESTHETIST, CERTIFIED REGISTERED

## 2020-12-01 PROCEDURE — 7100000001 HC PACU RECOVERY - ADDTL 15 MIN: Performed by: ORTHOPAEDIC SURGERY

## 2020-12-01 PROCEDURE — 6360000002 HC RX W HCPCS: Performed by: ANESTHESIOLOGY

## 2020-12-01 PROCEDURE — 6360000002 HC RX W HCPCS: Performed by: ORTHOPAEDIC SURGERY

## 2020-12-01 PROCEDURE — 2580000003 HC RX 258: Performed by: ANESTHESIOLOGY

## 2020-12-01 PROCEDURE — 6360000002 HC RX W HCPCS: Performed by: NURSE ANESTHETIST, CERTIFIED REGISTERED

## 2020-12-01 PROCEDURE — C9290 INJ, BUPIVACAINE LIPOSOME: HCPCS | Performed by: ORTHOPAEDIC SURGERY

## 2020-12-01 PROCEDURE — 2709999900 HC NON-CHARGEABLE SUPPLY: Performed by: ORTHOPAEDIC SURGERY

## 2020-12-01 PROCEDURE — 7100000011 HC PHASE II RECOVERY - ADDTL 15 MIN: Performed by: ORTHOPAEDIC SURGERY

## 2020-12-01 PROCEDURE — 3700000001 HC ADD 15 MINUTES (ANESTHESIA): Performed by: ORTHOPAEDIC SURGERY

## 2020-12-01 PROCEDURE — 2500000003 HC RX 250 WO HCPCS: Performed by: NURSE PRACTITIONER

## 2020-12-01 PROCEDURE — 3600000015 HC SURGERY LEVEL 5 ADDTL 15MIN: Performed by: ORTHOPAEDIC SURGERY

## 2020-12-01 PROCEDURE — 86850 RBC ANTIBODY SCREEN: CPT

## 2020-12-01 PROCEDURE — 2580000003 HC RX 258: Performed by: NURSE PRACTITIONER

## 2020-12-01 PROCEDURE — 3700000000 HC ANESTHESIA ATTENDED CARE: Performed by: ORTHOPAEDIC SURGERY

## 2020-12-01 PROCEDURE — C1713 ANCHOR/SCREW BN/BN,TIS/BN: HCPCS | Performed by: ORTHOPAEDIC SURGERY

## 2020-12-01 PROCEDURE — 73560 X-RAY EXAM OF KNEE 1 OR 2: CPT

## 2020-12-01 PROCEDURE — 6370000000 HC RX 637 (ALT 250 FOR IP): Performed by: NURSE PRACTITIONER

## 2020-12-01 PROCEDURE — 2500000003 HC RX 250 WO HCPCS: Performed by: ORTHOPAEDIC SURGERY

## 2020-12-01 PROCEDURE — 86901 BLOOD TYPING SEROLOGIC RH(D): CPT

## 2020-12-01 PROCEDURE — 88311 DECALCIFY TISSUE: CPT

## 2020-12-01 PROCEDURE — 6360000002 HC RX W HCPCS: Performed by: NURSE PRACTITIONER

## 2020-12-01 PROCEDURE — 7100000000 HC PACU RECOVERY - FIRST 15 MIN: Performed by: ORTHOPAEDIC SURGERY

## 2020-12-01 PROCEDURE — 88305 TISSUE EXAM BY PATHOLOGIST: CPT

## 2020-12-01 PROCEDURE — C1776 JOINT DEVICE (IMPLANTABLE): HCPCS | Performed by: ORTHOPAEDIC SURGERY

## 2020-12-01 DEVICE — INSERT TIB BEAR SZ 7 THK9MM KNEE X3 CNDYL STABILIZING: Type: IMPLANTABLE DEVICE | Site: KNEE | Status: FUNCTIONAL

## 2020-12-01 DEVICE — COMPONENT PAT SZ S39 OD39MM THK11MM TRITANIUM MTL BK KNEE: Type: IMPLANTABLE DEVICE | Site: KNEE | Status: FUNCTIONAL

## 2020-12-01 DEVICE — BASEPLATE TIB SZ 7 AP56MM ML80MM KNEE TRITANIUM 4 CRUCFRM: Type: IMPLANTABLE DEVICE | Site: KNEE | Status: FUNCTIONAL

## 2020-12-01 DEVICE — IMPLANTABLE DEVICE: Type: IMPLANTABLE DEVICE | Site: KNEE | Status: FUNCTIONAL

## 2020-12-01 RX ORDER — FENTANYL CITRATE 50 UG/ML
INJECTION, SOLUTION INTRAMUSCULAR; INTRAVENOUS PRN
Status: DISCONTINUED | OUTPATIENT
Start: 2020-12-01 | End: 2020-12-01 | Stop reason: SDUPTHER

## 2020-12-01 RX ORDER — OXYCODONE HYDROCHLORIDE AND ACETAMINOPHEN 5; 325 MG/1; MG/1
2 TABLET ORAL PRN
Status: DISCONTINUED | OUTPATIENT
Start: 2020-12-01 | End: 2020-12-01 | Stop reason: HOSPADM

## 2020-12-01 RX ORDER — FENTANYL CITRATE 50 UG/ML
25 INJECTION, SOLUTION INTRAMUSCULAR; INTRAVENOUS EVERY 5 MIN PRN
Status: DISCONTINUED | OUTPATIENT
Start: 2020-12-01 | End: 2020-12-01 | Stop reason: HOSPADM

## 2020-12-01 RX ORDER — LIDOCAINE HYDROCHLORIDE 20 MG/ML
INJECTION, SOLUTION EPIDURAL; INFILTRATION; INTRACAUDAL; PERINEURAL PRN
Status: DISCONTINUED | OUTPATIENT
Start: 2020-12-01 | End: 2020-12-01 | Stop reason: SDUPTHER

## 2020-12-01 RX ORDER — PROPOFOL 10 MG/ML
INJECTION, EMULSION INTRAVENOUS CONTINUOUS PRN
Status: DISCONTINUED | OUTPATIENT
Start: 2020-12-01 | End: 2020-12-01 | Stop reason: SDUPTHER

## 2020-12-01 RX ORDER — SODIUM CHLORIDE 0.9 % (FLUSH) 0.9 %
10 SYRINGE (ML) INJECTION EVERY 12 HOURS SCHEDULED
Status: DISCONTINUED | OUTPATIENT
Start: 2020-12-01 | End: 2020-12-01 | Stop reason: HOSPADM

## 2020-12-01 RX ORDER — SODIUM CHLORIDE 0.9 % (FLUSH) 0.9 %
10 SYRINGE (ML) INJECTION PRN
Status: DISCONTINUED | OUTPATIENT
Start: 2020-12-01 | End: 2020-12-01 | Stop reason: HOSPADM

## 2020-12-01 RX ORDER — OXYCODONE HYDROCHLORIDE 10 MG/1
10 TABLET ORAL ONCE
Status: COMPLETED | OUTPATIENT
Start: 2020-12-01 | End: 2020-12-01

## 2020-12-01 RX ORDER — OXYCODONE HYDROCHLORIDE 10 MG/1
10 TABLET ORAL EVERY 4 HOURS PRN
Status: DISCONTINUED | OUTPATIENT
Start: 2020-12-01 | End: 2020-12-01 | Stop reason: HOSPADM

## 2020-12-01 RX ORDER — PROPOFOL 10 MG/ML
INJECTION, EMULSION INTRAVENOUS PRN
Status: DISCONTINUED | OUTPATIENT
Start: 2020-12-01 | End: 2020-12-01 | Stop reason: SDUPTHER

## 2020-12-01 RX ORDER — CELECOXIB 200 MG/1
200 CAPSULE ORAL ONCE
Status: COMPLETED | OUTPATIENT
Start: 2020-12-01 | End: 2020-12-01

## 2020-12-01 RX ORDER — SODIUM CHLORIDE 9 MG/ML
INJECTION, SOLUTION INTRAVENOUS CONTINUOUS
Status: DISCONTINUED | OUTPATIENT
Start: 2020-12-01 | End: 2020-12-01 | Stop reason: HOSPADM

## 2020-12-01 RX ORDER — BUPIVACAINE HYDROCHLORIDE 7.5 MG/ML
INJECTION, SOLUTION INTRASPINAL PRN
Status: DISCONTINUED | OUTPATIENT
Start: 2020-12-01 | End: 2020-12-01 | Stop reason: SDUPTHER

## 2020-12-01 RX ORDER — ACETAMINOPHEN 500 MG
1000 TABLET ORAL ONCE
Status: COMPLETED | OUTPATIENT
Start: 2020-12-01 | End: 2020-12-01

## 2020-12-01 RX ORDER — ONDANSETRON 4 MG/1
4 TABLET, ORALLY DISINTEGRATING ORAL EVERY 8 HOURS PRN
Status: DISCONTINUED | OUTPATIENT
Start: 2020-12-01 | End: 2020-12-01 | Stop reason: HOSPADM

## 2020-12-01 RX ORDER — VANCOMYCIN HYDROCHLORIDE 1 G/20ML
INJECTION, POWDER, LYOPHILIZED, FOR SOLUTION INTRAVENOUS
Status: COMPLETED | OUTPATIENT
Start: 2020-12-01 | End: 2020-12-01

## 2020-12-01 RX ORDER — ACETAMINOPHEN 325 MG/1
650 TABLET ORAL EVERY 6 HOURS
Status: DISCONTINUED | OUTPATIENT
Start: 2020-12-01 | End: 2020-12-01

## 2020-12-01 RX ORDER — OXYCODONE HYDROCHLORIDE 5 MG/1
5 TABLET ORAL EVERY 4 HOURS PRN
Status: DISCONTINUED | OUTPATIENT
Start: 2020-12-01 | End: 2020-12-01 | Stop reason: SDUPTHER

## 2020-12-01 RX ORDER — CEPHALEXIN 500 MG/1
500 CAPSULE ORAL SEE ADMIN INSTRUCTIONS
Qty: 2 CAPSULE | Refills: 0 | Status: SHIPPED | OUTPATIENT
Start: 2020-12-01 | End: 2021-01-08

## 2020-12-01 RX ORDER — MIDAZOLAM HYDROCHLORIDE 1 MG/ML
INJECTION INTRAMUSCULAR; INTRAVENOUS PRN
Status: DISCONTINUED | OUTPATIENT
Start: 2020-12-01 | End: 2020-12-01 | Stop reason: SDUPTHER

## 2020-12-01 RX ORDER — ONDANSETRON 2 MG/ML
4 INJECTION INTRAMUSCULAR; INTRAVENOUS EVERY 6 HOURS PRN
Status: DISCONTINUED | OUTPATIENT
Start: 2020-12-01 | End: 2020-12-01 | Stop reason: HOSPADM

## 2020-12-01 RX ORDER — ASPIRIN 81 MG/1
81 TABLET ORAL 2 TIMES DAILY
Qty: 28 TABLET | Refills: 0 | Status: SHIPPED | OUTPATIENT
Start: 2020-12-01 | End: 2021-01-08

## 2020-12-01 RX ORDER — SODIUM CHLORIDE 450 MG/100ML
INJECTION, SOLUTION INTRAVENOUS CONTINUOUS
Status: DISCONTINUED | OUTPATIENT
Start: 2020-12-01 | End: 2020-12-01 | Stop reason: HOSPADM

## 2020-12-01 RX ORDER — OXYCODONE HYDROCHLORIDE AND ACETAMINOPHEN 5; 325 MG/1; MG/1
1 TABLET ORAL PRN
Status: DISCONTINUED | OUTPATIENT
Start: 2020-12-01 | End: 2020-12-01 | Stop reason: HOSPADM

## 2020-12-01 RX ORDER — OXYCODONE HYDROCHLORIDE 5 MG/1
5-10 TABLET ORAL EVERY 6 HOURS PRN
Qty: 40 TABLET | Refills: 0 | Status: SHIPPED | OUTPATIENT
Start: 2020-12-01 | End: 2020-12-10 | Stop reason: SDUPTHER

## 2020-12-01 RX ORDER — OXYCODONE HYDROCHLORIDE 5 MG/1
5 TABLET ORAL EVERY 4 HOURS PRN
Status: DISCONTINUED | OUTPATIENT
Start: 2020-12-01 | End: 2020-12-01 | Stop reason: HOSPADM

## 2020-12-01 RX ORDER — ONDANSETRON 2 MG/ML
4 INJECTION INTRAMUSCULAR; INTRAVENOUS
Status: DISCONTINUED | OUTPATIENT
Start: 2020-12-01 | End: 2020-12-01 | Stop reason: HOSPADM

## 2020-12-01 RX ORDER — TRANEXAMIC ACID 100 MG/ML
INJECTION, SOLUTION INTRAVENOUS
Status: COMPLETED | OUTPATIENT
Start: 2020-12-01 | End: 2020-12-01

## 2020-12-01 RX ORDER — ACETAMINOPHEN 325 MG/1
650 TABLET ORAL EVERY 6 HOURS
Status: DISCONTINUED | OUTPATIENT
Start: 2020-12-01 | End: 2020-12-01 | Stop reason: HOSPADM

## 2020-12-01 RX ORDER — ONDANSETRON 2 MG/ML
INJECTION INTRAMUSCULAR; INTRAVENOUS PRN
Status: DISCONTINUED | OUTPATIENT
Start: 2020-12-01 | End: 2020-12-01 | Stop reason: SDUPTHER

## 2020-12-01 RX ADMIN — ONDANSETRON 4 MG: 2 INJECTION INTRAMUSCULAR; INTRAVENOUS at 10:16

## 2020-12-01 RX ADMIN — HYDROMORPHONE HYDROCHLORIDE 0.5 MG: 1 INJECTION, SOLUTION INTRAMUSCULAR; INTRAVENOUS; SUBCUTANEOUS at 10:56

## 2020-12-01 RX ADMIN — CEFAZOLIN SODIUM 2 G: 10 INJECTION, POWDER, FOR SOLUTION INTRAVENOUS at 08:55

## 2020-12-01 RX ADMIN — CEFAZOLIN SODIUM 2 G: 10 INJECTION, POWDER, FOR SOLUTION INTRAVENOUS at 14:23

## 2020-12-01 RX ADMIN — OXYCODONE HYDROCHLORIDE 10 MG: 10 TABLET ORAL at 07:07

## 2020-12-01 RX ADMIN — SODIUM CHLORIDE: 9 INJECTION, SOLUTION INTRAVENOUS at 07:16

## 2020-12-01 RX ADMIN — OXYCODONE HYDROCHLORIDE 10 MG: 10 TABLET ORAL at 14:22

## 2020-12-01 RX ADMIN — ACETAMINOPHEN 1000 MG: 500 TABLET ORAL at 14:11

## 2020-12-01 RX ADMIN — FENTANYL CITRATE 100 MCG: 50 INJECTION INTRAMUSCULAR; INTRAVENOUS at 08:55

## 2020-12-01 RX ADMIN — LIDOCAINE HYDROCHLORIDE 50 MG: 20 INJECTION, SOLUTION EPIDURAL; INFILTRATION; INTRACAUDAL; PERINEURAL at 09:06

## 2020-12-01 RX ADMIN — HYDROMORPHONE HYDROCHLORIDE 1 MG: 1 INJECTION, SOLUTION INTRAMUSCULAR; INTRAVENOUS; SUBCUTANEOUS at 10:28

## 2020-12-01 RX ADMIN — BUPIVACAINE HYDROCHLORIDE IN DEXTROSE 1.6 ML: 7.5 INJECTION, SOLUTION SUBARACHNOID at 09:00

## 2020-12-01 RX ADMIN — PROPOFOL 140 MCG/KG/MIN: 10 INJECTION, EMULSION INTRAVENOUS at 09:06

## 2020-12-01 RX ADMIN — TRANEXAMIC ACID 1000 MG: 100 INJECTION, SOLUTION INTRAVENOUS at 15:12

## 2020-12-01 RX ADMIN — HYDROMORPHONE HYDROCHLORIDE 0.5 MG: 1 INJECTION, SOLUTION INTRAMUSCULAR; INTRAVENOUS; SUBCUTANEOUS at 11:09

## 2020-12-01 RX ADMIN — MIDAZOLAM 2 MG: 1 INJECTION INTRAMUSCULAR; INTRAVENOUS at 08:55

## 2020-12-01 RX ADMIN — CELECOXIB 200 MG: 200 CAPSULE ORAL at 07:07

## 2020-12-01 RX ADMIN — PROPOFOL 50 MG: 10 INJECTION, EMULSION INTRAVENOUS at 09:06

## 2020-12-01 ASSESSMENT — PAIN DESCRIPTION - DESCRIPTORS
DESCRIPTORS: ACHING

## 2020-12-01 ASSESSMENT — PULMONARY FUNCTION TESTS
PIF_VALUE: 1
PIF_VALUE: 0
PIF_VALUE: 1
PIF_VALUE: 0
PIF_VALUE: 1
PIF_VALUE: 0
PIF_VALUE: 1
PIF_VALUE: 0
PIF_VALUE: 1
PIF_VALUE: 1
PIF_VALUE: 0
PIF_VALUE: 0
PIF_VALUE: 1
PIF_VALUE: 0
PIF_VALUE: 1
PIF_VALUE: 0
PIF_VALUE: 1
PIF_VALUE: 0
PIF_VALUE: 1

## 2020-12-01 ASSESSMENT — PAIN DESCRIPTION - ORIENTATION
ORIENTATION: LEFT

## 2020-12-01 ASSESSMENT — PAIN SCALES - GENERAL
PAINLEVEL_OUTOF10: 7
PAINLEVEL_OUTOF10: 7
PAINLEVEL_OUTOF10: 6
PAINLEVEL_OUTOF10: 6
PAINLEVEL_OUTOF10: 0
PAINLEVEL_OUTOF10: 7
PAINLEVEL_OUTOF10: 6
PAINLEVEL_OUTOF10: 8

## 2020-12-01 ASSESSMENT — PAIN DESCRIPTION - ONSET
ONSET: ON-GOING

## 2020-12-01 ASSESSMENT — PAIN DESCRIPTION - PROGRESSION
CLINICAL_PROGRESSION: NOT CHANGED
CLINICAL_PROGRESSION: GRADUALLY WORSENING
CLINICAL_PROGRESSION: GRADUALLY IMPROVING
CLINICAL_PROGRESSION: GRADUALLY IMPROVING

## 2020-12-01 ASSESSMENT — PAIN - FUNCTIONAL ASSESSMENT
PAIN_FUNCTIONAL_ASSESSMENT: ACTIVITIES ARE NOT PREVENTED
PAIN_FUNCTIONAL_ASSESSMENT: PREVENTS OR INTERFERES SOME ACTIVE ACTIVITIES AND ADLS
PAIN_FUNCTIONAL_ASSESSMENT: 0-10

## 2020-12-01 ASSESSMENT — PAIN DESCRIPTION - LOCATION
LOCATION: KNEE

## 2020-12-01 ASSESSMENT — PAIN DESCRIPTION - PAIN TYPE
TYPE: SURGICAL PAIN

## 2020-12-01 ASSESSMENT — PAIN DESCRIPTION - FREQUENCY
FREQUENCY: CONTINUOUS

## 2020-12-01 ASSESSMENT — LIFESTYLE VARIABLES: SMOKING_STATUS: 1

## 2020-12-01 ASSESSMENT — ENCOUNTER SYMPTOMS: SHORTNESS OF BREATH: 0

## 2020-12-01 NOTE — PROGRESS NOTES
Physical Therapy      Outpatient Physical Therapy  [] [facility]    Phone: [phone]   Fax: [phone]   [x] [facility]  Phone: [phone]   Fax: [phone]  [] [facility]              Phone: [phone]   Fax: [phone]     Physical Therapy Progress/Discharge Note  Date: 2020        Patient Name:  Sree Dumas    :  1960  MRN: [id number]  Restrictions/Precautions:    · Diagnosis:  S/P Right TKR  · Treatment Diagnosis: Pain on right knee S/P TKR, limited ROM on right knee, weakness, altered gait     Insurance/Certification information:  PT Insurance Information: [employer]/OPTUM submitted 1:29 pm 20. Number [id number]  Physician Information:  Referring Practitioner: Dr. Liberty Tran of care signed (Y/N):  Sent to Dr. Mcginnis Doing on 20     Visit# / total visits:  3/3  Pain level:       3/10        Time Period for Report:  20-20  Cancels/No-shows to date: 0     Plan of Care/Treatment to date:  [x] Therapeutic Exercise    [] Modalities:  [x] Therapeutic Activity     [] Ultrasound  [] Electrical Stimulation  [x] Gait Training      [] Cervical Traction    [] Lumbar Traction  [] Neuromuscular Re-education  [] Cold/hotpack [] Iontophoresis  [x] Instruction in HEP      Other:  [x] Manual Therapy       []    [] Aquatic Therapy       []                          Significant Findings At Last Visit/Comments:    Subjective:      Pt had progressing OA on Right knee. He is on his feet  all the time for work  and the [employer] out\". He had surgery to replace the  right knee on 20 and will have the left knee TKR on 20. Home therapy has done well. [place] St[place] He is extremely motivated.   20  Pt states, \" doing well overall \"  20 Patient reports knee is doing ok just stiffness.     Objective: See eval from 20  · Observation:   · Test measurements: 20  Right knee PROM 5-108            Assessment:   Summary: Pt came three times for PT post op right knee TKR and will have the left TKR on 12/1/20. He is highly motivated and plans to return to work in January, 2021. He mentioned that he may have a  manipulation under anesthesia on the right knee at the same time as he is under anesthesia for the left knee replacement. He will have home PT and then return to out patient therapy later in December. Progression Towards Functional goals:  [] Patient is progressing as expected towards functional goals listed. [] Progression is slowed due to complexities listed. [] Progression has been slowed due to co-morbidities. [x] Plan just implemented, too soon to assess goals progression  [] Other:    Goals:   Short term goals  Time Frame for Short term goals: 3 sessions  Short term goal 1: Pt will increase knee R ROM to 0-100/MET  Short term goal 2: Pt will increase strength on right knee to normal functional strength/Partially MET  Short term goal 3: Pt will ambulate with less deviation in preparation for left TKR on 12/1/20 Kenyetta Sanders MET       Patient goals : \"Dexterity and movement\"/Partially MET    Rehab Potential: [] Excellent [x] Good [] Fair  [] Poor     Goal Status:  [] Achieved [x] Partially Achieved  [] Not Achieved     Current Frequency/Duration:  # Days per week: [x] 1 day # Weeks: [] 1 week [] 4 weeks      [x] 2 days   [x] 2 weeks [] 5 weeks      [] 3 days   [] 3 weeks [] 6 weeks     Patient Status: [] Continue per initial plan of Care     [x] Patient now discharged - \"on hold\" until released for out pt PT after left TKR   [] Additional visits requested, Please re-certify for additional visits:      Requested frequency/duration:  X/week for weeks    Electronically signed by:  Aj Hdz PT    If you have any questions or concerns, please don't hesitate to call.   Thank you for your referral.    Physician Signature:________________________________Date:__________________  By signing above, therapists plan is approved by physician

## 2020-12-01 NOTE — PROGRESS NOTES
Pt having some pain in left knee, treated with PRNs as ordered. Placed back on 2L NC after dilaudid.

## 2020-12-01 NOTE — ANESTHESIA PRE PROCEDURE
Department of Anesthesiology  Preprocedure Note       Name:  Oskar Credit   Age:  61 y.o.  :  1960                                          MRN:  [id number]         Date:  2020      Surgeon: Sakshi Bergeron):  Sejal Sanabria MD    Procedure: Procedure(s):  LEFT TOTAL KNEE REPLACEMENT ROBOTIC ASSISTED, AND MANIPULATION RIGHT KNEE    Medications prior to admission:   Prior to Admission medications    Medication Sig Start Date End Date Taking? Authorizing Provider   cephALEXin (KEFLEX) 500 MG capsule Take 1 capsule by mouth See Admin Instructions Take one capsule at 9pm today and one capsule at 9am tomorrow 20   LISHA Mcfarlane - CNP   Multiple Vitamins-Minerals (MULTIVITAMIN PO) Take 1 tablet by mouth daily. Historical Provider, MD       Current medications:    Current Facility-Administered Medications   Medication Dose Route Frequency Provider Last Rate Last Dose    0.9 % sodium chloride infusion   Intravenous Continuous Ashvin Rowan MD 75 mL/hr at 20 0716      sodium chloride flush 0.9 % injection 10 mL  10 mL Intravenous 2 times per day Ashvin Rowan MD        sodium chloride flush 0.9 % injection 10 mL  10 mL Intravenous PRN Ashvin Rowan MD        ceFAZolin (ANCEF) 2 g in dextrose 5 % 100 mL IVPB  2 g Intravenous Once Sejal Sanabria MD           Allergies:  No Known Allergies    Problem List:  There is no problem list on file for this patient.       Past Medical History:        Diagnosis Date    Arthritis     Lab test positive for detection of COVID-19 virus     2020       Past Surgical History:        Procedure Laterality Date    HERNIA REPAIR Right     inguinal hernia X 2    KNEE ARTHROSCOPY Left     KNEE ARTHROSCOPY Right     TOTAL KNEE ARTHROPLASTY Right 2020    ROBOTIC ASSISTED RIGHT TOTAL KNEE REPLACEMENT performed by Sejal Sanabria MD at [address] [place] [address] History:    Social History     Tobacco Use    Smoking status: Current Some Day Smoker     Packs/day: 0.00     Years: 15.00     Pack years: 0.00     Types: Cigars    Smokeless tobacco: Never Used    Tobacco comment: rare   Substance Use Topics    Alcohol use: Yes     Comment: Occ                                 Ready to quit: Not Answered  Counseling given: Not Answered  Comment: rare      Vital Signs (Current):   Vitals:    11/25/20 1238 12/01/20 0650 12/01/20 0658   BP:   120/80   Pulse:   70   Resp:   17   Temp:   97.8 °F (36.6 °C)   TempSrc:   Temporal   SpO2:   96%   Weight: 220 lb (99.8 kg) 220 lb (99.8 kg)    Height: 6' 2\" (1.88 m) 6' 2\" (1.88 m)                                               BP Readings from Last 3 Encounters:   12/01/20 120/80   11/04/20 (!) 103/55   11/04/20 (!) 140/83       NPO Status: Time of last liquid consumption: 0600                        Time of last solid consumption: 2300                        Date of last liquid consumption: 12/01/20                        Date of last solid food consumption: 11/30/20    BMI:   Wt Readings from Last 3 Encounters:   12/01/20 220 lb (99.8 kg)   11/19/20 221 lb (100.2 kg)   11/04/20 221 lb 3.7 oz (100.4 kg)     Body mass index is 28.25 kg/m².     CBC:   Lab Results   Component Value Date    WBC 5.7 11/20/2020    RBC 4.38 11/20/2020    HGB 13.2 11/20/2020    HCT 39.2 11/20/2020    MCV 89.5 11/20/2020    RDW 13.1 11/20/2020     11/20/2020       CMP:   Lab Results   Component Value Date     11/20/2020    K 4.2 11/20/2020    K 4.6 06/13/2020     11/20/2020    CO2 25 11/20/2020    BUN 18 11/20/2020    CREATININE 0.8 11/20/2020    GFRAA >60 11/20/2020    AGRATIO 1.8 06/13/2020    LABGLOM >60 11/20/2020    LABGLOM 70.9 06/24/2011    GLUCOSE 93 11/20/2020    GLUCOSE 89 06/24/2011    PROT 6.5 06/13/2020    PROT 6.5 06/24/2011    CALCIUM 9.0 11/20/2020    BILITOT 0.4 06/13/2020    ALKPHOS 52 06/13/2020    AST 15 06/13/2020    ALT 13 06/13/2020       POC Tests: No results for input(s): POCGLU, POCNA, POCK, POCCL, POCBUN, POCHEMO, POCHCT in the last 72 hours. Coags:   Lab Results   Component Value Date    PROTIME 12.5 11/20/2020    INR 1.08 11/20/2020    APTT 32.2 11/20/2020       HCG (If Applicable): No results found for: PREGTESTUR, PREGSERUM, HCG, HCGQUANT     ABGs: No results found for: PHART, PO2ART, JKG6ZMX, RTE0RCQ, BEART, A4SJIPVQ     Type & Screen (If Applicable):  No results found for: LABABO, LABRH    Drug/Infectious Status (If Applicable):  No results found for: HIV, HEPCAB    COVID-19 Screening (If Applicable):   Lab Results   Component Value Date    COVID19 DETECTED 11/04/2020    COVID19 DETECTED 10/09/2020         Anesthesia Evaluation  Patient summary reviewed no history of anesthetic complications:   Airway: Mallampati: II  TM distance: >3 FB   Neck ROM: full  Mouth opening: > = 3 FB Dental:      Comment: No loose teeth    Pulmonary: breath sounds clear to auscultation  (+) current smoker    (-) COPD, asthma, shortness of breath, recent URI and sleep apnea                          ROS comment: Had covid in 9/2020, mild symptoms, \"states felt like a head cold\". Patient continued to test positive through November. He underwent right TKA on 11/4/2020    Patient remains asymptomatic    Cardiovascular:        (-) hypertension, valvular problems/murmurs, past MI, CAD, CABG/stent, dysrhythmias,  angina,  CHF, orthopnea and murmur    ECG reviewed  Rhythm: regular  Rate: normal                    Neuro/Psych:      (-) seizures, neuromuscular disease, TIA, CVA, headaches and psychiatric history           GI/Hepatic/Renal:        (-) GERD, PUD, hepatitis, liver disease, no renal disease and bowel prep       Endo/Other:    (+) : arthritis:., .    (-) diabetes mellitus, hypothyroidism, hyperthyroidism, blood dyscrasia               Abdominal:           Vascular:                                      Anesthesia Plan      MAC and spinal     ASA 2       Induction: intravenous.       Anesthetic plan and risks discussed with patient and spouse. Plan discussed with CRNA. This pre-anesthesia assessment may be used as a history and physical.    DOS STAFF ADDENDUM:    Pt seen and examined, chart reviewed (including anesthesia, drug and allergy history). No interval changes to history and physical examination. Anesthetic plan, risks, benefits, alternatives, and personnel involved discussed with patient. Patient verbalized an understanding and agrees to proceed.       Wali Malone MD  December 1, 2020  7:24 AM    Wali Malone MD   12/1/2020

## 2020-12-01 NOTE — PROGRESS NOTES
1030 pt arrived from OR, vitals stable on 2L NC. Left leg dressing clean, dry, and intact. Pt able to move both feet despite spinal pre-op. Bilateral pedal pulses 2+.

## 2020-12-01 NOTE — PLAN OF CARE
Problem: Tobacco Use:  Goal: Inpatient tobacco use cessation counseling participation  Description: Inpatient tobacco use cessation counseling participation  Outcome: Completed  Educated on tobacco cessation. Problem: Discharge Planning:  Goal: Discharged to appropriate level of care  Description: Discharged to appropriate level of care  Outcome: Completed   Patient educated on discharge plan and assessed to determine that needs are being met. Questions answered for patient. Patient encouraged to ask questions and voice concerns/comments in regards to barriers for discharge and any other questions about the plan of care. Problem: Mobility - Impaired:  Goal: Mobility will improve  Description: Mobility will improve  Outcome: Completed   Mobility is improving, patient is able to ambulate in room, hallway, and to bathroom with walker and x1 assistance. Will cont to monitor and reassess. Problem: Infection - Surgical Site:  Goal: Will show no infection signs and symptoms  Description: Will show no infection signs and symptoms  Outcome: Completed   Surgical dressing is clean, dry, and intact, no odor or drainage noted. Will continue to monitor and reassess. Problem: Pain - Acute:  Goal: Pain level will decrease  Description: Pain level will decrease  Outcome: Completed   Pt assessed for pain. Pt in pain and assessed with 0-10 pain rating scale. Pt given prescribed analgesic for pain. (See eMar) Pt satisfied with pain relief thus far. Will reassess and continue to monitor.      Electronically signed by Eric Merino RN on 12/1/2020 at 4:43 PM

## 2020-12-01 NOTE — TELEPHONE ENCOUNTER
I spoke with Amina Prasad at [address] [place] [address]. She said she spoke with the patient and he said no one called him to tell him he needed to be seen. Amina Prasad thinks that Dr. Chapis Dixon had his MA do a preop.

## 2020-12-01 NOTE — PROGRESS NOTES
Ambulatory Surgery/Procedure Discharge Note    Vitals:    12/01/20 1429   BP: 122/75   Pulse: 62   Resp: 18   Temp:    SpO2: 93%       No intake/output data recorded. Restroom use offered before discharge. Yes    Pain assessment:  present - adequately treated  Pain Level: 7    Discharge instructions given per Ortho nurse. Patient discharged to home/self care.  Patient discharged via wheel chair by transporter to waiting family/S.O.       12/1/2020 4:48 PM

## 2020-12-01 NOTE — PROGRESS NOTES
Data- discharge order received, patient verbalized agreement to discharge, disposition to previous residence, needs noted for [facility] and informed Denisse Jacobson NP. Action- discharge instructions prepared and given to patient and wife, patient and wife verbalized understanding. Medication information packet given r/t NEW and/or CHANGED prescriptions emphasizing name/purpose/side effects, pt verbalized understanding. Discharge instruction summary: Diet- general, Activity- wbat, Primary Care Physician as followsScotty LISHA Pinto - [phone]. f/u appointment with orthopedic office noted below, immunizations reviewed and discussed with patient, prescription medications to be filled by retail pharmacy and then delivered. Inpatient surgical procedure precautions reviewed: . Neurovascular check performed and patient is WNLs, denies numbness/tingling in extremties. Incision site covered by ace dressing assessed and is  clean,dry, and intact, no signs of redness, drainage, or odor noted. patient's bedside RN isaac notified of patient completing discharge instructions. incentive spirometer provided to patient and educated on purpose and use, patient demonstrated understanding. Ambulated 41feet in hallway, tolerated fairly well. Response-  Medications delivered to patient via meds to bed program. Disposition is home with [facility] , to be transported by wife, no complications.  [facility] karina notified of discharge    Future Appointments   Date Time Provider Wilma Drew   12/17/2020  1:00 PM [facility]   12/21/2020  9:30 AM Esther Cork, PT [facility] OP PT [facility]   12/23/2020  9:30 AM Thereasa Cork, PT [facility] PT [place]   12/24/2020  9:30 AM Thereasa Cork, PT [facility] PT [facility]   12/28/2020  9:30 AM Thereasa Cork, PT [facility] PT [facility]   12/30/2020  9:30 AM Thereasa Cork, PT [facility] OP PT [place] [facility]   12/31/2020  9:30 AM Gonzalez Soto, PT [facility] OP PT [facility]     Electronically Universal Safety Interventions

## 2020-12-01 NOTE — CARE COORDINATION
[facility]  Received referral from Romayne Rands orders to [address] care to see patient by   12/3/2020  Edward Mark LPN    [facility] CTN Cell [phone], Fax [phone]

## 2020-12-01 NOTE — PROGRESS NOTES
[facility]   Progress Note      S/P :  SUBJECTIVE  In chair. Alert and oriented Walked to BR and urinated postop. Pain is   described in left knee and with the intensity of mild. Pain is described as aching. OBJECTIVE              Physical                      VITALS:  /75   Pulse 62   Temp 97 °F (36.1 °C)   Resp 18   Ht 6' 2\" (1.88 m)   Wt 220 lb (99.8 kg)   SpO2 93%   BMI 28.25 kg/m²                     MUSCULOSKELETAL:  left foot NVI. Wiggles toes to command. Pedal pulses are palpable. NEUROLOGIC:                                  Sensory:  Touch:  Left Lower Extremity:  normal                                                 Surgical wound appears clean and dry left knee with ACe and ice.  SAL hose on right leg    Data       CBC:   Lab Results   Component Value Date    WBC 5.7 11/20/2020    RBC 4.38 11/20/2020    HGB 13.2 11/20/2020    HCT 39.2 11/20/2020    MCV 89.5 11/20/2020    MCH 30.2 11/20/2020    MCHC 33.7 11/20/2020    RDW 13.1 11/20/2020     11/20/2020    MPV 8.5 11/20/2020        WBC:    Lab Results   Component Value Date    WBC 5.7 11/20/2020        Hemoglobin/Hematocrit:    Lab Results   Component Value Date    HGB 13.2 11/20/2020    HCT 39.2 11/20/2020        PT/INR:    Lab Results   Component Value Date    PROTIME 12.5 11/20/2020    INR 1.08 11/20/2020              Current Inpatient Medications             Current Facility-Administered Medications: 0.9 % sodium chloride infusion, , Intravenous, Continuous  sodium chloride flush 0.9 % injection 10 mL, 10 mL, Intravenous, 2 times per day  sodium chloride flush 0.9 % injection 10 mL, 10 mL, Intravenous, PRN  fentaNYL (SUBLIMAZE) injection 25 mcg, 25 mcg, Intravenous, Q5 Min PRN  HYDROmorphone (DILAUDID) injection 0.5 mg, 0.5 mg, Intravenous, Q5 Min PRN  fentaNYL (SUBLIMAZE) injection 25 mcg, 25 mcg, Intravenous, Q5 Min PRN  HYDROmorphone (DILAUDID) injection 0.5 mg, 0.5 mg, Intravenous, Q5 Min PRN  oxyCODONE-acetaminophen (PERCOCET) 5-325 MG per tablet 1 tablet, 1 tablet, Oral, PRN **OR** oxyCODONE-acetaminophen (PERCOCET) 5-325 MG per tablet 2 tablet, 2 tablet, Oral, PRN  ondansetron (ZOFRAN) injection 4 mg, 4 mg, Intravenous, Once PRN  povidone-iodine (BETADINE) 10 % 30 mL in sodium chloride 0.9 % 1,000 mL irrigation, , , Continuous PRN  0.45 % sodium chloride infusion, , Intravenous, Continuous  sodium chloride flush 0.9 % injection 10 mL, 10 mL, Intravenous, 2 times per day  sodium chloride flush 0.9 % injection 10 mL, 10 mL, Intravenous, PRN  ondansetron (ZOFRAN-ODT) disintegrating tablet 4 mg, 4 mg, Oral, Q8H PRN **OR** ondansetron (ZOFRAN) injection 4 mg, 4 mg, Intravenous, Q6H PRN  oxyCODONE (ROXICODONE) immediate release tablet 5 mg, 5 mg, Oral, Q4H PRN  oxyCODONE HCl (OXY-IR) immediate release tablet 10 mg, 10 mg, Oral, Q4H PRN  acetaminophen (TYLENOL) tablet 650 mg, 650 mg, Oral, Q6H    ASSESSMENT AND PLAN      Post left TKA, stable exam  DVT prophylaxis ordered, Lovenox as inpt then switch to ASA 81mg twice at day for 14 days for DVT prophylaxis  Reviewed with patient importance of SAL hose on daily/ off at  Night for 2 weeks as well and continue anticoagulant medication at home as ordered to reduce risk of postoperative DVT or PE clots. Pt verbalized understanding.   PT OT for ADL's and ambulation as tolerated  SS for DC planning, home with home care today  IV or PO pain med as ordered    Héctor Daniel 91  12/1/2020  4:33 PM

## 2020-12-01 NOTE — ANESTHESIA PROCEDURE NOTES
Spinal Block    Patient location during procedure: OR  Start time: 12/1/2020 9:00 AM  End time: 12/1/2020 9:05 AM  Reason for block: primary anesthetic and at surgeon's request  Staffing  Anesthesiologist: Kinga Porter MD  Resident/CRNA: LISHA Louis CRNA  Performed: resident/CRNA and anesthesiologist   Preanesthetic Checklist  Completed: patient identified, site marked, surgical consent, pre-op evaluation, timeout performed, IV checked, risks and benefits discussed, monitors and equipment checked, anesthesia consent given, oxygen available and patient being monitored  Spinal Block  Patient position: sitting  Prep: ChloraPrep  Patient monitoring: cardiac monitor, continuous pulse ox, continuous capnometry and frequent blood pressure checks  Approach: midline  Location: L4/L5  Provider prep: mask and sterile gloves  Local infiltration: lidocaine  Agent: bupivacaine  Dose: 1.6  Dose: 1.6  Needle  Needle type: Lynncke   Needle gauge: 25 G  Needle length: 3.5 in  Kit: Agilvax   Lot number: [id number]  Expiration date: 11/30/2021  Assessment  Sensory level: T10  Events: cerebrospinal fluid  Swirl obtained: Yes  CSF: clear  Attempts: 1  Hemodynamics: stable

## 2020-12-01 NOTE — PROGRESS NOTES
CLINICAL PHARMACY NOTE: MEDS TO [facility] Select Patient?: No  Total # of Prescriptions Filled: 3   The following medications were delivered to the patient:  Current Discharge Medication List      START taking these medications    Details   oxyCODONE (ROXICODONE) 5 MG immediate release tablet Take 1-2 tablets by mouth every 6 hours as needed for Pain for up to 5 days. Qty: 40 tablet, Refills: 0    Comments: Reduce doses taken as pain becomes manageable  Associated Diagnoses: Arthritis of left knee      aspirin EC 81 MG EC tablet Take 1 tablet by mouth 2 times daily for 14 days Please avoid missing doses.   Qty: 28 tablet, Refills: 0         · Cephalexin 500mg  ·   Total # of Interventions Completed: 0  Time Spent (min): 30    Additional Documentation:

## 2020-12-07 ENCOUNTER — TELEPHONE (OUTPATIENT)
Dept: ORTHOPEDICS UNIT | Age: 60
End: 2020-12-07

## 2020-12-07 ENCOUNTER — TELEPHONE (OUTPATIENT)
Dept: ORTHOPEDIC SURGERY | Age: 60
End: 2020-12-07

## 2020-12-07 NOTE — TELEPHONE ENCOUNTER
can you call Bharati harper regarding his ortho vitals, he cant enter his info after having surg.    [phone]        Sent to Raoul Posada this am.

## 2020-12-07 NOTE — TELEPHONE ENCOUNTER
Received message from ortho office loretta patient having difficulty accessing ortho vitals account. Called patient and tried to troubleshoot. Messaged ortho vitals  marichuy to solve login issue.   Electronically signed by Raul Stephen RN on 12/7/2020 at 9:57 AM

## 2020-12-08 ENCOUNTER — TELEPHONE (OUTPATIENT)
Dept: ORTHOPEDIC SURGERY | Age: 60
End: 2020-12-08

## 2020-12-08 NOTE — TELEPHONE ENCOUNTER
Pt still cant get into the ortho vitals for him to document. Would like you to send him another [facility] so he can re-try to enter info.

## 2020-12-09 ENCOUNTER — TELEPHONE (OUTPATIENT)
Dept: ORTHOPEDICS UNIT | Age: 60
End: 2020-12-09

## 2020-12-09 NOTE — TELEPHONE ENCOUNTER
Spoke with patient regarding post discharge from hospital.    Incision status: No drainage, odor, or redness noted per patient    Edema/Swelling/Teds: edema noted, wearing teds, educated on elevating legs when resting    Pain level and status: 7/10, stopped oxycodone and taking just tylenol now     Use of pain medications: yes ; Patient stated they are taking their pain medication tylenol as prescribed. Use of ice therapy: yes , using ice bags due to ice machine having issues - discussed machine issues. Air in line    Blood thinner: aspirin ; Verified with patient that they are taking their anticoagulant as prescribed twice a day. Bowels: no constipation currently    Home Care Agency active: yes ; Claims already worked with home therapist .  Outpatient therapy: n/a    Do you have all of your medications: yes    Changes in medications: no    Ortho Vitals: discussed link and access. No further questions at this time. No other questions/concerns at this time. Encouraged patient to call Orthopedic Nurse [phone] [address] or Orthopedic office if has any questions/concerns.       Follow up appointments:    Future Appointments   Date Time Provider Wilma Drew   12/17/2020  1:00 PM Ej Simmons ORTHO [facility]   12/21/2020  9:30 AM [facility], PT [facility] PT [facility]   12/23/2020  9:30 AM [facility], PT [facility] PT [facility]   12/24/2020  9:30 AM [facility], PT [facility] PT [facility]   12/28/2020  9:30 AM [facility], PT [facility] PT [facility]   12/30/2020  9:30 AM [facility], PT [facility] PT [facility]   12/31/2020  9:30 AM Baron Love, PT [facility] PT [facility]     Electronically signed by Nico Culver RN on 12/9/2020 at 1:00 PM

## 2020-12-10 ENCOUNTER — TELEPHONE (OUTPATIENT)
Dept: ORTHOPEDIC SURGERY | Age: 60
End: 2020-12-10

## 2020-12-10 RX ORDER — OXYCODONE HYDROCHLORIDE 5 MG/1
5-10 TABLET ORAL EVERY 6 HOURS PRN
Qty: 40 TABLET | Refills: 0 | Status: SHIPPED | OUTPATIENT
Start: 2020-12-10 | End: 2020-12-15

## 2020-12-17 ENCOUNTER — OFFICE VISIT (OUTPATIENT)
Dept: ORTHOPEDIC SURGERY | Age: 60
End: 2020-12-17

## 2020-12-17 VITALS — TEMPERATURE: 97.8 F | WEIGHT: 220 LBS | HEIGHT: 74 IN | BODY MASS INDEX: 28.23 KG/M2

## 2020-12-17 PROBLEM — Z96.651 STATUS POST TOTAL KNEE REPLACEMENT, RIGHT: Status: ACTIVE | Noted: 2020-12-17

## 2020-12-17 PROBLEM — Z96.652 STATUS POST TOTAL LEFT KNEE REPLACEMENT: Status: ACTIVE | Noted: 2020-12-17

## 2020-12-17 PROCEDURE — 99024 POSTOP FOLLOW-UP VISIT: CPT | Performed by: PHYSICIAN ASSISTANT

## 2020-12-18 NOTE — PROGRESS NOTES
Subjective:      Patient ID: Shelby Higginbotham is a 61 y.o.  male. Chief Complaint   Patient presents with    Post-Op Check     left TKA and manipulation right TKA DOS 12/1/2020        HPI:  Here for post op visit. S/P left knee arthroplasty. Close manipulation of a previous right knee arthroplasty. The date of procedure-12/1/2020. Pain: 5/10  He has weaned himself off of the oxycodone. Trying to get by with NSAIDs and Tylenol. Review of Systems:   Negative for fever or chills.      Past Medical History:   Diagnosis Date    Arthritis     Lab test positive for detection of COVID-19 virus     9/2020       Family History   Problem Relation Age of Onset    Arthritis Mother     Heart Disease Father     Pacemaker Father     Kidney Disease Father     No Known Problems Sister     No Known Problems Brother     No Known Problems Maternal Aunt     No Known Problems Maternal Uncle     No Known Problems Paternal Aunt     No Known Problems Paternal Uncle     No Known Problems Maternal Grandmother     No Known Problems Maternal Grandfather     No Known Problems Paternal Grandmother     No Known Problems Paternal Grandfather     No Known Problems Other     Anesth Problems Neg Hx     Broken Bones Neg Hx     Cancer Neg Hx     Clotting Disorder Neg Hx     Collagen Disease Neg Hx     Diabetes Neg Hx     Dislocations Neg Hx     Osteoporosis Neg Hx     Rheumatologic Disease Neg Hx     Scoliosis Neg Hx     Severe Sprains Neg Hx        Past Surgical History:   Procedure Laterality Date    HERNIA REPAIR Right     inguinal hernia X 2    KNEE ARTHROSCOPY Left     KNEE ARTHROSCOPY Right     TOTAL KNEE ARTHROPLASTY Right 11/4/2020    ROBOTIC ASSISTED RIGHT TOTAL KNEE REPLACEMENT performed by Joseph Chavis MD at [address] [place] [address] Bilateral 12/1/2020 LEFT TOTAL KNEE REPLACEMENT ROBOTIC ASSISTED, AND MANIPULATION RIGHT KNEE performed by Chetan Moncada MD at [address] History     Occupational History    Occupation: outside    Tobacco Use    Smoking status: Current Some Day Smoker     Packs/day: 0.00     Years: 15.00     Pack years: 0.00     Types: Cigars    Smokeless tobacco: Never Used    Tobacco comment: rare   Substance and Sexual Activity    Alcohol use: Yes     Comment: Occ     Drug use: Never    Sexual activity: Yes     Partners: Female       Current Outpatient Medications   Medication Sig Dispense Refill    cephALEXin (KEFLEX) 500 MG capsule Take 1 capsule by mouth See Admin Instructions Take one capsule at 9pm today and one capsule at 9am tomorrow 2 capsule 0    aspirin EC 81 MG EC tablet Take 1 tablet by mouth 2 times daily for 14 days Please avoid missing doses. 28 tablet 0    Multiple Vitamins-Minerals (MULTIVITAMIN PO) Take 1 tablet by mouth daily. No current facility-administered medications for this visit. Objective:   He is alert, oriented x 3, pleasant, well nourished, developed and in no acute distress. Temp 97.8 °F (36.6 °C)   Ht 6' 2\" (1.88 m)   Wt 220 lb (99.8 kg)   BMI 28.25 kg/m²      Examination of the left knee:   Incision: Healing uneventfully. There is moderate swelling. The overall alignment of the knee is neutral.  AROM     Extension 0     Flexion 75  There is no instability with varus/valgus stress testing in full extension or mid flexion. There is no instability with anterior drawer testing. Extensor Mechanism is intact. Examination of the right knee:   Incision: Healing uneventfully. There is mild swelling. The overall alignment of the knee is neutral.  AROM     Extension 0     Flexion 125  There is no instability with varus/valgus stress testing in full extension or mid flexion. There is no instability with anterior drawer testing. Extensor Mechanism is intact. X Rays: was performed in the office today:   AP Standing, Lateral and Sunrise Left Knee: There is a left uncemeted knee arthroplasty present. The alignment is satisfactory. There are no signs of failure or loosening. Diagnosis:        ICD-10-CM    1. Status post total left knee replacement  Z96.652 XR KNEE LEFT (3 VIEWS)     [facility] Physical Therapy [facility]   2. Status post total knee replacement, right ; DOS 11.4.2020  [facility]patient Physical Therapy [facility]          Assessment/plan:     Clinically and radiographically stable left knee arthroplasty performed 12/1/2020 and right total knee arthroplasty index procedure 11/4/2020, status post closed manipulation 12/1/2020. Continue with physical therapy/ rehab for knee arthroplasty per protocol. DVT prophylaxis- for 2 weeks post op. .  SAL Hose for up to 6 weeks post op. Dressing/incision care discussed. Continue with pain medications as needed for moderate to severe postoperative pain. May substitute Tylenol for mild to moderate pain complaints. Activity restrictions discussed today. Follow Up: 3-4 weeks. Call or return to clinic prn if these symptoms worsen or fail to improve as anticipated.

## 2020-12-21 ENCOUNTER — HOSPITAL ENCOUNTER (OUTPATIENT)
Dept: PHYSICAL THERAPY | Age: 60
Setting detail: THERAPIES SERIES
Discharge: HOME | End: 2020-12-21
Payer: COMMERCIAL

## 2020-12-21 PROCEDURE — 97140 MANUAL THERAPY 1/> REGIONS: CPT

## 2020-12-21 PROCEDURE — 97161 PT EVAL LOW COMPLEX 20 MIN: CPT

## 2020-12-21 PROCEDURE — 97110 THERAPEUTIC EXERCISES: CPT

## 2020-12-21 NOTE — PROGRESS NOTES
prolonged postures     Type: [x]Constant   []Intermittent  []Radiating []Localized []other:     Numbness/Tingling: bobby left knee only    Occupation/School:works in sales     Living Status/Prior Level of Function: pt is independent with personal care     OBJECTIVE:     Posture: good     Functional Mobility/Transfers:pt is independent     Palpation: reports decreased sensation at left bobby scar     Bandages/Dressings/Incisions: incision line C&D well approximated     Gait: pt walks with a spc , decreased step length and knee flexion     ROM LEFT RIGHT   HIP Flex     HIP Abd     HIP Ext     HIP IR     HIP ER     Knee ext -6 -2   Knee Flex 76 115   Ankle PF     Ankle DF     Ankle In     Ankle Ev     Strength  LEFT RIGHT   HIP Flexors     HIP Abductors     HIP Ext     Hip ER     Knee EXT (quad) 3+ 4+   Knee Flex (HS) 4- 4+   Ankle DF 4+ 5   Ankle PF     Ankle Inv     Ankle EV          Circumference  Mid apex  7 cm prox             Reflexes/Sensation:    [x]Dermatomes/Myotomes intact    [x]Reflexes equal and normal bilaterally   []Other:    Joint mobility: left knee    []Normal    [x]Hypo   []Hyper    Orthopedic Special Tests: Lane's negative                        [x] Patient history, allergies, meds reviewed. Medical chart reviewed. See intake form. Review Of Systems (ROS):  [x]Performed Review of systems (Integumentary, CardioPulmonary, Neurological) by intake and observation. Intake form has been scanned into medical record. Patient has been instructed to contact their primary care physician regarding ROS issues if not already being addressed at this time.       Co-morbidities/Complexities (which will affect course of rehabilitation):   []None           Arthritic conditions   []Rheumatoid arthritis (M05.9)  [x]Osteoarthritis (M19.91)   Cardiovascular conditions   []Hypertension (I10)  []Hyperlipidemia (E78.5)  []Angina pectoris (I20)  []Atherosclerosis (I70)  []CVA Musculoskeletal conditions   []Disc pathology []Congenital spine pathologies   []Prior surgical intervention  []Osteoporosis (M81.8)  []Osteopenia (M85.8)   Endocrine conditions   []Hypothyroid (E03.9)  []Hyperthyroid Gastrointestinal conditions   []Constipation (J84.11)   Metabolic conditions   []Morbid obesity (E66.01)  []Diabetes type 1(E10.65) or 2 (E11.65)   []Neuropathy (G60.9)     Pulmonary conditions   []Asthma (J45)  []Coughing   []COPD (J44.9)   Psychological Disorders  []Anxiety (F41.9)  []Depression (F32.9)   []Other:   []Other:          Barriers to/and or personal factors that will affect rehab potential:              []Age  []Sex    []Smoker              []Motivation/Lack of Motivation                        []Co-Morbidities              []Cognitive Function, education/learning barriers              []Environmental, home barriers              [x]profession/work barriers  [x]past PT/medical experience  []other:  Justification: recent RTKA    Falls Risk Assessment (30 days):  [x] Falls Risk assessed and no intervention required.   [] Falls Risk assessed and Patient requires intervention due to being higher risk   TUG score (>12s at risk):     [] Falls education provided, including         ASSESSMENT:   Functional Impairments:     []Noted lumbar/proximal hip/LE hypomobility   [x]Decreased LE functional ROM   []Decreased core/proximal hip strength and neuromuscular control   [x]Decreased LE functional strength   [x]Reduced balance/proprioceptive control   []other:      Functional Activity Limitations (from functional questionnaire and intake)   [x]Reduced ability to tolerate prolonged functional positions   [x]Reduced ability or difficulty with changes of positions or transfers between positions   []Reduced ability to maintain good posture and demonstrate good body mechanics with sitting, bending, and lifting   []Reduced ability to sleep   [x] Reduced ability or tolerance with driving and/or computer work   [x]Reduced ability to perform lifting, carrying tasks   [x]Reduced ability to squat   []Reduced ability to forward bend   [x]Reduced ability to ambulate prolonged functional periods/distances/surfaces   [x]Reduced ability to ascend/descend stairs   [x]Reduced ability to run, hop or jump   []other:     Participation Restrictions   []Reduced participation in self care activities   []Reduced participation in home management activities   [x]Reduced participation in work activities   [x]Reduced participation in social activities. [x]Reduced participation in sport activities. Classification :    [x]Signs/symptoms consistent with post-surgical status including decreased ROM, strength and function.    []Signs/symptoms consistent with joint sprain/strain   []Signs/symptoms consistent with patella-femoral syndrome   []Signs/symptoms consistent with knee OA/hip OA   []Signs/symptoms consistent with internal derangement of knee/Hip   []Signs/symptoms consistent with functional hip weakness/NMR control      []Signs/symptoms consistent with tendinitis/tendinosis    []signs/symptoms consistent with pathology which may benefit from Dry needling      []other:      Prognosis/Rehab Potential:      []Excellent   [x]Good    []Fair   []Poor    Tolerance of evaluation/treatment:    []Excellent   [x]Good    []Fair   []Poor    Physical Therapy Evaluation Complexity Justification  [x] A history of present problem with:  [] no personal factors and/or comorbidities that impact the plan of care;  [x]1-2 personal factors and/or comorbidities that impact the plan of care  []3 personal factors and/or comorbidities that impact the plan of care  [x] An examination of body systems using standardized tests and measures addressing any of the following: body structures and functions (impairments), activity limitations, and/or participation restrictions;:  [x] a total of 1-2 or more elements   [] a total of 3 or more elements   [] a total of 4 or more elements   [x] A clinical presentation with:  [x] stable and/or uncomplicated characteristics   [] evolving clinical presentation with changing characteristics  [] unstable and unpredictable characteristics;   [x] Clinical decision making of [x] low, [] moderate, [] high complexity using standardized patient assessment instrument and/or measurable assessment of functional outcome. [x] EVAL (LOW) 75354 (typically 20 minutes face-to-face)  [] EVAL (MOD) 41337 (typically 30 minutes face-to-face)  [] EVAL (HIGH) 43743 (typically 45 minutes face-to-face)  [] RE-EVAL     PLAN:  Frequency/Duration:  3 days per week [id number]:  Interventions:  [x]  Therapeutic exercise including: strength training, ROM, for Lower extremity and core   [x]  NMR activation and proprioception for LE, Glutes and Core   [x]  Manual therapy as indicated for LE, Hip and spine to include: Dry Needling/IASTM, STM, PROM, Gr I-IV mobilizations, manipulation. [x] Modalities as needed that may include: thermal agents, E-stim, Biofeedback, US, iontophoresis as indicated  [x] Patient education on joint protection, postural re-education, activity modification, progression of HEP. HEP instruction: Access Code: [email or web address]   URL: [email or web address]   Date: 12/21/2020   Prepared by: Jessika Osuna     Exercises   Seated Knee Flexion Stretch - 4 reps - 1 sets - 30 hold - 2x daily - 7x weekly   The patient demonstrated good tolerance to and understanding of the HEP. Written instructions have been issued. GOALS:  Patient stated goal: \"improve range of motion and ability to do day to day activities\"  [x] Progressing: [] Met: [] Not Met: [] Adjusted    Therapist goals for Patient:   Short Term Goals: To be achieved in: 2 weeks  1. Independent in HEP and progression per patient tolerance, in order to prevent re-injury. [x] Progressing: [] Met: [] Not Met: [] Adjusted  2.  Patient will have a decrease in pain to facilitate improvement in movement, function, and ADLs as indicated by Functional Deficits. [] Progressing: [] Met: [] Not Met: [] Adjusted    Long Term Goals: To be achieved in: 6 weeks  1. Disability index score of 42 or better for the LEFS to assist with reaching prior level of function. [x] Progressing: [] Met: [] Not Met: [] Adjusted  2. Patient will demonstrate increased AROM to 3-110 to allow for proper joint functioning as indicated by patients Functional Deficits. [x] Progressing: [] Met: [] Not Met: [] Adjusted  3. Patient will demonstrate an increase in Strength to good proximal hip strength and control, within 5lb HHD in LE to allow for proper functional mobility as indicated by patients Functional Deficits. [x] Progressing: [] Met: [] Not Met: [] Adjusted  4. Pt will ambulate a community level without a device, ascend/ descend stirs taking reciprocal steps   [x] Progressing: [] Met: [] Not Met: [] Adjusted     Electronically signed by:  Laxmi Villafana, PT      Note: If patient does not return for scheduled/recommended follow up visits, this note will serve as a discharge from care along with the most recent update on progress.

## 2020-12-21 NOTE — FLOWSHEET NOTE
[facility] - Outpatient Rehabilitation & Therapy  [address]  [place]  Phone: [phone]   Fax:     [phone]      Physical Therapy Treatment Note/ Progress Report:     Date:  2020    Patient Name:  Judit Blackburn    :  1960  MRN: [id number]    Pertinent Medical History:     Medical/Treatment Diagnosis Information:  · Diagnosis: Status post total left knee replacement (Z96.652  · Treatment Diagnosis: Gait and mobility dysfuction s/p L TKA    Insurance/Certification information:  PT Insurance Information: [employer]  Physician Information:  Referring Practitioner: Dr. Jose Patel of care signed (Y/N): routed 20     Date of Patient follow up with Physician:      Progress Report: []  Yes  [x]  No     Date Range for reporting period:  Beginnin2020  Ending:      Progress report due (10 Rx/or 30 days whichever is less):     Recertification due (POC duration/ or 90 days whichever is less):     Visit # POC/Insurance Allowable Auth Needed   1 1 []Yes   []No     Latex Allergy:  [x]NO      []YES  Preferred Language for Healthcare:   [x]English       []Other:    Functional Scale:      Date assessed: at eval  Test: [id number]  Score:    Pain level:  4/10     History of Injury:    SUBJECTIVE:  See eval    OBJECTIVE:   Observation:    Test measurements:      RESTRICTIONS/PRECAUTIONS: Recent L TKA    Exercises/Interventions:     Therapeutic Ex (31492)   Min: Reps/Resistance Notes/CUES   Nu step     Leg press     incline     QSS     Mini squats                          Mat ex     Strap assist  Knee flex 5\" 25 x    Strap assist quad setting 10\" x 12     LAQ 2# 30 x          Seated chair assist knee flexion  30\" x 4          Manual Intervention (65916)  Min: 12 min    Knee mobs/PROM Knee flex and ext to tolerance     Tib/Fem Mobs     Patella Mobs Grade 1 sup/ inf    Ankle mobs               NMR re-education (27611)  Min:  CUES NEEDED             Therapeutic Activity (28069)  Min:               Modalities  Min:     IFC with      CP after exercises     MH after exercises            Other Therapeutic Activities: Pt was educated on PT POC, Diagnosis, Prognosis, pathomechanics as well as frequency and duration of scheduling future physical therapy appointments. Time was also taken on this day to answer all patient questions and participation in PT. Reviewed appointment policy in detail with patient and patient verbalized understanding. Home Exercise Program: HEP instruction: Access Code: [email or web address]   URL: [employer]/   Date: 12/21/2020   Prepared by: Keli Avila     Exercises   · Seated Knee Flexion Stretch - 4 reps - 1 sets - 30 hold - 2x daily - 7x weekly   The patient demonstrated good tolerance to and understanding of the HEP. Written instructions have been issued. Patient was instructed in the following for HEP:     . Patient verbalized/demonstrated understanding and was issued written handout. Therapeutic Exercise and NMR EXR  [] (92838) Provided verbal/tactile cueing for activities related to strengthening, flexibility, endurance, ROM for improvements in LE, proximal hip, and core control with self care, mobility, lifting, ambulation.  [] (84791) Provided verbal/tactile cueing for activities related to improving balance, coordination, kinesthetic sense, posture, motor skill, proprioception  to assist with LE, proximal hip, and core control in self care, mobility, lifting, ambulation and eccentric single leg control.      NMR and Therapeutic Activities:    [] (00073 or 32851) Provided verbal/tactile cueing for activities related to improving balance, coordination, kinesthetic sense, posture, motor skill, proprioception and motor activation to allow for proper function of core, proximal hip and LE with self care and ADLs and functional mobility.   [] (16569) Gait Re-education- Provided training and instruction to the patient for proper LE, core and proximal hip recruitment and positioning and eccentric body weight control with ambulation re-education including up and down stairs     Home Exercise Program:    [x] (92502) Reviewed/Progressed HEP activities related to strengthening, flexibility, endurance, ROM of core, proximal hip and LE for functional self-care, mobility, lifting and ambulation/stair navigation   [] (72218)Reviewed/Progressed HEP activities related to improving balance, coordination, kinesthetic sense, posture, motor skill, proprioception of core, proximal hip and LE for self care, mobility, lifting, and ambulation/stair navigation      Manual Treatments:  PROM / STM / Oscillations-Mobs:  G-I, II, III, IV (PA's, Inf., Post.)  [] (87943) Provided manual therapy to mobilize LE, proximal hip and/or LS spine soft tissue/joints for the purpose of modulating pain, promoting relaxation,  increasing ROM, reducing/eliminating soft tissue swelling/inflammation/restriction, improving soft tissue extensibility and allowing for proper ROM for normal function with self care, mobility, lifting and ambulation. If [facility] Please Indicate Time In/Out  CPT Code Time in Time out                         Charges:  Timed Code Treatment Minutes: 30   Total Treatment Minutes: 55      [x] EVAL (LOW) 97470 (typically 20 minutes face-to-face)  [] EVAL (MOD) 77083 (typically 30 minutes face-to-face)  [] EVAL (HIGH) 48210 (typically 45 minutes face-to-face)  [] RE-EVAL     [x] FF(38712) x     [] Dry needle 1 or 2 Muscles (84331)  [] NMR (66746) x     [] Dry needle 3+ Muscles (55815)  [x] Manual (45013) x     [] Ultrasound (73908) x  [] TA (85032) x     [] Mech Traction (11263)  [] ES(attended) (45427)     [] ES (un) (12964):   [] Vasopump (05295) [] Sayda Garza (15467)   [] Other:    GOALS:Short Term Goals: To be achieved in: 2 weeks  1. Independent in HEP and progression per patient tolerance, in order to prevent re-injury. care [] Alter current plan (see comments)  [x] Plan of care initiated [] Hold pending MD visit [] Discharge    Electronically signed by: Noel Christian PT    Note: If patient does not return for scheduled/recommended follow up visits, this note will serve as a discharge from care along with the most recent update on progress.

## 2020-12-23 ENCOUNTER — HOSPITAL ENCOUNTER (OUTPATIENT)
Dept: PHYSICAL THERAPY | Age: 60
Setting detail: THERAPIES SERIES
Discharge: HOME | End: 2020-12-23
Payer: COMMERCIAL

## 2020-12-23 PROCEDURE — 97140 MANUAL THERAPY 1/> REGIONS: CPT

## 2020-12-23 PROCEDURE — 97110 THERAPEUTIC EXERCISES: CPT

## 2020-12-23 NOTE — FLOWSHEET NOTE
[facility] - Outpatient Rehabilitation & Therapy  [address] [place]  Phone: [phone]   Fax:     [phone]      Physical Therapy Treatment Note/ Progress Report:     Date:  2020    Patient Name:  Channing Dowell    :  1960  MRN: [id number]    Pertinent Medical History:     Medical/Treatment Diagnosis Information:  · Diagnosis: Status post total left knee replacement (Z96.652  · Treatment Diagnosis: Gait and mobility dysfuction s/p L TKA    Insurance/Certification information:  PT Insurance Information: [employer]  Physician Information:  Referring Practitioner: Dr. Jf Che of care signed (Y/N): routed 20     Date of Patient follow up with Physician:      Progress Report: []  Yes  [x]  No     Date Range for reporting period:  Beginnin2020  Ending:      Progress report due (10 Rx/or 30 days whichever is less):     Recertification due (POC duration/ or 90 days whichever is less):     Visit # POC/Insurance Allowable Auth Needed   2  [x]Yes   []No     Latex Allergy:  [x]NO      []YES  Preferred Language for Healthcare:   [x]English       []Other:    Functional Scale:      Date assessed: at eval  Test: [id number]  Score:    Pain level:  2-3/10 on R and 4-5/10 on L     History of Injury:    SUBJECTIVE:  See eval  20: Pt reports that his greatest problem is limited ROM.     OBJECTIVE:   Observation:    Test measurements:  20: -    RESTRICTIONS/PRECAUTIONS: Recent L TKA    Exercises/Interventions:     Therapeutic Ex (29557)   Min: Reps/Resistance Notes/CUES   Nu step 5 min    Leg press 75# , 2 x 20    incline 3 x 30 sec    QSS 3 X 30 sec    Mini squats  20X                        Mat ex     Strap assist  Knee flex 5\" 25 x    Strap assist quad setting 10\" x 12     LAQ 2# 30 x          Seated chair assist knee flexion  30\" x 4          Manual Intervention (65208)  Min: 12 min Knee mobs/PROM Knee flex and ext to tolerance     Tib/Fem Mobs     Patella Mobs Grade 1 sup/ inf    Ankle mobs               NMR re-education (07226)  Min:  CUES NEEDED             Therapeutic Activity (76940)  Min:               Modalities  Min:     IFC with      CP after exercises     MH after exercises            Other Therapeutic Activities: Pt was educated on PT POC, Diagnosis, Prognosis, pathomechanics as well as frequency and duration of scheduling future physical therapy appointments. Time was also taken on this day to answer all patient questions and participation in PT. Reviewed appointment policy in detail with patient and patient verbalized understanding. Home Exercise Program: HEP instruction: Access Code: [email or web address]   URL: [email or web address]   Date: 12/21/2020   Prepared by: Rachel Loco     Exercises   · Seated Knee Flexion Stretch - 4 reps - 1 sets - 30 hold - 2x daily - 7x weekly   The patient demonstrated good tolerance to and understanding of the HEP. Written instructions have been issued. Patient was instructed in the following for HEP:     . Patient verbalized/demonstrated understanding and was issued written handout. Therapeutic Exercise and NMR EXR  [] (95350) Provided verbal/tactile cueing for activities related to strengthening, flexibility, endurance, ROM for improvements in LE, proximal hip, and core control with self care, mobility, lifting, ambulation.  [] (86838) Provided verbal/tactile cueing for activities related to improving balance, coordination, kinesthetic sense, posture, motor skill, proprioception  to assist with LE, proximal hip, and core control in self care, mobility, lifting, ambulation and eccentric single leg control.      NMR and Therapeutic Activities:    [] (80948 or 21030) Provided verbal/tactile cueing for activities related to improving balance, coordination, kinesthetic sense, posture, motor skill, proprioception and motor activation to allow for proper function of core, proximal hip and LE with self care and ADLs and functional mobility.   [] (14761) Gait Re-education- Provided training and instruction to the patient for proper LE, core and proximal hip recruitment and positioning and eccentric body weight control with ambulation re-education including up and down stairs     Home Exercise Program:    [x] (28328) Reviewed/Progressed HEP activities related to strengthening, flexibility, endurance, ROM of core, proximal hip and LE for functional self-care, mobility, lifting and ambulation/stair navigation   [] (81964)Reviewed/Progressed HEP activities related to improving balance, coordination, kinesthetic sense, posture, motor skill, proprioception of core, proximal hip and LE for self care, mobility, lifting, and ambulation/stair navigation      Manual Treatments:  PROM / STM / Oscillations-Mobs:  G-I, II, III, IV (PA's, Inf., Post.)  [] (46554) Provided manual therapy to mobilize LE, proximal hip and/or LS spine soft tissue/joints for the purpose of modulating pain, promoting relaxation,  increasing ROM, reducing/eliminating soft tissue swelling/inflammation/restriction, improving soft tissue extensibility and allowing for proper ROM for normal function with self care, mobility, lifting and ambulation.      If [facility] Please Indicate Time In/Out  CPT Code Time in Time out                         Charges:  Timed Code Treatment Minutes: 50   Total Treatment Minutes: 50      [] EVAL (LOW) 50180 (typically 20 minutes face-to-face)  [] EVAL (MOD) 96237 (typically 30 minutes face-to-face)  [] EVAL (HIGH) 95148 (typically 45 minutes face-to-face)  [] RE-EVAL     [x] XO(21011) x   2  [] Dry needle 1 or 2 Muscles (04001)  [] NMR (16240) x     [] Dry needle 3+ Muscles (44087)  [x] Manual (82815) x   1  [] Ultrasound (27033) x  [] TA (46812) x     [] Mech Traction (44010)  [] ES(attended) (57650)     [] ES (un) (13884):   [] Vasopump (57101) [] Ionto (26528)   [] Other:    GOALS:Short Term Goals: To be achieved in: 2 weeks  1. Independent in HEP and progression per patient tolerance, in order to prevent re-injury. [x]? Progressing: []? Met: []? Not Met: []? Adjusted  2. Patient will have a decrease in pain to facilitate improvement in movement, function, and ADLs as indicated by Functional Deficits. []? Progressing: []? Met: []? Not Met: []? Adjusted     Long Term Goals: To be achieved in: 6 weeks  1. Disability index score of 42 or better for the LEFS to assist with reaching prior level of function. [x]? Progressing: []? Met: []? Not Met: []? Adjusted  2. Patient will demonstrate increased AROM to 3-110 to allow for proper joint functioning as indicated by patients Functional Deficits. [x]? Progressing: []? Met: []? Not Met: []? Adjusted  3. Patient will demonstrate an increase in Strength to good proximal hip strength and control, within 5lb HHD in LE to allow for proper functional mobility as indicated by patients Functional Deficits. [x]? Progressing: []? Met: []? Not Met: []? Adjusted  4. Pt will ambulate a community level without a device, ascend/ descend stirs taking reciprocal steps   [x]? Progressing: []? Met: []? Not Met: []? Adjusted     ASSESSMENT:  See eval    Treatment/Activity Tolerance:  [x] Patient tolerated treatment well [] Patient limited by fatique  [] Patient limited by pain  [] Patient limited by other medical complications  [] Other:     Overall Progression Towards Functional goals/ Treatment Progress Update:  [] Patient is progressing as expected towards functional goals listed. [] Progression is slowed due to complexities/Impairments listed. [] Progression has been slowed due to co-morbidities.   [x] Plan just implemented, too soon to assess goals progression <30days   [] Goals require adjustment due to lack of progress  [] Patient is not progressing as expected and requires additional follow up with physician  [] Other    Prognosis for POC: [x] Good [] Fair  [] Poor    Patient requires continued skilled intervention: [x] Yes  [] No        PLAN: Continue as above   [x] Continue per plan of care [] Alter current plan (see comments)  [] Plan of care initiated [] Hold pending MD visit [] Discharge    Electronically signed by: Rudy Jarrell PT    Note: If patient does not return for scheduled/recommended follow up visits, this note will serve as a discharge from care along with the most recent update on progress.

## 2020-12-24 ENCOUNTER — HOSPITAL ENCOUNTER (OUTPATIENT)
Dept: PHYSICAL THERAPY | Age: 60
Setting detail: THERAPIES SERIES
Discharge: HOME | End: 2020-12-24
Payer: COMMERCIAL

## 2020-12-24 PROCEDURE — 97110 THERAPEUTIC EXERCISES: CPT

## 2020-12-24 PROCEDURE — 97140 MANUAL THERAPY 1/> REGIONS: CPT

## 2020-12-24 NOTE — FLOWSHEET NOTE
[facility] - Outpatient Rehabilitation & Therapy  [address] [facility]. [place] [address]  Phone: [phone]   Fax:     [phone]      Physical Therapy Treatment Note/ Progress Report:     Date:  2020    Patient Name:  Jason Bradley    :  1960  MRN: [id number]    Pertinent Medical History:     Medical/Treatment Diagnosis Information:  · Diagnosis: Status post total left knee replacement (Z96.652  · Treatment Diagnosis: Gait and mobility dysfuction s/p L TKA    Insurance/Certification information:  PT Insurance Information: [employer]  Physician Information:  Referring Practitioner: Dr. Reyes Heart of care signed (Y/N): routed 20     Date of Patient follow up with Physician:      Progress Report: []  Yes  [x]  No     Date Range for reporting period:  Beginnin2020  Ending:      Progress report due (10 Rx/or 30 days whichever is less):     Recertification due (POC duration/ or 90 days whichever is less):     Visit # POC/Insurance Allowable Auth Needed   3  [x]Yes   []No     Latex Allergy:  [x]NO      []YES  Preferred Language for Healthcare:   [x]English       []Other:    Functional Scale:      Date assessed: at eval  Test: [id number]  Score:    Pain level:  2-3/10 on R and 4-5/10 on L     History of Injury:    SUBJECTIVE:  See eval  20: Pt reports that his greatest problem is limited ROM. 20: \"About the same as yesterday. \" Not sleeping well.     OBJECTIVE:   Observation:    Test measurements:  20: -4-85                                        20:  -2-92    RESTRICTIONS/PRECAUTIONS: Recent L TKA    Exercises/Interventions:     Therapeutic Ex (19236)   Min: Reps/Resistance Notes/CUES   Nu step 10 min    Leg press 75# , 2 x 20 6 holes showing   incline 3 x 30 sec    QSS 3 X 30 sec    Mini squats  20X    TKE 30 x each (2# on R)                   Mat ex     Strap assist  Knee flex 5\" 25 x Strap assist quad setting 10\" x 12     LAQ 2# 30 x          Seated chair assist knee flexion  30\" x 4          Manual Intervention (24040)  Min: 12 min    Knee mobs/PROM Knee flex and ext to tolerance     Tib/Fem Mobs     Patella Mobs Grade 1 sup/ inf    Ankle mobs               NMR re-education (96498)  Min:  CUES NEEDED             Therapeutic Activity (04438)  Min:               Modalities  Min:     IFC with      CP after exercises     MH after exercises            Other Therapeutic Activities: Pt was educated on PT POC, Diagnosis, Prognosis, pathomechanics as well as frequency and duration of scheduling future physical therapy appointments. Time was also taken on this day to answer all patient questions and participation in PT. Reviewed appointment policy in detail with patient and patient verbalized understanding. Home Exercise Program: HEP instruction: Access Code: [email or web address]   URL: [email or web address]   Date: 12/21/2020   Prepared by: Tremaine Course     Exercises   · Seated Knee Flexion Stretch - 4 reps - 1 sets - 30 hold - 2x daily - 7x weekly   The patient demonstrated good tolerance to and understanding of the HEP. Written instructions have been issued. Patient was instructed in the following for HEP:     . Patient verbalized/demonstrated understanding and was issued written handout. Therapeutic Exercise and NMR EXR  [x] (19870) Provided verbal/tactile cueing for activities related to strengthening, flexibility, endurance, ROM for improvements in LE, proximal hip, and core control with self care, mobility, lifting, ambulation.  [] (54368) Provided verbal/tactile cueing for activities related to improving balance, coordination, kinesthetic sense, posture, motor skill, proprioception  to assist with LE, proximal hip, and core control in self care, mobility, lifting, ambulation and eccentric single leg control.      NMR and Therapeutic Activities:    [] (30582 or 88099) Provided verbal/tactile cueing for activities related to improving balance, coordination, kinesthetic sense, posture, motor skill, proprioception and motor activation to allow for proper function of core, proximal hip and LE with self care and ADLs and functional mobility.   [] (76196) Gait Re-education- Provided training and instruction to the patient for proper LE, core and proximal hip recruitment and positioning and eccentric body weight control with ambulation re-education including up and down stairs     Home Exercise Program:    [x] (53638) Reviewed/Progressed HEP activities related to strengthening, flexibility, endurance, ROM of core, proximal hip and LE for functional self-care, mobility, lifting and ambulation/stair navigation   [] (33526)Reviewed/Progressed HEP activities related to improving balance, coordination, kinesthetic sense, posture, motor skill, proprioception of core, proximal hip and LE for self care, mobility, lifting, and ambulation/stair navigation      Manual Treatments:  PROM / STM / Oscillations-Mobs:  G-I, II, III, IV (PA's, Inf., Post.)  [x] (11616) Provided manual therapy to mobilize LE, proximal hip and/or LS spine soft tissue/joints for the purpose of modulating pain, promoting relaxation,  increasing ROM, reducing/eliminating soft tissue swelling/inflammation/restriction, improving soft tissue extensibility and allowing for proper ROM for normal function with self care, mobility, lifting and ambulation.      If [facility] Please Indicate Time In/Out  CPT Code Time in Time out                         Charges:  Timed Code Treatment Minutes: 50   Total Treatment Minutes: 50      [] EVAL (LOW) 46173 (typically 20 minutes face-to-face)  [] EVAL (MOD) 37453 (typically 30 minutes face-to-face)  [] EVAL (HIGH) 47274 (typically 45 minutes face-to-face)  [] RE-EVAL     [x] YC(54741) x   2  [] Dry needle 1 or 2 Muscles (18755)  [] NMR (67322) x     [] Dry needle 3+ Muscles (33606)  [x] Manual (10425) x   1  [] Ultrasound

## 2020-12-28 ENCOUNTER — HOSPITAL ENCOUNTER (OUTPATIENT)
Dept: PHYSICAL THERAPY | Age: 60
Setting detail: THERAPIES SERIES
Discharge: HOME | End: 2020-12-28
Payer: COMMERCIAL

## 2020-12-28 PROCEDURE — 97140 MANUAL THERAPY 1/> REGIONS: CPT

## 2020-12-28 PROCEDURE — 97110 THERAPEUTIC EXERCISES: CPT

## 2020-12-28 NOTE — FLOWSHEET NOTE
[facility] - Outpatient Rehabilitation & Therapy  [address] [facility]. [address] [place] [address]  Phone: [phone]   Fax:     [phone]      Physical Therapy Treatment Note/ Progress Report:     Date:  2020    Patient Name:  Adrienne Newberry  \"SCOTT\"  :  1960  MRN: [id number]    Pertinent Medical History:     Medical/Treatment Diagnosis Information:  · Diagnosis: Status post total left knee replacement (P87.378  · Treatment Diagnosis: Gait and mobility dysfuction s/p L TKA    Insurance/Certification information:  PT Insurance Information: [facility] #[id number] from 20 - 21    Physician Information:  Referring Practitioner: Dr. Bigg Kee of care signed (Y/N): routed 20     Date of Patient follow up with Physician:      Progress Report: []  Yes  [x]  No     Date Range for reporting period:  Beginnin2020  Ending:      Progress report due (10 Rx/or 30 days whichever is less):     Recertification due (POC duration/ or 90 days whichever is less):     Visit # POC/Insurance Allowable Auth Needed   4  [x]Yes   []No     Latex Allergy:  [x]NO      []YES  Preferred Language for Healthcare:   [x]English       []Other:    Functional Scale:      Date assessed: at eval  Test: [id number]  Score:    Pain level:  1/10 on R and 3/10 on L     History of Injury:    SUBJECTIVE:  See eval  20: Pt reports that his greatest problem is limited ROM. 20: \"About the same as yesterday. \" Not sleeping well.  20: Pt reports less pain but states that the left knee just feels restriction, \"like the inside of the knee and left side is locked and super glued. \" Lateral portion is numb,  OBJECTIVE:   Observation:    Test measurements:  20: -4-85                                        20:  -2-92                                        20: -4-101    RESTRICTIONS/PRECAUTIONS: Recent L TKA    Exercises/Interventions:     Therapeutic Ex (86684)   Min: Reps/Resistance Notes/CUES   Nu step 6 min    Leg press 90# , 2 x 20 6 holes showing   incline 3 x 30 sec    QSS 3 X 30 sec    Mini squats  20X    TKE 30 x each (2# on R)                   Mat ex     Strap assist  Knee flex 5\" 25 x    Strap assist quad setting 10\" x 12     LAQ 2# 30 x          Seated chair assist knee flexion  30\" x 4          Manual Intervention (32640)  Min: 12 min    Knee mobs/PROM Knee flex and ext to tolerance     Tib/Fem Mobs     Patella Mobs Grade 1 sup/ inf    Ankle mobs               NMR re-education (29537)  Min:  CUES NEEDED             Therapeutic Activity (36572)  Min:               Modalities  Min:     IFC with      CP after exercises     MH after exercises            Other Therapeutic Activities: Pt was educated on PT POC, Diagnosis, Prognosis, pathomechanics as well as frequency and duration of scheduling future physical therapy appointments. Time was also taken on this day to answer all patient questions and participation in PT. Reviewed appointment policy in detail with patient and patient verbalized understanding. Home Exercise Program: HEP instruction: Access Code: [email or web address]   URL: [email or web address]   Date: 12/21/2020   Prepared by: Avinash Nam     Exercises   · Seated Knee Flexion Stretch - 4 reps - 1 sets - 30 hold - 2x daily - 7x weekly   The patient demonstrated good tolerance to and understanding of the HEP. Written instructions have been issued. Patient was instructed in the following for HEP:     . Patient verbalized/demonstrated understanding and was issued written handout.       Therapeutic Exercise and NMR EXR  [x] (15463) Provided verbal/tactile cueing for activities related to strengthening, flexibility, endurance, ROM for improvements in LE, proximal hip, and core control with self care, mobility, lifting, ambulation.  [] (79861) Provided verbal/tactile cueing for activities related to Update:  [] Patient is progressing as expected towards functional goals listed. [] Progression is slowed due to complexities/Impairments listed. [] Progression has been slowed due to co-morbidities. [x] Plan just implemented, too soon to assess goals progression <30days   [] Goals require adjustment due to lack of progress  [] Patient is not progressing as expected and requires additional follow up with physician  [] Other    Prognosis for POC: [x] Good [] Fair  [] Poor    Patient requires continued skilled intervention: [x] Yes  [] No        PLAN: Continue as above   [x] Continue per plan of care [] Alter current plan (see comments)  [] Plan of care initiated [] Hold pending MD visit [] Discharge    Electronically signed by: Bianca Bunn PT    Note: If patient does not return for scheduled/recommended follow up visits, this note will serve as a discharge from care along with the most recent update on progress.

## 2020-12-30 ENCOUNTER — HOSPITAL ENCOUNTER (OUTPATIENT)
Dept: PHYSICAL THERAPY | Age: 60
Setting detail: THERAPIES SERIES
Discharge: HOME | End: 2020-12-30
Payer: COMMERCIAL

## 2020-12-30 PROCEDURE — 97110 THERAPEUTIC EXERCISES: CPT

## 2020-12-30 PROCEDURE — 97140 MANUAL THERAPY 1/> REGIONS: CPT

## 2020-12-30 NOTE — FLOWSHEET NOTE
[facility] - Outpatient Rehabilitation & Therapy  [address] [facility]. [place] [address]  Phone: [phone]   Fax:     [phone]      Physical Therapy Treatment Note/ Progress Report:     Date:  2020    Patient Name:  Bishnu Watson  \"SCOTT\"  :  1960  MRN: [id number]    Pertinent Medical History:     Medical/Treatment Diagnosis Information:  · Diagnosis: Status post total left knee replacement (H92.403  · Treatment Diagnosis: Gait and mobility dysfuction s/p L TKA    Insurance/Certification information:  PT Insurance Information: [employer] #[id number] from 20 - 21    Physician Information:  Referring Practitioner: Dr. Melany Corral of care signed (Y/N): routed 20     Date of Patient follow up with Physician:      Progress Report: []  Yes  [x]  No     Date Range for reporting period:  Beginnin2020  Ending:      Progress report due (10 Rx/or 30 days whichever is less):     Recertification due (POC duration/ or 90 days whichever is less):     Visit # POC/Insurance Allowable Auth Needed   5  [x]Yes   []No     Latex Allergy:  [x]NO      []YES  Preferred Language for Healthcare:   [x]English       []Other:    Functional Scale:      Date assessed: at eval  Test: VPLF10  Score:    Pain level:  1/10 on R and 3/10 on L     History of Injury:    SUBJECTIVE:  See eval  20: Pt reports that his greatest problem is limited ROM. 20: \"About the same as yesterday. \" Not sleeping well.  20: Pt reports less pain but states that the left knee just feels restriction, \"like the inside of the knee and left side is locked and super glued. \" Lateral portion is numb,  20: \"Sore after treatment on Monday. Scar tissue is still so tight. \" Still numbness on lateral knee.      OBJECTIVE:   Observation:    Test measurements:  20: -20:  -2   12/28/20: -4-101                                         12/30/20: 0-102    RESTRICTIONS/PRECAUTIONS: Recent L TKA    Exercises/Interventions:     Therapeutic Ex (90926)   Min: Reps/Resistance Notes/CUES   Nu step 6 min    Leg press 105# , 2 x 20 6 holes showing   incline 3 x 30 sec    QSS 3 X 30 sec    Mini squats  20X    TKE 30 x each (2# on R)                   Mat ex     Strap assist  Knee flex 5\" 25 x    Strap assist quad setting 10\" x 12     LAQ 2# 30 x          Seated chair assist knee flexion  30\" x 4          Manual Intervention (52631)  Min: 10 min    Knee mobs/PROM Knee flex and ext to tolerance     Tib/Fem Mobs     Patella Mobs Grade 1 sup/ inf    Ankle mobs     IASTM to lateral aspect of left knee 5 min    Medical cupping to lateral left knee 5 min    NMR re-education (24286)  Min:  CUES NEEDED             Therapeutic Activity (77613)  Min:               Modalities  Min:     IFC with      CP after exercises     MH after exercises            Other Therapeutic Activities: Pt was educated on PT POC, Diagnosis, Prognosis, pathomechanics as well as frequency and duration of scheduling future physical therapy appointments. Time was also taken on this day to answer all patient questions and participation in PT. Reviewed appointment policy in detail with patient and patient verbalized understanding. Home Exercise Program: HEP instruction: Access Code: [email or web address]   URL: [email or web address]   Date: 12/21/2020   Prepared by: Dick Antony     Exercises   · Seated Knee Flexion Stretch - 4 reps - 1 sets - 30 hold - 2x daily - 7x weekly   The patient demonstrated good tolerance to and understanding of the HEP. Written instructions have been issued. Patient was instructed in the following for HEP:     . Patient verbalized/demonstrated understanding and was issued written handout.       Therapeutic Exercise and NMR EXR  [x] (74974) Provided verbal/tactile cueing for activities related to strengthening, flexibility, endurance, ROM for improvements in LE, proximal hip, and core control with self care, mobility, lifting, ambulation.  [] (91014) Provided verbal/tactile cueing for activities related to improving balance, coordination, kinesthetic sense, posture, motor skill, proprioception  to assist with LE, proximal hip, and core control in self care, mobility, lifting, ambulation and eccentric single leg control.      NMR and Therapeutic Activities:    [] (18486 or 87820) Provided verbal/tactile cueing for activities related to improving balance, coordination, kinesthetic sense, posture, motor skill, proprioception and motor activation to allow for proper function of core, proximal hip and LE with self care and ADLs and functional mobility.   [] (61502) Gait Re-education- Provided training and instruction to the patient for proper LE, core and proximal hip recruitment and positioning and eccentric body weight control with ambulation re-education including up and down stairs     Home Exercise Program:    [x] (45707) Reviewed/Progressed HEP activities related to strengthening, flexibility, endurance, ROM of core, proximal hip and LE for functional self-care, mobility, lifting and ambulation/stair navigation   [] (55383)Reviewed/Progressed HEP activities related to improving balance, coordination, kinesthetic sense, posture, motor skill, proprioception of core, proximal hip and LE for self care, mobility, lifting, and ambulation/stair navigation      Manual Treatments:  PROM / STM / Oscillations-Mobs:  G-I, II, III, IV (PA's, Inf., Post.)  [x] (53171) Provided manual therapy to mobilize LE, proximal hip and/or LS spine soft tissue/joints for the purpose of modulating pain, promoting relaxation,  increasing ROM, reducing/eliminating soft tissue swelling/inflammation/restriction, improving soft tissue extensibility and allowing for proper ROM for normal function with self care, mobility, lifting and ambulation. If [facility] Please Indicate Time In/Out  CPT Code Time in Time out                         Charges:  Timed Code Treatment Minutes: 50   Total Treatment Minutes: 50      [] EVAL (LOW) 11816 (typically 20 minutes face-to-face)  [] EVAL (MOD) 09557 (typically 30 minutes face-to-face)  [] EVAL (HIGH) 09005 (typically 45 minutes face-to-face)  [] RE-EVAL     [x] TV(15003) x   2  [] Dry needle 1 or 2 Muscles (84633)  [] NMR (20413) x     [] Dry needle 3+ Muscles (38872)  [x] Manual (93744) x   1  [] Ultrasound (97568) x  [] TA (16074) x     [] Mech Traction (46903)  [] ES(attended) (45920)     [] ES (un) (46718):   [] Vasopump (91392) [] Ionto (87436)   [] Other:    GOALS:Short Term Goals: To be achieved in: 2 weeks  1. Independent in HEP and progression per patient tolerance, in order to prevent re-injury. [x]? Progressing: []? Met: []? Not Met: []? Adjusted  2. Patient will have a decrease in pain to facilitate improvement in movement, function, and ADLs as indicated by Functional Deficits. []? Progressing: []? Met: []? Not Met: []? Adjusted     Long Term Goals: To be achieved in: 6 weeks  1. Disability index score of 42 or better for the LEFS to assist with reaching prior level of function. [x]? Progressing: []? Met: []? Not Met: []? Adjusted  2. Patient will demonstrate increased AROM to 3-110 to allow for proper joint functioning as indicated by patients Functional Deficits. [x]? Progressing: []? Met: []? Not Met: []? Adjusted  3. Patient will demonstrate an increase in Strength to good proximal hip strength and control, within 5lb HHD in LE to allow for proper functional mobility as indicated by patients Functional Deficits. [x]? Progressing: []? Met: []? Not Met: []? Adjusted  4. Pt will ambulate a community level without a device, ascend/ descend stirs taking reciprocal steps   [x]? Progressing: []? Met: []? Not Met: []?  Adjusted     ASSESSMENT:  See eval    Treatment/Activity Tolerance:  [x] Patient tolerated treatment well [] Patient limited by fatique  [] Patient limited by pain  [] Patient limited by other medical complications  [] Other:     Overall Progression Towards Functional goals/ Treatment Progress Update:  [] Patient is progressing as expected towards functional goals listed. [] Progression is slowed due to complexities/Impairments listed. [] Progression has been slowed due to co-morbidities. [x] Plan just implemented, too soon to assess goals progression <30days   [] Goals require adjustment due to lack of progress  [] Patient is not progressing as expected and requires additional follow up with physician  [] Other    Prognosis for POC: [x] Good [] Fair  [] Poor    Patient requires continued skilled intervention: [x] Yes  [] No        PLAN: Continue as above. One more session , then DC. [x] Continue per plan of care [] Alter current plan (see comments)  [] Plan of care initiated [] Hold pending MD visit [] Discharge    Electronically signed by: Adrien Rivera, PT    Note: If patient does not return for scheduled/recommended follow up visits, this note will serve as a discharge from care along with the most recent update on progress.

## 2020-12-31 ENCOUNTER — HOSPITAL ENCOUNTER (OUTPATIENT)
Dept: PHYSICAL THERAPY | Age: 60
Setting detail: THERAPIES SERIES
Discharge: HOME | End: 2020-12-31
Payer: COMMERCIAL

## 2020-12-31 PROCEDURE — 97140 MANUAL THERAPY 1/> REGIONS: CPT

## 2020-12-31 PROCEDURE — 97110 THERAPEUTIC EXERCISES: CPT

## 2020-12-31 NOTE — FLOWSHEET NOTE
[facility] - Outpatient Rehabilitation & Therapy  [address]. [place]re[place]  Phone: [phone]   Fax:     [phone]      Physical Therapy Treatment Note/ Progress Report:     Date:  2020    Patient Name:  Muna Mendenhall  \"SCOTT\"  :  1960  MRN: [id number]    Pertinent Medical History:     Medical/Treatment Diagnosis Information:  · Diagnosis: Status post total left knee replacement (E28.390  · Treatment Diagnosis: Gait and mobility dysfuction s/p L TKA    Insurance/Certification information:  PT Insurance Information: Randi Lucas #[id number] from 20 - 21    Physician Information:  Referring Practitioner: Dr. Nix Head of care signed (Y/N): routed 20     Date of Patient follow up with Physician:      Progress Report: []  Yes  [x]  No     Date Range for reporting period:  Beginnin2020  Ending:      Progress report due (10 Rx/or 30 days whichever is less):     Recertification due (POC duration/ or 90 days whichever is less):     Visit # POC/Insurance Allowable Auth Needed   6  [x]Yes   []No     Latex Allergy:  [x]NO      []YES  Preferred Language for Healthcare:   [x]English       []Other:    Functional Scale:      Date assessed: at eval  Test: [id number]  Score:    Pain level:  1/10 on R and 3/10 on L     History of Injury:    SUBJECTIVE:  See eval  20: Pt reports that his greatest problem is limited ROM. 20: \"About the same as yesterday. \" Not sleeping well.  20: Pt reports less pain but states that the left knee just feels restriction, \"like the inside of the knee and left side is locked and super glued. \" Lateral portion is numb,  20: \"Sore after treatment on Monday. Scar tissue is still so tight. \" Still numbness on lateral knee. 20 Pt reports no significant change with his pain.      OBJECTIVE:   Observation:    Test measurements:  20: -4-   12/24/20:  -2-92                                        12/28/20: -4-101                                         12/30/20: 0-102                                          12/31/20  2-101    RESTRICTIONS/PRECAUTIONS: Recent L TKA    Exercises/Interventions:     Therapeutic Ex (27194)   Min: Reps/Resistance Notes/CUES   Nu step 6 min    Leg press 105# , 2 x 20 6 holes showing   incline 3 x 30 sec    QSS 3 X 30 sec    Mini squats  20X    TKE 30 x each (2# on R)                   Mat ex     Strap assist  Knee flex 5\" 25 x    Strap assist quad setting 10\" x 12     LAQ 3# 30 x          Seated chair assist knee flexion  30\" x 4          Manual Intervention (30393)  Min: 10 min    Knee mobs/PROM Knee flex and ext to tolerance     Tib/Fem Mobs     Patella Mobs Grade 1 sup/ inf    Ankle mobs     IASTM to lateral aspect of left knee 5 min    Medical cupping to lateral left knee 5 min    NMR re-education (07975)  Min:  CUES NEEDED             Therapeutic Activity (69794)  Min:               Modalities  Min:     IFC with      CP after exercises     MH after exercises            Other Therapeutic Activities: Pt was educated on PT POC, Diagnosis, Prognosis, pathomechanics as well as frequency and duration of scheduling future physical therapy appointments. Time was also taken on this day to answer all patient questions and participation in PT. Reviewed appointment policy in detail with patient and patient verbalized understanding. Home Exercise Program: HEP instruction: Access Code: [email or web address]   URL: [email or web address]   Date: 12/21/2020   Prepared by: Aura Patel     Exercises   · Seated Knee Flexion Stretch - 4 reps - 1 sets - 30 hold - 2x daily - 7x weekly   Access Code: [facility]   URL: [email or web address]   Date: 12/31/2020   Prepared by: Aura Patel     Exercises   Alternating Single Leg Balance - Foot Behind - 10 reps - 2 sets - 10 hold - 1x daily - 3x weekly Squat - 15 reps - 2 sets - 5 hold - 1x daily - 3x weekly   Lateral Step Up - 15 reps - 2 sets - 3 hold - 1x daily - 3x weekly   Step Up - 10 reps - 2 sets - 3 hold - 1x daily - 3x weekly     The patient demonstrated good tolerance to and understanding of the HEP. Written instructions have been issued. Patient was instructed in the following for HEP:     . Patient verbalized/demonstrated understanding and was issued written handout. Therapeutic Exercise and NMR EXR  [x] (98314) Provided verbal/tactile cueing for activities related to strengthening, flexibility, endurance, ROM for improvements in LE, proximal hip, and core control with self care, mobility, lifting, ambulation.  [] (49153) Provided verbal/tactile cueing for activities related to improving balance, coordination, kinesthetic sense, posture, motor skill, proprioception  to assist with LE, proximal hip, and core control in self care, mobility, lifting, ambulation and eccentric single leg control.      NMR and Therapeutic Activities:    [] (03395 or 70576) Provided verbal/tactile cueing for activities related to improving balance, coordination, kinesthetic sense, posture, motor skill, proprioception and motor activation to allow for proper function of core, proximal hip and LE with self care and ADLs and functional mobility.   [] (59865) Gait Re-education- Provided training and instruction to the patient for proper LE, core and proximal hip recruitment and positioning and eccentric body weight control with ambulation re-education including up and down stairs     Home Exercise Program:    [x] (98667) Reviewed/Progressed HEP activities related to strengthening, flexibility, endurance, ROM of core, proximal hip and LE for functional self-care, mobility, lifting and ambulation/stair navigation   [] (82294)Reviewed/Progressed HEP activities related to improving balance, coordination, kinesthetic sense, posture, motor skill, proprioception of core, proximal hip and LE for self care, mobility, lifting, and ambulation/stair navigation      Manual Treatments:  PROM / STM / Oscillations-Mobs:  G-I, II, III, IV (PA's, Inf., Post.)  [x] (03894) Provided manual therapy to mobilize LE, proximal hip and/or LS spine soft tissue/joints for the purpose of modulating pain, promoting relaxation,  increasing ROM, reducing/eliminating soft tissue swelling/inflammation/restriction, improving soft tissue extensibility and allowing for proper ROM for normal function with self care, mobility, lifting and ambulation. If [facility] Please Indicate Time In/Out  CPT Code Time in Time out                         Charges:  Timed Code Treatment Minutes: 52   Total Treatment Minutes: 55      [] EVAL (LOW) 32954 (typically 20 minutes face-to-face)  [] EVAL (MOD) 69055 (typically 30 minutes face-to-face)  [] EVAL (HIGH) 13185 (typically 45 minutes face-to-face)  [] RE-EVAL     [x] RH(41721) x   2  [] Dry needle 1 or 2 Muscles (35183)  [] NMR (58653) x     [] Dry needle 3+ Muscles (34093)  [x] Manual (92741) x   1  [] Ultrasound (35224) x  [] TA (71315) x     [] Mech Traction (63885)  [] ES(attended) (29873)     [] ES (un) (39243):   [] Vasopump (84774) [] Ionto (94743)   [] Other:    GOALS:Short Term Goals: To be achieved in: 2 weeks  1. Independent in HEP and progression per patient tolerance, in order to prevent re-injury. [x]? Progressing: [x]? Met: []? Not Met: []? Adjusted  2. Patient will have a decrease in pain to facilitate improvement in movement, function, and ADLs as indicated by Functional Deficits. []? Progressing: []? Met: [x]? Not Met: []? Adjusted     Long Term Goals: To be achieved in: 6 weeks  1. Disability index score of 42 or better for the LEFS to assist with reaching prior level of function. [x]? Progressing: []? Met: []? Not Met: []? Adjusted  2.  Patient will demonstrate increased AROM to 3-110 to allow for proper joint functioning as indicated by patients Functional

## 2020-12-31 NOTE — PROGRESS NOTES
Physical Therapy Discharge Note  Date: 2020    Patient Name: Raya Olivas  : 1960  MRN: [id number]  Medical/Treatment Diagnosis Information:  · Diagnosis: Status post total left knee replacement (W86.895  · Treatment Diagnosis: Gait and mobility dysfuction s/p L TKA     Insurance/Certification information:  PT Insurance Information: Ezekiel Patrick #[id number] from 20 - 21     Physician Information:  Referring Practitioner: Dr. Susanne Chaparro      Dates of Service:20 to 20   Total # of Visits:6  Cancel/No Shows to date:0    Medicare Cap Total for 2020:    Functional Outcomes Measures: at discharge  Test:  Score:    Treatment to Date:  [x] Therapeutic Exercise  [] Modalities:  [x] Therapeutic Activity     [] Ultrasound  [] Electrical Stim   [x] Gait Training      [] Cervical Traction    [] Lumbar Traction  [x] Neuromuscular Re-education  [] Cold/hotpack [] Iontophoresis  [x] Instruction in HEP      Other:  [x] Manual Therapy       []    [] Aquatic Therapy       []                            Significant Findings At Last Visit:    Subjective:20 Pt reports no significant change with his pain. Objective:   Observation: Pt now walks without an assistive device on level surfaces.    · Test measurements:  20: -4-85  ·                                      20:  -2-92  ·                                      20: -4-101  ·                                       20: 0-102                                          20  2-101     Overall Response to Treatment:  [] Patient responded well to treatment and improvement is noted with regards to functional goals              []Patient should continue to improve in reasonable time if they continue HEP              []Patient has plateaued and is no longer responding to skilled PT intervention                        []Patient is getting worse and would benefit from return to referring MD              [x]Patient unable to adhere to initial POC              [x]Other:Pt needs to return to his work and can no longer continue with PT. Goals:GOALS:Short Term Goals: To be achieved in: 2 weeks  1. Independent in HEP and progression per patient tolerance, in order to prevent re-injury. [x]? ? Progressing: [x]? ? Met: []?? Not Met: []?? Adjusted  2. Patient will have a decrease in pain to facilitate improvement in movement, function, and ADLs as indicated by Functional Deficits. []?? Progressing: []?? Met: [x]? ? Not Met: []?? Adjusted     Long Term Goals: To be achieved in: 6 weeks  1. Disability index score of 42 or better for the LEFS to assist with reaching prior level of function. [x]? ? Progressing: []?? Met: [x]? ? Not Met: []?? Adjusted  2. Patient will demonstrate increased AROM to 3-110 to allow for proper joint functioning as indicated by patients Functional Deficits. [x]? ? Progressing: []?? Met: [x]? ? Not Met: []?? Adjusted  3. Patient will demonstrate an increase in Strength to good proximal hip strength and control, within 5lb HHD in LE to allow for proper functional mobility as indicated by patients Functional Deficits. [x]? ? Progressing: []?? Met: [x]? ? Not Met: []?? Adjusted  4. Pt will ambulate a community level without a device, ascend/ descend stirs taking reciprocal steps   [x]? ? Progressing: []?? Met: [x]? ? Not Met: []?? Adjusted     Goal Status:  [] Achieved [x] Partially Achieved  [x] Not Achieved     Reason for Discharge:  [] Goals Met   [] Patient did not return after initial evaluation   [] Progress Plateaued [] Missed _____ scheduled appointments   [] No insurance coverage [x] Patient terminated therapy   [] Other:        PT recommendation:   [x] Patient now discharged with HEP      [] Other:    Discharge discussed with:  [x] Patient  [] Caregiver       [] Other        Electronically signed by:  Kevin Keller, PT    If you have any questions or concerns, please don't hesitate to call.   Thank you for your referral.

## 2021-01-07 ENCOUNTER — OFFICE VISIT (OUTPATIENT)
Dept: ORTHOPEDIC SURGERY | Age: 61
End: 2021-01-07

## 2021-01-07 VITALS — WEIGHT: 220 LBS | HEIGHT: 74 IN | TEMPERATURE: 97.1 F | BODY MASS INDEX: 28.23 KG/M2

## 2021-01-07 DIAGNOSIS — Z96.652 STATUS POST TOTAL LEFT KNEE REPLACEMENT: Primary | ICD-10-CM

## 2021-01-07 PROCEDURE — 99024 POSTOP FOLLOW-UP VISIT: CPT | Performed by: ORTHOPAEDIC SURGERY

## 2021-01-07 NOTE — LETTER
[facility]  Surgery Scheduling Form:  [facility]    DEMOGRAPHICS:                                                                                                                  Patient Name:  Raya Olivas  Patient :  1960   Patient SS#:      Patient Phone:  [phone] (home) [phone] (work)                              Patient Address:  [address] [place] [address]    PCP:  LISHA Evans CNP  Insurance:   Payor/Plan Subscr  Sex Relation Sub. Ins. ID Effective Group Num   1. Dong Pedro 1960 Male Self [id number]                                    [id number]     DIAGNOSIS & PROCEDURE:                                                                                              Diagnosis:   Left arthofibrosis knee     S55.920  Operation:  Left knee manipulation     [address]308  Location:  [facility]  Surgeon:  Hailey Williamson MD    [facility] INFORMATION:                                                                                         .  Surgeon's Scheduling Instruction:  elective    Requested Date:    OR Time:  8:15 Patient Arrival Time:  7:00    OR Time Required:  15  Minutes  Anesthesia:  General  Equipment:    Mini C-Arm:     Standard C-Arm:    Status:  Outpatient                     COVID:    1-8  PAT Required:  No  Comments:   ALLERGIES:Patient has no known allergies.                         Aleta Franco MD      21 1:15 PM  BILLING INFORMATION:                                                                                                       Procedure:       CPT Code Modifier  With Helena Baltazar [facility]        Pre Operative Physician Prophylaxis Orders - SCIP Protocols      Patient: Raya Olivas  :    1960        Procedure:  Left knee arthrofibrosis    Pre-Operative Antibiotic Order:         [x]  ----  No Antibiotic Ordered []  ----  Give the following Antibiotic within 1 hour prior to start time:         Ancef 1 gram IV if patient is less than 200 pounds    or       Ancef 2 grams IV if patient is greater than 200 pounds    or     All patients will receive preop Cefazolin 2 GM or wt based             SURG   1-13                      COVID 1-8                      H&P   PCP__XX__       Physician Signature:     Date:   Time:    [unfilled]  1/7/21

## 2021-01-08 ENCOUNTER — TELEPHONE (OUTPATIENT)
Dept: ORTHOPEDIC SURGERY | Age: 61
End: 2021-01-08

## 2021-01-08 ENCOUNTER — OFFICE VISIT (OUTPATIENT)
Dept: INTERNAL MEDICINE CLINIC | Age: 61
End: 2021-01-08
Payer: COMMERCIAL

## 2021-01-08 ENCOUNTER — OFFICE VISIT (OUTPATIENT)
Dept: PRIMARY CARE CLINIC | Age: 61
End: 2021-01-08
Payer: COMMERCIAL

## 2021-01-08 VITALS
RESPIRATION RATE: 16 BRPM | BODY MASS INDEX: 28.36 KG/M2 | HEIGHT: 74 IN | HEART RATE: 82 BPM | DIASTOLIC BLOOD PRESSURE: 78 MMHG | WEIGHT: 221 LBS | OXYGEN SATURATION: 98 % | TEMPERATURE: 96.6 F | SYSTOLIC BLOOD PRESSURE: 118 MMHG

## 2021-01-08 DIAGNOSIS — Z01.818 PRE-OP EXAMINATION: Primary | ICD-10-CM

## 2021-01-08 DIAGNOSIS — Z01.812 PRE-PROCEDURAL LABORATORY EXAMINATIONS: Primary | ICD-10-CM

## 2021-01-08 PROCEDURE — 99211 OFF/OP EST MAY X REQ PHY/QHP: CPT | Performed by: NURSE PRACTITIONER

## 2021-01-08 PROCEDURE — 99213 OFFICE O/P EST LOW 20 MIN: CPT | Performed by: NURSE PRACTITIONER

## 2021-01-08 ASSESSMENT — PATIENT HEALTH QUESTIONNAIRE - PHQ9
SUM OF ALL RESPONSES TO PHQ QUESTIONS 1-9: 0
2. FEELING DOWN, DEPRESSED OR HOPELESS: 0

## 2021-01-08 NOTE — PROGRESS NOTES
[facility]  Office Visit    Chief Complaint: Follow-up s/p left total knee arthroplasty    HPI:  Willy Moreno is a 61 y.o. who is here in follow-up of left total knee arthroplasty performed on December 1, 2020. He has completed formal physical therapy. He is taking Aleve as needed for pain and is off of narcotic medications. He rates the pain as 4/10. He reports that he continues to struggle with range of motion. Patient Active Problem List   Diagnosis    Arthritis of left knee    Status post total knee replacement, right       ROS:  Constitutional: denies fever, chills, weight loss  MSK: denies pain in other joints, muscle aches  Neurological: denies numbness, tingling, weakness    Medications:  No current outpatient medications on file prior to visit. No current facility-administered medications on file prior to visit. Exam:  Temperature 97.1 °F (36.2 °C), height 6' 2\" (1.88 m), weight 220 lb (99.8 kg). Appearance: sitting in exam room chair, appears to be in no acute distress, awake and alert  Resp: unlabored breathing on room air  Skin: warm, dry and intact with out erythema or significant increased temperature  Neuro: grossly intact both lower extremities. Intact sensation to light touch. Motor exam 4+ to 5/5 in all major motor groups. MSK: LLE - Incision is well-healed. Range of motion is 10 to 90 degrees. Sensation is intact light touch. There is brisk capillary refill. Dorsalis pedis and posterior tibial pulses are intact. There is 5/5 muscle strength in all muscle groups.     Imaging:  None    Assessment:  S/p left total knee arthroplasty with arthrofibrosis    Plan: We discussed the treatment options. I recommend left knee manipulation under anesthesia to help regain his flexion. We did discuss that this will not necessarily help him regain full extension. Physical therapy will also be renewed after his manipulation. I also recommended he resume narcotic pain medication usage around the time of physical therapy after manipulation to help him regain and keep his motion. All risks, benefits, potential complications of the proposed procedure were explained and discussed which include but are not limited to persistent pain, stiffness, intraprocedure fracture, cardiopulmonary complications, DVT, pulmonary embolism, and side effects from anesthesia. This dictation was done with Dragon dictation and may contain mechanical errors related to translation.

## 2021-01-08 NOTE — PROGRESS NOTES
Karen Ash  YOB: 1960    @DOS@    Vitals:    01/08/21 0809   BP: 118/78   Site: Left Upper Arm   Position: Sitting   Cuff Size: Large Adult   Pulse: 82   Resp: 16   Temp: 96.6 °F (35.9 °C)   SpO2: 98%   Weight: 221 lb (100.2 kg)   Height: 6' 2\" (1.88 m)      Wt Readings from Last 2 Encounters:   01/08/21 221 lb (100.2 kg)   01/07/21 220 lb (99.8 kg)     BP Readings from Last 3 Encounters:   01/08/21 118/78   12/01/20 107/66   12/01/20 122/75        Chief Complaint   Patient presents with   Jose Gaines Pre-op Exam     left knee      No Known Allergies  Current Outpatient Medications   Medication Sig Dispense Refill    Naproxen Sodium (ALEVE PO) Take by mouth       No current facility-administered medications for this visit. This patient presents to the office today for a preoperative consultation at the request of surgeon, Dr Deanna Libman  who plans on performing LEFT KNEE MANIPULATION  on January 13 for left knee decrease rom . The current problem began 1 months ago and is unchanged. Conservative therapy, including PT, has failed. Planned anesthesia is General.  The patient has the following known anesthesia issues: none . Patient has a bleeding risk of : no remote history of abnormal bleeding.       Patient Active Problem List   Diagnosis    Arthritis of left knee    Status post total knee replacement, right       Past Medical History:   Diagnosis Date    Arthritis     Lab test positive for detection of COVID-19 virus     9/2020     Past Surgical History:   Procedure Laterality Date    HERNIA REPAIR Right     inguinal hernia X 2    KNEE ARTHROSCOPY Left     KNEE ARTHROSCOPY Right     TOTAL KNEE ARTHROPLASTY Right 11/4/2020    ROBOTIC ASSISTED RIGHT TOTAL KNEE REPLACEMENT performed by Hammad Jackson MD at [address] [place] [address] Bilateral 12/1/2020 LEFT TOTAL KNEE REPLACEMENT ROBOTIC ASSISTED, AND MANIPULATION RIGHT KNEE performed by Elvin Sears MD at [facility] History   Problem Relation Age of Onset    Arthritis Mother     Heart Disease Father     Pacemaker Father     Kidney Disease Father     No Known Problems Sister     No Known Problems Brother     No Known Problems Maternal Aunt     No Known Problems Maternal Uncle     No Known Problems Paternal Aunt     No Known Problems Paternal Uncle     No Known Problems Maternal Grandmother     No Known Problems Maternal Grandfather     No Known Problems Paternal Grandmother     No Known Problems Paternal Grandfather     No Known Problems Other     Anesth Problems Neg Hx     Broken Bones Neg Hx     Cancer Neg Hx     Clotting Disorder Neg Hx     Collagen Disease Neg Hx     Diabetes Neg Hx     Dislocations Neg Hx     Osteoporosis Neg Hx     Rheumatologic Disease Neg Hx     Scoliosis Neg Hx     Severe Sprains Neg Hx      Social History     Socioeconomic History    Marital status:      Spouse name: Not on file    Number of children: Not on file    Years of education: Not on file    Highest education level: Not on file   Occupational History    Occupation: outside    Social Needs    Financial resource strain: Not on file    Food insecurity     Worry: Not on file     Inability: Not on file   [employer] needs     Medical: Not on file     Non-medical: Not on file   Tobacco Use    Smoking status: Current Some Day Smoker     Packs/day: 0.00     Years: 15.00     Pack years: 0.00     Types: Cigars    Smokeless tobacco: Never Used    Tobacco comment: rare   Substance and Sexual Activity    Alcohol use: Yes     Comment:  Occ     Drug use: Never    Sexual activity: Yes     Partners: Female   Lifestyle    Physical activity     Days per week: Not on file     Minutes per session: Not on file    Stress: Not on file   Relationships    Social connections Talks on phone: Not on file     Gets together: Not on file     Attends [employer] service: Not on file     Active member of club or organization: Not on file     Attends meetings of clubs or organizations: Not on file     Relationship status: Not on file    Intimate partner violence     Fear of current or ex partner: Not on file     Emotionally abused: Not on file     Physically abused: Not on file     Forced sexual activity: Not on file   Other Topics Concern    Not on file   Social History Narrative    Not on file       Review of Systems  Pertinent items are noted in HPI. Physical Exam   Constitutional: Patient is oriented to person, place, and time. He appears well-developed and well-nourished. No distress. HEENT:   Head: Normocephalic and atraumatic. Right Ear: Tympanic membrane, external ear and ear canal normal.   Left Ear: Tympanic membrane, external ear and ear canal normal.   Nose: Nose normal.   Mouth/Throat: Oropharynx is clear and moist, and mucous membranes are normal.  There is no cervical adenopathy. There are no loose teeth. Eyes: Conjunctivae and extraocular motions are normal. Pupils are equal, round, and reactive to light bilaterally. Neck: Neck supple. No JVD present. Carotid bruit is not present. No mass and no thyromegaly present. Cardiovascular: Normal rate, regular rhythm, normal heart sounds and intact distal pulses. Exam reveals no gallop and no friction rub. No murmur heard. Pulmonary/Chest: Effort normal and breath sounds normal. No respiratory distress. There are no wheezes, rhonchi or rales. Abdominal: Soft, non-tender. Normal bowel sounds and aorta. There is no organomegaly, bruit or palpable mass. Neurological: He is alert and oriented to person, place, and time. He has normal reflexes. No cranial nerve deficit. Coordination normal.   Skin: Skin is warm and dry. There is no rash or erythema. No suspicious lesions noted. Psychiatric: He has a normal mood and affect. Speech and behavior are normal. Judgment, cognition and memory are normal.     EKG Interpretation: EKG - 10/7/20: Sinus bradycardia Otherwise normal    Lab Review   Admission on 12/01/2020, Discharged on 12/01/2020   Component Date Value    ABO/Rh 12/01/2020 A POS     Antibody Screen 12/01/2020 NEG     POC Glucose 12/01/2020 98     Performed on 12/01/2020 [facility] Outpatient Visit on 11/20/2020   Component Date Value    MRSA SCREEN RT-PCR 11/20/2020                      Value:Negative  MRSA DNA not detected.   Normal Range: Not detected      Alb 11/20/2020 4.2     aPTT 11/20/2020 32.2     Sodium 11/20/2020 138     Potassium 11/20/2020 4.2     Chloride 11/20/2020 103     CO2 11/20/2020 25     Anion Gap 11/20/2020 10     Glucose 11/20/2020 93     BUN 11/20/2020 18     CREATININE 11/20/2020 0.8*    GFR Non- 11/20/2020 >60     GFR  11/20/2020 >60     Calcium 11/20/2020 9.0     WBC 11/20/2020 5.7     RBC 11/20/2020 4.38     Hemoglobin 11/20/2020 13.2*    Hematocrit 11/20/2020 39.2*    MCV 11/20/2020 89.5     MCH 11/20/2020 30.2     MCHC 11/20/2020 33.7     RDW 11/20/2020 13.1     Platelets 20/30/9701 351     MPV 11/20/2020 8.5     Neutrophils % 11/20/2020 62.0     Lymphocytes % 11/20/2020 22.5     Monocytes % 11/20/2020 10.9     Eosinophils % 11/20/2020 3.7     Basophils % 11/20/2020 0.9     Neutrophils Absolute 11/20/2020 3.5     Lymphocytes Absolute 11/20/2020 1.3     Monocytes Absolute 11/20/2020 0.6     Eosinophils Absolute 11/20/2020 0.2     Basophils Absolute 11/20/2020 0.0     Hemoglobin A1C 11/20/2020 5.3     eAG 11/20/2020 105.4     Vit D, 25-Hydroxy 11/20/2020 25.7*    Color, UA 11/20/2020 YELLOW     Clarity, UA 11/20/2020 Clear     Glucose, Ur 11/20/2020 Negative     Bilirubin Urine 11/20/2020 Negative     Ketones, Urine 11/20/2020 Negative  Specific [place], UA 11/20/2020 1.027     Blood, Urine 11/20/2020 Negative     pH, UA 11/20/2020 5.0     Protein, UA 11/20/2020 Negative     Urobilinogen, Urine 11/20/2020 0.2     Nitrite, Urine 11/20/2020 Negative     Leukocyte Esterase, Urine 11/20/2020 Negative     Microscopic Examination 11/20/2020 Not Indicated     Urine Type 11/20/2020 NotGiven     Urine Reflex to Culture 11/20/2020 Not Indicated     Sed Rate 11/20/2020 15     Protime 11/20/2020 12.5     INR 11/20/2020 1.08     Prealbumin 11/20/2020 25.7     ABO/Rh 11/20/2020 A POS     Antibody Screen 11/20/2020 NEG            Assessment:        61 y.o. patient with planned surgery as above. Known risk factors for perioperative complications: None    [place] Risk Stratification for noncardiac surgery:   Class: 1  Risk: low     Current medications which may produce withdrawal symptoms if withheld perioperatively: none      Plan:     1. Preoperative workup as follows: none. 2. Change in medication regimen before surgery: discontinue ASA 14d before surgery, discontinue NSAIDs  14d before surgery.       Cleared for planned surgery

## 2021-01-08 NOTE — PROGRESS NOTES
Patient presented to [facility] drive up clinic for preop testing. Patient was swabbed and given information advising them to remain isolated until procedure date.

## 2021-01-09 LAB — SARS-COV-2: NOT DETECTED

## 2021-01-11 NOTE — PROGRESS NOTES
C-Difficile admission screening and protocol:     * Admitted with diarrhea? n     *Prior history of C-Diff. In last 3 months?n     *Antibiotic use in the past 6-8 weeks?n     *Prior hospitalization or nursing home in the last month?n        [address] time_7am     Surgery time____________    Take the following medications with a sip of water:    Do not eat or drink anything after 12:00 midnight prior to your surgery. This includes water chewing gum, mints and ice chips. You may brush your teeth and gargle the morning of your surgery, but do not swallow the water     Please see your family doctor/pediatrician for a history and physical and/or concerning medications. Bring any test results/reports from your physicians office. If you are under the care of a heart doctor or specialist doctor, please be aware that you may be asked to them for clearance    You may be asked to stop blood thinners such as Coumadin, Plavix, Fragmin, Lovenox, etc., or any anti-inflammatories such as:  Aspirin, Ibuprofen, Advil, Naproxen prior to your surgery. We also ask that you stop any OTC medications such as fish oil, vitamin E, glucosamine, garlic, Multivitamins, COQ 10, etc.    We ask that you do not smoke 24 hours prior to surgery  We ask that you do not  drink any alcoholic beverages 24 hours prior to surgery     You must make arrangements for a responsible adult to take you home after your surgery. For your safety you will not be allowed to leave alone or drive yourself home. Your surgery will be cancelled if you do not have a ride home. Also for your safety, it is strongly suggested that someone stay with you the first 24 hours after your surgery. A parent or legal guardian must accompany a child scheduled for surgery and plan to stay at the hospital until the child is discharged. Please do not bring other children with you.     For your comfort, please wear simple loose fitting clothing to the hospital.  Please do not bring valuables. Do not wear any make-up or nail polish on your fingers or toes      For your safety, please do not wear any jewelry or body piercing's on the day of surgery. All jewelry must be removed. If you have dentures, they will be removed before going to operating room. For your convenience, we will provide you with a container. If you wear contact lenses or glasses, they will be removed, please bring a case for them. If you have a living will and a durable power of  for healthcare, please bring in a copy. As part of our patient safety program to minimize surgical site infections, we ask you to do the following:    · Please notify your surgeon if you develop any illness between         now and the  day of your surgery. · This includes a cough, cold, fever, sore throat, nausea,         or vomiting, and diarrhea, etc.  ·  Please notify your surgeon if you experience dizziness, shortness         of breath or blurred vision between now and the time of your surgery. Do not shave your operative site 96 hours prior to surgery. For face and neck surgery, men may use an electric razor 48 hours   prior to surgery. You may shower the night before surgery or the morning of   your surgery with an antibacterial soap. You will need to bring a photo ID and insurance card    [facility] has an onsite pharmacy, would you like to utilize our pharmacy     If you will be staying overnight and use a C-pap machine, please bring   your C-pap to hospital     Our goal is to provide you with excellent care, therefore, visitors will be limited to two(2) in the room at a time so that we may focus on providing this care for you. Please contact pre-admission testing if you have any further questions.                  [facility] phone number:  [phone] PAT fax number:  [phone]  Please note these are generalized instructions for all surgical cases, you may be provided with more specific instructions according to your surgery. Preoperative Screening for Elective Surgery/Invasive Procedures While COVID-19 present in the community     Have you tested positive or have been told to self-isolate for COVID-19 like symptoms within the past 28 days? no   Do you currently have any of the following symptoms? o Fever >100.0 F or 99.9 F in immunocompromised patients? n  o New onset cough, shortness of breath or difficulty breathing?n  o New onset sore throat, myalgia (muscle aches and pains), headache, loss of taste/smell or diarrhea?n   Have you had a potential exposure to COVID-19 within the past 14 days by:  o Close contact with a confirmed case?n  o Close contact with a healthcare worker,  or essential infrastructure worker (grocery store, [employer], gas station, public utilities or transportation)? YES  o Do you reside in a congregate setting such as; skilled nursing facility, adult home, correctional facility, homeless shelter or other institutional setting? n  o Have you had recent travel to a known COVID-19 hotspot? Resides in [facility] if the patient has a positive screen by answering yes to one or more of the above questions. Patients who test positive or screen positive prior to surgery or on the day of surgery should be evaluated in conjunction with the surgeon/proceduralist/anesthesiologist to determine the urgency of the procedure.

## 2021-01-12 ENCOUNTER — ANESTHESIA EVENT (OUTPATIENT)
Dept: OPERATING ROOM | Age: 61
End: 2021-01-12
Payer: COMMERCIAL

## 2021-01-13 ENCOUNTER — HOSPITAL ENCOUNTER (OUTPATIENT)
Age: 61
Setting detail: OUTPATIENT SURGERY
Discharge: HOME | End: 2021-01-13
Attending: ORTHOPAEDIC SURGERY | Admitting: ORTHOPAEDIC SURGERY
Payer: COMMERCIAL

## 2021-01-13 ENCOUNTER — TELEPHONE (OUTPATIENT)
Dept: ORTHOPEDIC SURGERY | Age: 61
End: 2021-01-13

## 2021-01-13 ENCOUNTER — HOSPITAL ENCOUNTER (OUTPATIENT)
Dept: PHYSICAL THERAPY | Age: 61
Setting detail: THERAPIES SERIES
Discharge: HOME | End: 2021-01-13
Payer: COMMERCIAL

## 2021-01-13 ENCOUNTER — ANESTHESIA (OUTPATIENT)
Dept: OPERATING ROOM | Age: 61
End: 2021-01-13
Payer: COMMERCIAL

## 2021-01-13 VITALS
SYSTOLIC BLOOD PRESSURE: 147 MMHG | OXYGEN SATURATION: 91 % | RESPIRATION RATE: 3 BRPM | DIASTOLIC BLOOD PRESSURE: 87 MMHG

## 2021-01-13 VITALS
BODY MASS INDEX: 27.7 KG/M2 | HEIGHT: 74 IN | SYSTOLIC BLOOD PRESSURE: 142 MMHG | DIASTOLIC BLOOD PRESSURE: 76 MMHG | OXYGEN SATURATION: 97 % | HEART RATE: 70 BPM | TEMPERATURE: 97 F | WEIGHT: 215.83 LBS | RESPIRATION RATE: 14 BRPM

## 2021-01-13 DIAGNOSIS — Z96.651 STATUS POST TOTAL KNEE REPLACEMENT, RIGHT: Primary | ICD-10-CM

## 2021-01-13 PROCEDURE — 3700000000 HC ANESTHESIA ATTENDED CARE: Performed by: ORTHOPAEDIC SURGERY

## 2021-01-13 PROCEDURE — 2500000003 HC RX 250 WO HCPCS: Performed by: NURSE ANESTHETIST, CERTIFIED REGISTERED

## 2021-01-13 PROCEDURE — 6370000000 HC RX 637 (ALT 250 FOR IP): Performed by: ANESTHESIOLOGY

## 2021-01-13 PROCEDURE — 2580000003 HC RX 258: Performed by: ANESTHESIOLOGY

## 2021-01-13 PROCEDURE — 97110 THERAPEUTIC EXERCISES: CPT

## 2021-01-13 PROCEDURE — 6360000002 HC RX W HCPCS: Performed by: NURSE ANESTHETIST, CERTIFIED REGISTERED

## 2021-01-13 PROCEDURE — 3600000002 HC SURGERY LEVEL 2 BASE: Performed by: ORTHOPAEDIC SURGERY

## 2021-01-13 PROCEDURE — 7100000000 HC PACU RECOVERY - FIRST 15 MIN: Performed by: ORTHOPAEDIC SURGERY

## 2021-01-13 PROCEDURE — 3700000001 HC ADD 15 MINUTES (ANESTHESIA): Performed by: ORTHOPAEDIC SURGERY

## 2021-01-13 PROCEDURE — 3600000012 HC SURGERY LEVEL 2 ADDTL 15MIN: Performed by: ORTHOPAEDIC SURGERY

## 2021-01-13 PROCEDURE — 7100000001 HC PACU RECOVERY - ADDTL 15 MIN: Performed by: ORTHOPAEDIC SURGERY

## 2021-01-13 PROCEDURE — 7100000011 HC PHASE II RECOVERY - ADDTL 15 MIN: Performed by: ORTHOPAEDIC SURGERY

## 2021-01-13 PROCEDURE — 97140 MANUAL THERAPY 1/> REGIONS: CPT

## 2021-01-13 PROCEDURE — 7100000010 HC PHASE II RECOVERY - FIRST 15 MIN: Performed by: ORTHOPAEDIC SURGERY

## 2021-01-13 RX ORDER — FENTANYL CITRATE 50 UG/ML
25 INJECTION, SOLUTION INTRAMUSCULAR; INTRAVENOUS EVERY 5 MIN PRN
Status: DISCONTINUED | OUTPATIENT
Start: 2021-01-13 | End: 2021-01-13 | Stop reason: HOSPADM

## 2021-01-13 RX ORDER — OXYCODONE HYDROCHLORIDE AND ACETAMINOPHEN 5; 325 MG/1; MG/1
1 TABLET ORAL PRN
Status: COMPLETED | OUTPATIENT
Start: 2021-01-13 | End: 2021-01-13

## 2021-01-13 RX ORDER — SODIUM CHLORIDE 9 MG/ML
INJECTION, SOLUTION INTRAVENOUS CONTINUOUS
Status: DISCONTINUED | OUTPATIENT
Start: 2021-01-13 | End: 2021-01-13 | Stop reason: HOSPADM

## 2021-01-13 RX ORDER — PROPOFOL 10 MG/ML
INJECTION, EMULSION INTRAVENOUS PRN
Status: DISCONTINUED | OUTPATIENT
Start: 2021-01-13 | End: 2021-01-13 | Stop reason: SDUPTHER

## 2021-01-13 RX ORDER — SODIUM CHLORIDE 0.9 % (FLUSH) 0.9 %
10 SYRINGE (ML) INJECTION EVERY 12 HOURS SCHEDULED
Status: DISCONTINUED | OUTPATIENT
Start: 2021-01-13 | End: 2021-01-13 | Stop reason: HOSPADM

## 2021-01-13 RX ORDER — ONDANSETRON 2 MG/ML
4 INJECTION INTRAMUSCULAR; INTRAVENOUS
Status: DISCONTINUED | OUTPATIENT
Start: 2021-01-13 | End: 2021-01-13 | Stop reason: HOSPADM

## 2021-01-13 RX ORDER — GLYCOPYRROLATE 0.2 MG/ML
INJECTION INTRAMUSCULAR; INTRAVENOUS PRN
Status: DISCONTINUED | OUTPATIENT
Start: 2021-01-13 | End: 2021-01-13 | Stop reason: SDUPTHER

## 2021-01-13 RX ORDER — MIDAZOLAM HYDROCHLORIDE 1 MG/ML
INJECTION INTRAMUSCULAR; INTRAVENOUS PRN
Status: DISCONTINUED | OUTPATIENT
Start: 2021-01-13 | End: 2021-01-13 | Stop reason: SDUPTHER

## 2021-01-13 RX ORDER — OXYCODONE HYDROCHLORIDE 5 MG/1
5 TABLET ORAL EVERY 6 HOURS PRN
Qty: 40 TABLET | Refills: 0 | Status: SHIPPED | OUTPATIENT
Start: 2021-01-13 | End: 2021-01-23

## 2021-01-13 RX ORDER — SODIUM CHLORIDE 0.9 % (FLUSH) 0.9 %
10 SYRINGE (ML) INJECTION PRN
Status: DISCONTINUED | OUTPATIENT
Start: 2021-01-13 | End: 2021-01-13 | Stop reason: HOSPADM

## 2021-01-13 RX ORDER — LIDOCAINE HYDROCHLORIDE 20 MG/ML
INJECTION, SOLUTION EPIDURAL; INFILTRATION; INTRACAUDAL; PERINEURAL PRN
Status: DISCONTINUED | OUTPATIENT
Start: 2021-01-13 | End: 2021-01-13 | Stop reason: SDUPTHER

## 2021-01-13 RX ORDER — KETAMINE HCL IN NACL, ISO-OSM 100MG/10ML
SYRINGE (ML) INJECTION PRN
Status: DISCONTINUED | OUTPATIENT
Start: 2021-01-13 | End: 2021-01-13 | Stop reason: SDUPTHER

## 2021-01-13 RX ORDER — OXYCODONE HYDROCHLORIDE AND ACETAMINOPHEN 5; 325 MG/1; MG/1
2 TABLET ORAL PRN
Status: COMPLETED | OUTPATIENT
Start: 2021-01-13 | End: 2021-01-13

## 2021-01-13 RX ADMIN — MIDAZOLAM 2 MG: 1 INJECTION INTRAMUSCULAR; INTRAVENOUS at 08:17

## 2021-01-13 RX ADMIN — PROPOFOL 120 MG: 10 INJECTION, EMULSION INTRAVENOUS at 08:23

## 2021-01-13 RX ADMIN — SODIUM CHLORIDE: 9 INJECTION, SOLUTION INTRAVENOUS at 07:39

## 2021-01-13 RX ADMIN — LIDOCAINE HYDROCHLORIDE 60 MG: 20 INJECTION, SOLUTION EPIDURAL; INFILTRATION; INTRACAUDAL; PERINEURAL at 08:23

## 2021-01-13 RX ADMIN — GLYCOPYRROLATE 0.2 MG: 0.2 INJECTION, SOLUTION INTRAMUSCULAR; INTRAVENOUS at 08:18

## 2021-01-13 RX ADMIN — Medication 30 MG: at 08:23

## 2021-01-13 RX ADMIN — OXYCODONE HYDROCHLORIDE AND ACETAMINOPHEN 1 TABLET: 5; 325 TABLET ORAL at 09:29

## 2021-01-13 ASSESSMENT — PAIN DESCRIPTION - PAIN TYPE: TYPE: SURGICAL PAIN

## 2021-01-13 ASSESSMENT — PAIN DESCRIPTION - FREQUENCY: FREQUENCY: CONTINUOUS

## 2021-01-13 ASSESSMENT — PULMONARY FUNCTION TESTS
PIF_VALUE: 1
PIF_VALUE: 11
PIF_VALUE: 1

## 2021-01-13 ASSESSMENT — PAIN DESCRIPTION - ONSET: ONSET: ON-GOING

## 2021-01-13 ASSESSMENT — ENCOUNTER SYMPTOMS: SHORTNESS OF BREATH: 0

## 2021-01-13 ASSESSMENT — PAIN SCALES - GENERAL: PAINLEVEL_OUTOF10: 4

## 2021-01-13 ASSESSMENT — PAIN DESCRIPTION - LOCATION: LOCATION: KNEE

## 2021-01-13 ASSESSMENT — PAIN DESCRIPTION - DESCRIPTORS: DESCRIPTORS: ACHING

## 2021-01-13 ASSESSMENT — LIFESTYLE VARIABLES: SMOKING_STATUS: 1

## 2021-01-13 ASSESSMENT — PAIN - FUNCTIONAL ASSESSMENT
PAIN_FUNCTIONAL_ASSESSMENT: ACTIVITIES ARE NOT PREVENTED
PAIN_FUNCTIONAL_ASSESSMENT: 0-10

## 2021-01-13 NOTE — OP NOTE
Patient: Angelo Gray  YOB: 1960  MRN: [id number]    DATE OF PROCEDURE: 1/13/2021      PREOPERATIVE DIAGNOSIS:  Arthrofibrosis, status post bilateral total knee arthroplasty. POSTOPERATIVE DIAGNOSIS:  Arthrofibrosis, status post bilateral total knee arthroplasty. OPERATION PERFORMED:  Examination under anesthesia and manipulation of bilateral total knee. SURGEON:  Moncho Henry MD     ESTIMATED BLOOD LOSS:  Minimal.     INDICATIONS:  The patient is a 61 y.o. male who underwent right total knee arthroplasty on 11/4/2020 and left total knee arthroplasty on 12/1/2020. He struggled with range of motion secondary to pain and now is here for manipulation and examination under anesthesia. There have been no other issues. No signs of sepsis, instability and x-rays look satisfactory. OPERATIVE PROCEDURE:  The patient was brought to the operating room. Once anesthetic was obtained, each knee was manipulated from full extension to near calf-on-thigh flexion. Multiple adhesions were released as each knee was placed in the flexion movement. Each knee was bent back-and-forth several times. It remained stable both in the varus-valgus and anterior-posterior planes. The patient was awoken and brought to recovery room in good condition.      Right knee flexion after manipulation:        Left knee flexion after manipulation:

## 2021-01-13 NOTE — TELEPHONE ENCOUNTER
FAXED ( IN Epic ) [facility] / [address] ( [address] ) 11/04/2020 OP REPORT TO [facility] MEDIAL RECORDS MANAGEMENT @ [phone]

## 2021-01-13 NOTE — FLOWSHEET NOTE
[facility] - Outpatient Rehabilitation & Therapy  [address] [facility]. [address] [place] [address]  Phone: [phone]   Fax:     [phone]      Physical Therapy Treatment Note/ Progress Report:     Date:  2021    Patient Name:  Minerva Chowdhury  \"SCOTT\"  :  1960  MRN: [id number]    Pertinent Medical History:     Medical/Treatment Diagnosis Information:  · Diagnosis: Status post total left knee replacement (Z64.135  · Treatment Diagnosis: Gait and mobility dysfuction s/p L TKA    Insurance/Certification information:  PT Insurance Information: whoactually #[id number] from 20 - 21    Physician Information:  Referring Practitioner: Dr. Chaudhry Balloon of care signed (Y/N): routed 20     Date of Patient follow up with Physician:      Progress Report: []  Yes  [x]  No     Date Range for reporting period:  Beginnin2021  Ending:      Progress report due (10 Rx/or 30 days whichever is less):     Recertification due (POC duration/ or 90 days whichever is less):     Visit # POC/Insurance Allowable Auth Needed   7  [x]Yes   []No     Latex Allergy:  [x]NO      []YES  Preferred Language for Healthcare:   [x]English       []Other:    Functional Scale:      Date assessed: at eval  Test: PFCF60  Score:    Pain level:  1/10 on R and 3/10 on L     History of Injury:  PT had bilateral manipulations on 21,  Left knee is the most recent surgery. Disregard d/c note due to     SUBJECTIVE:  See eval  20: Pt reports that his greatest problem is limited ROM. 20: \"About the same as yesterday. \" Not sleeping well.  20: Pt reports less pain but states that the left knee just feels restriction, \"like the inside of the knee and left side is locked and super glued. \" Lateral portion is numb,  20: \"Sore after treatment on Monday. Scar tissue is still so tight. \" Still numbness on lateral knee.    20 Pt reports no significant change with his pain. 1/13/21  Pt states, \" sore from just being manipulated. \"  He did both of them \"  OBJECTIVE:   Observation:    Test measurements:  12/23/20: -4-85                                        12/24/20:  -2-92                                        12/28/20: -4-101                                         12/30/20: 0-102                                          12/31/20  2-101   1/13/21  Post manipulation, pre treatment   Left- 90, ext- -20 , right-115, ext--10      RESTRICTIONS/PRECAUTIONS: Recent L TKA    Exercises/Interventions:     Therapeutic Ex (87913)   Min: Reps/Resistance Notes/CUES   Nu step 10 min    Leg press  6 holes showing   recumbent bike S15 X 10 min after rx    incline 3 x 30 sec    QSS 3 X 30 sec    Mini squats  20X    TKE 30 x each (2# on R)    Prone flex X 20 ea, 60 x 3 ea stretching with contract relax    pilates press 3sr x 4 min,     r+l     Seated flex 3.5k x 40 ea    Qs bolster under carine;ls X 5 min              Mat ex     Strap assist  Knee flex 5\" 25 x    Strap assist quad setting 10\" x 12     Seated curls          Seated chair assist knee flexion  30\" x 4          Manual Intervention (13037)  Min: 10 min    Knee mobs/PROM Knee flex and ext to tolerance     Tib/Fem Mobs     Patella Mobs gr4    Ankle mobs       assisted Prom bilat with 30 sec hold x 10 min    Prone prom X 5 min         NMR re-education (22706)  Min:  CUES NEEDED             Therapeutic Activity (68785)  Min:               Modalities  Min:     IFC with      CP after exercises     MH after exercises            Other Therapeutic Activities: Pt was educated on PT POC, Diagnosis, Prognosis, pathomechanics as well as frequency and duration of scheduling future physical therapy appointments. Time was also taken on this day to answer all patient questions and participation in PT. Reviewed appointment policy in detail with patient and patient verbalized understanding.      Home Exercise Program: HEP instruction: Access Code: [email or web address]   URL: [email or web address]   Date: 12/21/2020   Prepared by: Chance Henriquez     Exercises   · Seated Knee Flexion Stretch - 4 reps - 1 sets - 30 hold - 2x daily - 7x weekly   Access Code: [facility]   URL: [email or web address]   Date: 12/31/2020   Prepared by: Chance Henriquez     Exercises   Alternating Single Leg Balance - Foot Behind - 10 reps - 2 sets - 10 hold - 1x daily - 3x weekly   Squat - 15 reps - 2 sets - 5 hold - 1x daily - 3x weekly   Lateral Step Up - 15 reps - 2 sets - 3 hold - 1x daily - 3x weekly   Step Up - 10 reps - 2 sets - 3 hold - 1x daily - 3x weekly     The patient demonstrated good tolerance to and understanding of the HEP. Written instructions have been issued. Patient was instructed in the following for HEP:     . Patient verbalized/demonstrated understanding and was issued written handout. Therapeutic Exercise and NMR EXR  [x] (37162) Provided verbal/tactile cueing for activities related to strengthening, flexibility, endurance, ROM for improvements in LE, proximal hip, and core control with self care, mobility, lifting, ambulation.  [] (63189) Provided verbal/tactile cueing for activities related to improving balance, coordination, kinesthetic sense, posture, motor skill, proprioception  to assist with LE, proximal hip, and core control in self care, mobility, lifting, ambulation and eccentric single leg control.      NMR and Therapeutic Activities:    [] (44417 or 43199) Provided verbal/tactile cueing for activities related to improving balance, coordination, kinesthetic sense, posture, motor skill, proprioception and motor activation to allow for proper function of core, proximal hip and LE with self care and ADLs and functional mobility.   [] (32582) Gait Re-education- Provided training and instruction to the patient for proper LE, core and proximal hip recruitment and positioning and eccentric body weight control with ambulation re-education including up and down stairs     Home Exercise Program:    [x] (24243) Reviewed/Progressed HEP activities related to strengthening, flexibility, endurance, ROM of core, proximal hip and LE for functional self-care, mobility, lifting and ambulation/stair navigation   [] (76617)Reviewed/Progressed HEP activities related to improving balance, coordination, kinesthetic sense, posture, motor skill, proprioception of core, proximal hip and LE for self care, mobility, lifting, and ambulation/stair navigation      Manual Treatments:  PROM / STM / Oscillations-Mobs:  G-I, II, III, IV (PA's, Inf., Post.)  [x] (24813) Provided manual therapy to mobilize LE, proximal hip and/or LS spine soft tissue/joints for the purpose of modulating pain, promoting relaxation,  increasing ROM, reducing/eliminating soft tissue swelling/inflammation/restriction, improving soft tissue extensibility and allowing for proper ROM for normal function with self care, mobility, lifting and ambulation. If [facility] Please Indicate Time In/Out  CPT Code Time in Time out                         Charges:  Timed Code Treatment Minutes: 70   Total Treatment Minutes: 70      [] EVAL (LOW) 06776 (typically 20 minutes face-to-face)  [] EVAL (MOD) 95507 (typically 30 minutes face-to-face)  [] EVAL (HIGH) 94210 (typically 45 minutes face-to-face)  [] RE-EVAL     [x] IK(91744) x  3  [] Dry needle 1 or 2 Muscles (36020)  [] NMR (36688) x     [] Dry needle 3+ Muscles (91367)  [x] Manual (04435) x   1  [] Ultrasound (29265) x  [] TA (84391) x     [] [facility] T[facility] (82209)  [] ES(attended) (84397)     [] ES (un) (00233):   [] Vasopump (57144) [] Ionto (48342)   [] Other:    GOALS:Short Term Goals: To be achieved in: 2 weeks  1. Independent in HEP and progression per patient tolerance, in order to prevent re-injury. [x]? Progressing: [x]? Met: []? Not Met: []? Adjusted  2.  Patient will have a decrease in pain to facilitate improvement in movement, function, and ADLs as indicated by Functional Deficits. []? Progressing: []? Met: [x]? Not Met: []? Adjusted     Long Term Goals: To be achieved in: 6 weeks  1. Disability index score of 42 or better for the LEFS to assist with reaching prior level of function. [x]? Progressing: []? Met: []? Not Met: []? Adjusted  2. Patient will demonstrate increased AROM to 3-110 to allow for proper joint functioning as indicated by patients Functional Deficits. [x]? Progressing: []? Met: [x]? Not Met: []? Adjusted  3. Patient will demonstrate an increase in Strength to good proximal hip strength and control, within 5lb HHD in LE to allow for proper functional mobility as indicated by patients Functional Deficits. [x]? Progressing: []? Met: [x]? Not Met: []? Adjusted  4. Pt will ambulate a community level without a device, ascend/ descend stirs taking reciprocal steps   [x]? Progressing: []? Met: [x]? Not Met: []? Adjusted     ASSESSMENT:  See eval    Treatment/Activity Tolerance:  [x] Patient tolerated treatment well [] Patient limited by fatique  [] Patient limited by pain  [] Patient limited by other medical complications  [] Other:     Overall Progression Towards Functional goals/ Treatment Progress Update:  [] Patient is progressing as expected towards functional goals listed. [] Progression is slowed due to complexities/Impairments listed. [] Progression has been slowed due to co-morbidities. [x] Plan just implemented, too soon to assess goals progression <30days   [] Goals require adjustment due to lack of progress  [] Patient is not progressing as expected and requires additional follow up with physician  [] Other    Prognosis for POC: [x] Good [] Fair  [] Poor    Patient requires continued skilled intervention: [] Yes  [x] No        PLAN: Continue as above.  One more session , then DC.     [] Continue per plan of care [] Alter current plan (see comments)  [] Plan of care initiated [] Hold pending MD visit [x] Discharge    Electronically signed by: Marylee Ness, PT    Note: If patient does not return for scheduled/recommended follow up visits, this note will serve as a discharge from care along with the most recent update on progress.

## 2021-01-13 NOTE — ANESTHESIA POSTPROCEDURE EVALUATION
Department of Anesthesiology  Postprocedure Note    Patient: Willie Bonilla  MRN: [id number]  YOB: 1960  Date of evaluation: 1/13/2021  Time:  11:47 AM     Procedure Summary     Date: 01/13/21 Room / Location:  [facility] [address] [place] [address]    Anesthesia Start: 7504 Anesthesia Stop: 4092    Procedure: BILATERAL  KNEE MANIPULATION (Left ) Diagnosis:       Arthrofibrosis of knee joint, left      (LEFT ARTHOFIBROSIS KNEE)    Surgeons: Leah Yuen MD Responsible Provider: Neil Medeiros MD    Anesthesia Type: general ASA Status: 2          Anesthesia Type: general    Augustus Phase I: Augustus Score: 8    Augustus Phase II: Augustus Score: 10    Last vitals: Reviewed and per EMR flowsheets.        Anesthesia Post Evaluation    Patient location during evaluation: bedside  Patient participation: complete - patient participated  Level of consciousness: awake  Pain score: 2  Airway patency: patent  Nausea & Vomiting: no nausea and no vomiting  Complications: no  Cardiovascular status: blood pressure returned to baseline  Respiratory status: acceptable  Hydration status: euvolemic

## 2021-01-13 NOTE — ANESTHESIA PRE PROCEDURE
Department of Anesthesiology  Preprocedure Note       Name:  Roula Manley   Age:  61 y.o.  :  1960                                          MRN:  [id number]         Date:  2021      Surgeon: Scott Conn):  Faina Costello MD    Procedure: Procedure(s):  LEFT KNEE MANIPULATION    Medications prior to admission:   Prior to Admission medications    Medication Sig Start Date End Date Taking? Authorizing Provider   Naproxen Sodium (ALEVE PO) Take by mouth    Historical Provider, MD       Current medications:    No current facility-administered medications for this visit. No current outpatient medications on file.      Facility-Administered Medications Ordered in Other Visits   Medication Dose Route Frequency Provider Last Rate Last Admin    0.9 % sodium chloride infusion   Intravenous Continuous Iliana Reddy MD        sodium chloride flush 0.9 % injection 10 mL  10 mL Intravenous 2 times per day Iliana Reddy MD        sodium chloride flush 0.9 % injection 10 mL  10 mL Intravenous PRN Iliana Reddy MD           Allergies:  No Known Allergies    Problem List:    Patient Active Problem List   Diagnosis Code    Arthritis of left knee M17.12    Status post total knee replacement, right Z96.651       Past Medical History:        Diagnosis Date    Arthritis     Lab test positive for detection of COVID-19 virus     2020       Past Surgical History:        Procedure Laterality Date    HERNIA REPAIR Right     inguinal hernia X 2    KNEE ARTHROSCOPY Left     KNEE ARTHROSCOPY Right     TOTAL KNEE ARTHROPLASTY Right 2020    ROBOTIC ASSISTED RIGHT TOTAL KNEE REPLACEMENT performed by Emily Colvin MD at [address] [place] [address]. Bilateral 2020    LEFT TOTAL KNEE REPLACEMENT ROBOTIC ASSISTED, AND MANIPULATION RIGHT KNEE performed by Emily Colvin MD at [address] [place] [address] History:    Social History     Tobacco Use  Smoking status: Current Some Day Smoker     Packs/day: 0.00     Years: 15.00     Pack years: 0.00     Types: Cigars    Smokeless tobacco: Never Used    Tobacco comment: rare- 3 a month if that   Substance Use Topics    Alcohol use: Yes     Comment: Occ                                 Ready to quit: Not Answered  Counseling given: Not Answered  Comment: rare- 3 a month if that      Vital Signs (Current): There were no vitals filed for this visit. BP Readings from Last 3 Encounters:   01/08/21 118/78   12/01/20 107/66   12/01/20 122/75       NPO Status:                                                                                 BMI:   Wt Readings from Last 3 Encounters:   01/11/21 215 lb (97.5 kg)   01/08/21 221 lb (100.2 kg)   01/07/21 220 lb (99.8 kg)     There is no height or weight on file to calculate BMI.    CBC:   Lab Results   Component Value Date    WBC 5.7 11/20/2020    RBC 4.38 11/20/2020    HGB 13.2 11/20/2020    HCT 39.2 11/20/2020    MCV 89.5 11/20/2020    RDW 13.1 11/20/2020     11/20/2020       CMP:   Lab Results   Component Value Date     11/20/2020    K 4.2 11/20/2020    K 4.6 06/13/2020     11/20/2020    CO2 25 11/20/2020    BUN 18 11/20/2020    CREATININE 0.8 11/20/2020    GFRAA >60 11/20/2020    AGRATIO 1.8 06/13/2020    LABGLOM >60 11/20/2020    LABGLOM 70.9 06/24/2011    GLUCOSE 93 11/20/2020    GLUCOSE 89 06/24/2011    PROT 6.5 06/13/2020    PROT 6.5 06/24/2011    CALCIUM 9.0 11/20/2020    BILITOT 0.4 06/13/2020    ALKPHOS 52 06/13/2020    AST 15 06/13/2020    ALT 13 06/13/2020       POC Tests: No results for input(s): POCGLU, POCNA, POCK, POCCL, POCBUN, POCHEMO, POCHCT in the last 72 hours.     Coags:   Lab Results   Component Value Date    PROTIME 12.5 11/20/2020    INR 1.08 11/20/2020    APTT 32.2 11/20/2020       HCG (If Applicable): No results found for: PREGTESTUR, PREGSERUM, HCG, HCGQUANT ABGs: No results found for: PHART, PO2ART, TZS7AUL, HAW5QZW, BEART, N2EMBHRD     Type & Screen (If Applicable):  No results found for: LABABO, LABRH    Drug/Infectious Status (If Applicable):  No results found for: HIV, HEPCAB    COVID-19 Screening (If Applicable):   Lab Results   Component Value Date    COVID19 Not Detected 01/08/2021    COVID19 DETECTED 10/09/2020         Anesthesia Evaluation  Patient summary reviewed no history of anesthetic complications:   Airway: Mallampati: II  TM distance: >3 FB   Neck ROM: full  Mouth opening: > = 3 FB Dental:      Comment: No loose teeth    Pulmonary: breath sounds clear to auscultation  (+) current smoker    (-) COPD, asthma, shortness of breath, recent URI and sleep apnea                          ROS comment: Had covid in 9/2020, mild symptoms, \"states felt like a head cold\". Patient continued to test positive through November. He underwent right TKA on 11/4/2020    Patient remains asymptomatic    Cardiovascular:        (-) hypertension, valvular problems/murmurs, past MI, CAD, CABG/stent, dysrhythmias,  angina,  CHF, orthopnea and murmur    ECG reviewed  Rhythm: regular  Rate: normal                    Neuro/Psych:      (-) seizures, neuromuscular disease, TIA, CVA, headaches and psychiatric history           GI/Hepatic/Renal:        (-) GERD, PUD, hepatitis, liver disease, no renal disease and bowel prep       Endo/Other:    (+) : arthritis:., .    (-) diabetes mellitus, hypothyroidism, hyperthyroidism, blood dyscrasia               Abdominal:           Vascular:                                          Anesthesia Plan      general     ASA 2       Induction: intravenous. MIPS: Prophylactic antiemetics administered. Anesthetic plan and risks discussed with patient. Plan discussed with CRNA.     Attending anesthesiologist reviewed and agrees with Pre Eval content          This pre-anesthesia assessment may be used as a history and physical. DOS STAFF ADDENDUM:    Pt seen and examined, chart reviewed (including anesthesia, drug and allergy history). No interval changes to history and physical examination. Anesthetic plan, risks, benefits, alternatives, and personnel involved discussed with patient. Patient verbalized an understanding and agrees to proceed.       Melany Bowman MD  January 13, 2021  7:16 AM    Melany Bowman MD   1/13/2021

## 2021-01-13 NOTE — H&P
Update History & Physical    The patient's History and Physical of January 8, 2021 was reviewed with the patient and I examined the patient. Plan for left knee manipulation. He also has a recent right TKA and reports that this is stiff as well. Right knee manipulation added to consent. The surgical site was confirmed by the patient and me. Plan: The risks, benefits, expected outcome, and alternative to the recommended procedure have been discussed with the patient. Patient understands and wants to proceed with the procedure.      Electronically signed by Roque Menard MD on 1/13/2021 at 8:07 AM

## 2021-01-13 NOTE — PROGRESS NOTES
Pt arrived to PACU from OR. Pt sleeping on 3l/nc. Oral airway d/c'd upon arrival. +pulses noted. Ice pack applied to bilateral knees.

## 2021-01-14 ENCOUNTER — HOSPITAL ENCOUNTER (OUTPATIENT)
Dept: PHYSICAL THERAPY | Age: 61
Setting detail: THERAPIES SERIES
Discharge: HOME | End: 2021-01-14
Payer: COMMERCIAL

## 2021-01-14 ENCOUNTER — APPOINTMENT (OUTPATIENT)
Dept: PHYSICAL THERAPY | Age: 61
End: 2021-01-14
Payer: COMMERCIAL

## 2021-01-14 PROCEDURE — 97110 THERAPEUTIC EXERCISES: CPT

## 2021-01-14 PROCEDURE — 97140 MANUAL THERAPY 1/> REGIONS: CPT

## 2021-01-14 NOTE — FLOWSHEET NOTE
[facility] - Outpatient Rehabilitation & Therapy  [address]. 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 Juarez Never of care signed (Y/N): routed 20     Date of Patient follow up with Physician:      Progress Report: []  Yes  [x]  No     Date Range for reporting period:  Beginnin2021  Ending:      Progress report due (10 Rx/or 30 days whichever is less):     Recertification due (POC duration/ or 90 days whichever is less):     Visit # POC/Insurance Allowable Auth Needed   8  [x]Yes   []No     Latex Allergy:  [x]NO      []YES  Preferred Language for Healthcare:   [x]English       []Other:    Functional Scale:      Date assessed: at eval  Test: HKAP42  Score:    Pain level:  1/10 on R and 3/10 on L     History of Injury:  PT had bilateral manipulations on 21,  Left knee is the most recent surgery. Disregard d/c note due to     SUBJECTIVE:  See eval  20: Pt reports that his greatest problem is limited ROM. 20: \"About the same as yesterday. \" Not sleeping well.  20: Pt reports less pain but states that the left knee just feels restriction, \"like the inside of the knee and left side is locked and super glued. \" Lateral portion is numb,  20: \"Sore after treatment on Monday. Scar tissue is still so tight. \" Still numbness on lateral knee.    20 Pt reports no significant change with his pain. 1/13/21  Pt states, \" sore from just being manipulated.  \"  He did both of them \"  1/14/21  Pt states, \" It feels like nothing is blocking my knee from moving now and the muscles are working much better \"  OBJECTIVE:   Observation:    Test measurements:  12/23/20: -4-85                                        12/24/20:  -2-92                                        12/28/20: -4-101                                         12/30/20: 0-102                                          12/31/20  2-101   1/13/21  Post manipulation, pre treatment   Left- 90, ext- -20 , right-115, ext--10     1/14/21  Post rx  Left- flex-115, ext-5, right -125, ext-0    RESTRICTIONS/PRECAUTIONS: Recent L TKA    Exercises/Interventions:     Therapeutic Ex (07240)   Min: Reps/Resistance Notes/CUES   Nu step 10 min    Leg press Seat 5 80# x 40 6 holes showing   recumbent bike S14 X 10 min after rx    incline 3 x 30 sec    QSS 3 X 30 sec     squats  20X    TKE 30 x each (2# on R)    Prone flex X 20 ea, 60 x 3 ea stretching with contract relax    pilates press 3sr x 5 min, r+l 2sp x 30, assisted end range ext left    r+l     Seated flex 3.5k x 40 ea    Qs bolster under carine;ls X 5 min    Step ups          Mat ex     Strap assist  Knee flex 5\" 25 x    Strap assist quad setting 10\" x 12     Seated curls          Seated chair assist knee flexion  30\" x 4          Manual Intervention (44708)  Min: 10 min    Knee mobs/PROM Knee flex and ext to tolerance     Tib/Fem Mobs     Patella Mobs gr4    Ankle mobs medicups to bilat knees, more on left      assisted Prom bilat with 30 sec hold x 30 min total    Prone prom          NMR re-education (30515)  Min:  CUES NEEDED             Therapeutic Activity (79177)  Min:               Modalities  Min:     IFC with      CP after exercises     MH after exercises            Other Therapeutic Activities: Pt was educated on PT POC, Diagnosis, Prognosis, pathomechanics as well as Other:    GOALS:Short Term Goals: To be achieved in: 2 weeks  1. Independent in HEP and progression per patient tolerance, in order to prevent re-injury. [x]? Progressing: [x]? Met: []? Not Met: []? Adjusted  2. Patient will have a decrease in pain to facilitate improvement in movement, function, and ADLs as indicated by Functional Deficits. []? Progressing: []? Met: [x]? Not Met: []? Adjusted     Long Term Goals: To be achieved in: 6 weeks  1. Disability index score of 42 or better for the LEFS to assist with reaching prior level of function. [x]? Progressing: []? Met: []? Not Met: []? Adjusted  2. Patient will demonstrate increased AROM to 3-110 to allow for proper joint functioning as indicated by patients Functional Deficits. [x]? Progressing: []? Met: [x]? Not Met: []? Adjusted  3. Patient will demonstrate an increase in Strength to good proximal hip strength and control, within 5lb HHD in LE to allow for proper functional mobility as indicated by patients Functional Deficits. [x]? Progressing: []? Met: [x]? Not Met: []? Adjusted  4. Pt will ambulate a community level without a device, ascend/ descend stirs taking reciprocal steps   [x]? Progressing: []? Met: [x]? Not Met: []? Adjusted     ASSESSMENT:  See eval    Treatment/Activity Tolerance:  [x] Patient tolerated treatment well [] Patient limited by fatique  [] Patient limited by pain  [] Patient limited by other medical complications  [x] Other:  1/14/21  Pt did well today, gained range from yesterday, still tight in quads and around knee on the left. Overall Progression Towards Functional goals/ Treatment Progress Update:  [] Patient is progressing as expected towards functional goals listed. [] Progression is slowed due to complexities/Impairments listed. [] Progression has been slowed due to co-morbidities.   [x] Plan just implemented, too soon to assess goals progression <30days   [] Goals require adjustment due to lack of progress  [] Patient is not progressing as expected and requires additional follow up with physician  [] Other    Prognosis for POC: [x] Good [] Fair  [] Poor    Patient requires continued skilled intervention: [] Yes  [x] No        PLAN: Continue as above. One more session , then DC.     [] Continue per plan of care [] Alter current plan (see comments)  [] Plan of care initiated [] Hold pending MD visit [x] Discharge    Electronically signed by: Kirsten Mancera PT    Note: If patient does not return for scheduled/recommended follow up visits, this note will serve as a discharge from care along with the most recent update on progress.

## 2021-01-15 ENCOUNTER — TELEPHONE (OUTPATIENT)
Dept: INTERNAL MEDICINE CLINIC | Age: 61
End: 2021-01-15

## 2021-01-15 ENCOUNTER — HOSPITAL ENCOUNTER (OUTPATIENT)
Dept: PHYSICAL THERAPY | Age: 61
Setting detail: THERAPIES SERIES
Discharge: HOME | End: 2021-01-15
Payer: COMMERCIAL

## 2021-01-15 PROCEDURE — 97140 MANUAL THERAPY 1/> REGIONS: CPT

## 2021-01-15 PROCEDURE — 97110 THERAPEUTIC EXERCISES: CPT

## 2021-01-15 NOTE — FLOWSHEET NOTE
[facility] - Outpatient Rehabilitation & Therapy  [address]. [place]  Phone: [phone]   Fax:     [phone]      Physical Therapy Treatment Note/ Progress Report:     Date:  1/15/2021    Patient Name:  Minerva Spearsster  \"SCOTT\"  :  1960  MRN: [id number]    Pertinent Medical History:     Medical/Treatment Diagnosis Information:  · Diagnosis: Status post total left knee replacement (Y40.504  · Treatment Diagnosis: Gait and mobility dysfuction s/p L TKA    Insurance/Certification information:  PT Insurance Information: [employer] [facility] #[id number] from 20 - 21    Physician Information:  Referring Practitioner: Dr. Chaudhry Balloon of care signed (Y/N): routed 20     Date of Patient follow up with Physician:      Progress Report: []  Yes  [x]  No     Date Range for reporting period:  Beginnin/15/2021  Ending:      Progress report due (10 Rx/or 30 days whichever is less):     Recertification due (POC duration/ or 90 days whichever is less):     Visit # POC/Insurance Allowable Auth Needed   9  [x]Yes   []No     Latex Allergy:  [x]NO      []YES  Preferred Language for Healthcare:   [x]English       []Other:    Functional Scale:      Date assessed: at eval  Test: LEFS32  Score:    Pain level:  1/10 on R and 3/10 on L     History of Injury:  PT had bilateral manipulations on 21,  Left knee is the most recent surgery. Disregard d/c note due to     SUBJECTIVE:  See eval  20: Pt reports that his greatest problem is limited ROM. 20: \"About the same as yesterday. \" Not sleeping well.  20: Pt reports less pain but states that the left knee just feels restriction, \"like the inside of the knee and left side is locked and super glued. \" Lateral portion is numb,  20: \"Sore after treatment on Monday. Scar tissue is still so tight. \" Still numbness on lateral knee.    20 Pt reports no significant change with his pain. 1/13/21  Pt states, \" sore from just being manipulated.  \"  He did both of them \"  1/14/21  Pt states, \" It feels like nothing is blocking my knee from moving now and the muscles are working much better \"  1/15/21  Pt states, \" Just sore from yesterday \"  OBJECTIVE:   Observation:    Test measurements:  12/23/20: -4-85                                        12/24/20:  -2-92                                        12/28/20: -4-101                                         12/30/20: 0-102                                          12/31/20  2-101   1/13/21  Post manipulation, pre treatment   Left- 90, ext- -20 , right-115, ext--10     1/14/21  Post rx  Left- flex-115, ext-5, right -125, ext-0   1/15/21  Post rx  Left- flex-115, ext--5,  Right- 125, ext-0    RESTRICTIONS/PRECAUTIONS: Recent L TKA    Exercises/Interventions:     Therapeutic Ex (85309)   Min: Reps/Resistance Notes/CUES   Nu step 10 min    Leg press Seat 5 80# x 40 6 holes showing   recumbent bike S14     incline 3 x 30 sec    QSS 3 X 30 sec     squats      TKE 30 x each (2# on R)    Prone flex X 20 ea, 60 x 3 ea stretching with contract relax    pilates press 3sr x 5 min, r+l 2sp x 30, assisted end range ext left    r+l     Seated flex     Qs bolster under carine;ls X 5 min    Step ups          Mat ex     Strap assist  Knee flex 5\" 25 x    Strap assist quad setting 10\" x 12     Seated curls          Seated chair assist knee flexion  30\" x 4     Standing kick backs X 20 ea    Manual Intervention ([address] [facility])  Min: 10 min    Knee mobs/PROM Knee flex and ext to tolerance     Tib/Fem Mobs     Patella Mobs gr4    Ankle mobs medicups to bilat knees, more on left      assisted Prom bilat with 30 sec hold x 30 min total    Prone prom          NMR re-education (38522)  Min:  CUES NEEDED             Therapeutic Activity (18704)  Min:               Modalities  Min:     IFC with      CP after exercises     MH after exercises Other Therapeutic Activities: Pt was educated on PT POC, Diagnosis, Prognosis, pathomechanics as well as frequency and duration of scheduling future physical therapy appointments. Time was also taken on this day to answer all patient questions and participation in PT. Reviewed appointment policy in detail with patient and patient verbalized understanding. Home Exercise Program: HEP instruction: Access Code: [email or web address]   URL: [email or web address]   Date: 12/21/2020   Prepared by: Etta Figueroa     Exercises   · Seated Knee Flexion Stretch - 4 reps - 1 sets - 30 hold - 2x daily - 7x weekly   Access Code: [facility]   URL: [email or web address]   Date: 12/31/2020   Prepared by: Etta Figueroa     Exercises   Alternating Single Leg Balance - Foot Behind - 10 reps - 2 sets - 10 hold - 1x daily - 3x weekly   Squat - 15 reps - 2 sets - 5 hold - 1x daily - 3x weekly   Lateral Step Up - 15 reps - 2 sets - 3 hold - 1x daily - 3x weekly   Step Up - 10 reps - 2 sets - 3 hold - 1x daily - 3x weekly     The patient demonstrated good tolerance to and understanding of the HEP. Written instructions have been issued. Patient was instructed in the following for HEP:     . Patient verbalized/demonstrated understanding and was issued written handout. Therapeutic Exercise and NMR EXR  [x] (50717) Provided verbal/tactile cueing for activities related to strengthening, flexibility, endurance, ROM for improvements in LE, proximal hip, and core control with self care, mobility, lifting, ambulation.  [] (95789) Provided verbal/tactile cueing for activities related to improving balance, coordination, kinesthetic sense, posture, motor skill, proprioception  to assist with LE, proximal hip, and core control in self care, mobility, lifting, ambulation and eccentric single leg control.      NMR and Therapeutic Activities:    [] (86285 or 14888) Provided verbal/tactile cueing for activities related to improving balance, (88064)  [] ES(attended) (33199)     [] ES (un) (10619):   [] Vasopump (99332) [] Ionto (83429)   [] Other:    GOALS:Short Term Goals: To be achieved in: 2 weeks  1. Independent in HEP and progression per patient tolerance, in order to prevent re-injury. [x]? Progressing: [x]? Met: []? Not Met: []? Adjusted  2. Patient will have a decrease in pain to facilitate improvement in movement, function, and ADLs as indicated by Functional Deficits. []? Progressing: []? Met: [x]? Not Met: []? Adjusted     Long Term Goals: To be achieved in: 6 weeks  1. Disability index score of 42 or better for the LEFS to assist with reaching prior level of function. [x]? Progressing: []? Met: []? Not Met: []? Adjusted  2. Patient will demonstrate increased AROM to 3-110 to allow for proper joint functioning as indicated by patients Functional Deficits. [x]? Progressing: []? Met: [x]? Not Met: []? Adjusted  3. Patient will demonstrate an increase in Strength to good proximal hip strength and control, within 5lb HHD in LE to allow for proper functional mobility as indicated by patients Functional Deficits. [x]? Progressing: []? Met: [x]? Not Met: []? Adjusted  4. Pt will ambulate a community level without a device, ascend/ descend stirs taking reciprocal steps   [x]? Progressing: []? Met: [x]? Not Met: []? Adjusted     ASSESSMENT:  See eval    Treatment/Activity Tolerance:  [x] Patient tolerated treatment well [] Patient limited by fatique  [] Patient limited by pain  [] Patient limited by other medical complications  [x] Other:  1/14/21  Pt did well today, gained range from yesterday, still tight in quads and around knee on the left. Overall Progression Towards Functional goals/ Treatment Progress Update:  [] Patient is progressing as expected towards functional goals listed. [] Progression is slowed due to complexities/Impairments listed. [] Progression has been slowed due to co-morbidities.   [x] Plan just implemented, too soon to assess goals progression <30days   [] Goals require adjustment due to lack of progress  [] Patient is not progressing as expected and requires additional follow up with physician  [] Other    Prognosis for POC: [x] Good [] Fair  [] Poor    Patient requires continued skilled intervention: [] Yes  [x] No        PLAN: Continue as above. One more session , then DC.     [] Continue per plan of care [] Alter current plan (see comments)  [] Plan of care initiated [] Hold pending MD visit [x] Discharge    Electronically signed by: Mendel Idol, PT    Note: If patient does not return for scheduled/recommended follow up visits, this note will serve as a discharge from care along with the most recent update on progress.

## 2021-01-15 NOTE — TELEPHONE ENCOUNTER
[facility] Initial Follow Up Call    Outreach made within 2 business days of discharge: Yes    Patient: Hao Mulligan Patient : 1960   MRN: <[id number]>  Reason for Admission: There are no discharge diagnoses documented for the most recent discharge. Discharge Date: 21       Spoke with: Pavithra Chaidez    Discharge department/facility: [facility] Patient Contact:  Was patient able to fill all prescriptions: Yes  Was patient instructed to bring all medications to the follow-up visit:   Is patient taking all medications as directed in the discharge summary?  Yes  Does patient understand their discharge instructions: Yes  Does patient have questions or concerns that need addressed prior to 7-14 day follow up office visit: no    Scheduled appointment with PCP within 7-14 days    Follow Up  Future Appointments   Date Time Provider Wilma Drew   2021  8:30 AM Kenyetta Gamino MD  [phone] [place], [place]

## 2021-01-21 ENCOUNTER — TELEPHONE (OUTPATIENT)
Dept: ORTHOPEDIC SURGERY | Age: 61
End: 2021-01-21

## 2021-01-21 NOTE — TELEPHONE ENCOUNTER
Other PATIENT CALLING W/ QUESTIONS REGARDING L KNEE, STATES HE HAS INFLAMMATION CALLBACK [phone].

## 2021-01-28 NOTE — FLOWSHEET NOTE
[facility] signed (Y/N): routed 20     Date of Patient follow up with Physician:      Progress Report: []  Yes  [x]  No     Date Range for reporting period:  Beginnin2021  Ending:      Progress report due (10 Rx/or 30 days whichever is less): 84/10/90    Recertification due (POC duration/ or 90 days whichever is less):     Visit # POC/Insurance Allowable Auth Needed   9  [x]Yes   []No     Latex Allergy:  [x]NO      []YES  Preferred Language for Healthcare:   [x]English       []Other:    Functional Scale:      Date assessed: at eval  Test: NJQU39  Score:    Pain level:  1/10 on R and 3/10 on L     History of Injury:  PT had bilateral manipulations on 21,  Left knee is the most recent surgery. Disregard d/c note due to     SUBJECTIVE:  See eval  20: Pt reports that his greatest problem is limited ROM. 20: \"About the same as yesterday. \" Not sleeping well.  20: Pt reports less pain but states that the left knee just feels restriction, \"like the inside of the knee and left side is locked and super glued. \" Lateral portion is numb,  20: \"Sore after treatment on Monday. Scar tissue is still so tight. \" Still numbness on lateral knee. 20 Pt reports no significant change with his pain. 21  Pt states, \" sore from just being manipulated.  \"  He did both of them \"  21  Pt states, \" It feels like nothing is blocking my knee from moving now and the muscles are working much better \"  1/15/21  Pt states, \" Just sore from yesterday \"  OBJECTIVE:   Observation:    Test measurements:  20: -4-85                                        20:  -2-92                                        20: -4-101                                         20: 0-102                                          20  2-101   21  Post manipulation, pre treatment   Left- 90, ext- -20 , right-115, ext--10     21  Post rx  Left- flex-115, ext-5, right -125, ext-0   1/15/21  Post rx  Left- flex-115, ext--5,  Right- 125, ext-0    RESTRICTIONS/PRECAUTIONS: Recent L TKA    Exercises/Interventions:     Therapeutic Ex (49079)   Min: Reps/Resistance Notes/CUES   Nu step 10 min    Leg press Seat 5 80# x 40 6 holes showing   recumbent bike S14     incline 3 x 30 sec    QSS 3 X 30 sec     squats      TKE 30 x each (2# on R)    Prone flex X 20 ea, 60 x 3 ea stretching with contract relax    pilates press 3sr x 5 min, r+l 2sp x 30, assisted end range ext left    r+l     Seated flex     Qs bolster under carine;ls X 5 min    Step ups          Mat ex     Strap assist  Knee flex 5\" 25 x    Strap assist quad setting 10\" x 12     Seated curls          Seated chair assist knee flexion  30\" x 4     Standing kick backs X 20 ea    Manual Intervention (44225)  Min: 10 min    Knee mobs/PROM Knee flex and ext to tolerance     Tib/Fem Mobs     Patella Mobs gr4    Ankle mobs medicups to bilat knees, more on left      assisted Prom bilat with 30 sec hold x 30 min total    Prone prom          NMR re-education (59761)  Min:  CUES NEEDED             Therapeutic Activity (54689)  Min:               Modalities  Min:     IFC with      CP after exercises     MH after exercises            Other Therapeutic Activities: Pt was educated on PT POC, Diagnosis, Prognosis, pathomechanics as well as frequency and duration of scheduling future physical therapy appointments. Time was also taken on this day to answer all patient questions and participation in PT. Reviewed appointment policy in detail with patient and patient verbalized understanding. Home Exercise Program: HEP instruction: Access Code: [email or web address]   URL: [email or web address]   Date: 12/21/2020   Prepared by: Nitza Wheatley     Exercises   · Seated Knee Flexion Stretch - 4 reps - 1 sets - 30 hold - 2x daily - 7x weekly   Access Code: [facility]   URL: [email or web address]   Date: 12/31/2020   Prepared by: Nitza Wheatley     Exercises Alternating Single Leg Balance - Foot Behind - 10 reps - 2 sets - 10 hold - 1x daily - 3x weekly   Squat - 15 reps - 2 sets - 5 hold - 1x daily - 3x weekly   Lateral Step Up - 15 reps - 2 sets - 3 hold - 1x daily - 3x weekly   Step Up - 10 reps - 2 sets - 3 hold - 1x daily - 3x weekly     The patient demonstrated good tolerance to and understanding of the HEP. Written instructions have been issued. Patient was instructed in the following for HEP:     . Patient verbalized/demonstrated understanding and was issued written handout. Therapeutic Exercise and NMR EXR  [x] (57949) Provided verbal/tactile cueing for activities related to strengthening, flexibility, endurance, ROM for improvements in LE, proximal hip, and core control with self care, mobility, lifting, ambulation.  [] (19450) Provided verbal/tactile cueing for activities related to improving balance, coordination, kinesthetic sense, posture, motor skill, proprioception  to assist with LE, proximal hip, and core control in self care, mobility, lifting, ambulation and eccentric single leg control.      NMR and Therapeutic Activities:    [] (64284 or 27713) Provided verbal/tactile cueing for activities related to improving balance, coordination, kinesthetic sense, posture, motor skill, proprioception and motor activation to allow for proper function of core, proximal hip and LE with self care and ADLs and functional mobility.   [] (44831) Gait Re-education- Provided training and instruction to the patient for proper LE, core and proximal hip recruitment and positioning and eccentric body weight control with ambulation re-education including up and down stairs     Home Exercise Program:    [x] (84070) Reviewed/Progressed HEP activities related to strengthening, flexibility, endurance, ROM of core, proximal hip and LE for functional self-care, mobility, lifting and ambulation/stair navigation   [] (70749)Reviewed/Progressed HEP activities related to improving balance, coordination, kinesthetic sense, posture, motor skill, proprioception of core, proximal hip and LE for self care, mobility, lifting, and ambulation/stair navigation      Manual Treatments:  PROM / STM / Oscillations-Mobs:  G-I, II, III, IV (PA's, Inf., Post.)  [x] (26122) Provided manual therapy to mobilize LE, proximal hip and/or LS spine soft tissue/joints for the purpose of modulating pain, promoting relaxation,  increasing ROM, reducing/eliminating soft tissue swelling/inflammation/restriction, improving soft tissue extensibility and allowing for proper ROM for normal function with self care, mobility, lifting and ambulation. If BWC Please Indicate Time In/Out  CPT Code Time in Time out                         Charges:  Timed Code Treatment Minutes: 60   Total Treatment Minutes: 60      [] EVAL (LOW) 97432 (typically 20 minutes face-to-face)  [] EVAL (MOD) 68089 (typically 30 minutes face-to-face)  [] EVAL (HIGH) 77383 (typically 45 minutes face-to-face)  [] RE-EVAL     [x] VO(95985) x 2  [] Dry needle 1 or 2 Muscles (79723)  [] NMR (07463) x     [] Dry needle 3+ Muscles (84671)  [x] Manual (19291) x  2 [] Ultrasound (85098) x  [] TA (82679) x     [] Mech Traction (43037)  [] ES(attended) (31736)     [] ES (un) (62512):   [] Vasopump (64958) [] Ionto (82487)   [] Other:    GOALS:Short Term Goals: To be achieved in: 2 weeks  1. Independent in HEP and progression per patient tolerance, in order to prevent re-injury. [x]? Progressing: [x]? Met: []? Not Met: []? Adjusted  2. Patient will have a decrease in pain to facilitate improvement in movement, function, and ADLs as indicated by Functional Deficits. []? Progressing: []? Met: [x]? Not Met: []? Adjusted     Long Term Goals: To be achieved in: 6 weeks  1. Disability index score of 42 or better for the LEFS to assist with reaching prior level of function. []? Progressing: []? Met: [x]? Not Met: []? Adjusted  2.  Patient will demonstrate

## 2021-02-01 ENCOUNTER — OFFICE VISIT (OUTPATIENT)
Dept: ORTHOPEDIC SURGERY | Age: 61
End: 2021-02-01

## 2021-02-01 VITALS — TEMPERATURE: 97 F

## 2021-02-01 DIAGNOSIS — Z96.651 STATUS POST TOTAL KNEE REPLACEMENT, RIGHT: ICD-10-CM

## 2021-02-01 DIAGNOSIS — Z96.652 STATUS POST TOTAL LEFT KNEE REPLACEMENT: Primary | ICD-10-CM

## 2021-02-01 PROCEDURE — 99024 POSTOP FOLLOW-UP VISIT: CPT | Performed by: ORTHOPAEDIC SURGERY

## 2021-02-01 NOTE — PROGRESS NOTES
[facility]  Office Visit    Chief Complaint: Follow-up s/p bilateral total knee arthroplasty and bilateral manipulation    HPI:  Berenice Eaton is a 61 y.o. who is here in follow-up of left total knee arthroplasty performed on December 1, 2020 and right total knee arthroplasty performed on November 4, 2020. He underwent bilateral knee manipulation on January 13, 2021. He reports he has been improving. His right knee is doing very well at this point and the left knee continues to improve. He is taking Aleve twice daily and using Voltaren gel on the left knee. He is working on a home exercise program and also recently joined [employer] to use their bicycles. He also has a recumbent bicycle at home. Patient Active Problem List   Diagnosis    Arthritis of left knee    Status post total knee replacement, right       ROS:  Constitutional: denies fever, chills, weight loss  MSK: denies pain in other joints, muscle aches  Neurological: denies numbness, tingling, weakness    Medications:  Current Outpatient Medications on File Prior to Visit   Medication Sig Dispense Refill    Naproxen Sodium (ALEVE PO) Take by mouth       No current facility-administered medications on file prior to visit. Exam:  Temperature 97 °F (36.1 °C), temperature source Temporal.    Appearance: sitting in exam room chair, appears to be in no acute distress, awake and alert  Resp: unlabored breathing on room air  Skin: warm, dry and intact with out erythema or significant increased temperature  Neuro: grossly intact both lower extremities. Intact sensation to light touch. Motor exam 4+ to 5/5 in all major motor groups. MSK: LLE - Incision is well-healed. Range of motion is 10 to 115 degrees. Sensation is intact light touch. There is brisk capillary refill. Dorsalis pedis and posterior tibial pulses are intact. There is 5/5 muscle strength in all muscle groups. RLE -  Incision is well-healed. Range of motion is 0 to 130 degrees. Sensation is intact light touch. There is brisk capillary refill. Dorsalis pedis and posterior tibial pulses are intact. There is 5/5 muscle strength in all muscle groups. Imaging:  None    Assessment:  S/p left total knee arthroplasty with arthrofibrosis  S/p right total knee arthroplasty with arthrofibrosis  Status post bilateral knee manipulation    Plan:  He is doing well now after both knees were manipulated about 2-1/2 weeks ago. He continues to work on home physical therapy exercises. His pain is well controlled and his medicine continues to improve. I will see him back in 1 month for postop follow-up on both knees with bilateral knee x-rays at that time. This dictation was done with [employer] dictation and may contain mechanical errors related to translation. No

## 2021-03-01 ENCOUNTER — OFFICE VISIT (OUTPATIENT)
Dept: ORTHOPEDIC SURGERY | Age: 61
End: 2021-03-01

## 2021-03-01 VITALS — TEMPERATURE: 97 F

## 2021-03-01 DIAGNOSIS — Z96.651 STATUS POST TOTAL KNEE REPLACEMENT, RIGHT: ICD-10-CM

## 2021-03-01 DIAGNOSIS — Z96.652 STATUS POST TOTAL LEFT KNEE REPLACEMENT: Primary | ICD-10-CM

## 2021-03-01 PROCEDURE — 99024 POSTOP FOLLOW-UP VISIT: CPT | Performed by: ORTHOPAEDIC SURGERY

## 2021-03-01 NOTE — PROGRESS NOTES
[facility]  Office Visit    Chief Complaint: Follow-up s/p bilateral total knee arthroplasty and bilateral manipulation    HPI:  Angelo Gray is a 61 y.o. who is here in follow-up of left total knee arthroplasty performed on December 1, 2020 and right total knee arthroplasty performed on November 4, 2020. He underwent bilateral knee manipulation on January 13, 2021. He continues to improve. The right knee is doing very well and the left knee is coming along. He still does have some swelling in the left knee. He is using Voltaren cream and taking Aleve to help with any pain and swelling. He has been able to increase his activity lately. Patient Active Problem List   Diagnosis    Arthritis of left knee    Status post total knee replacement, right       ROS:  Constitutional: denies fever, chills, weight loss  MSK: denies pain in other joints, muscle aches  Neurological: denies numbness, tingling, weakness    Medications:  Current Outpatient Medications on File Prior to Visit   Medication Sig Dispense Refill    Naproxen Sodium (ALEVE PO) Take by mouth       No current facility-administered medications on file prior to visit. Exam:  Temperature 97 °F (36.1 °C), temperature source Temporal.    Appearance: sitting in exam room chair, appears to be in no acute distress, awake and alert  Resp: unlabored breathing on room air  Skin: warm, dry and intact with out erythema or significant increased temperature  Neuro: grossly intact both lower extremities. Intact sensation to light touch. Motor exam 4+ to 5/5 in all major motor groups. MSK: LLE - Incision is well-healed. Range of motion is 0 to 120 degrees. Sensation is intact light touch. There is brisk capillary refill. Dorsalis pedis and posterior tibial pulses are intact. There is 5/5 muscle strength in all muscle groups. RLE -  Incision is well-healed. Range of motion is 0 to 130 degrees. Sensation is intact light touch. There is brisk capillary refill. Dorsalis pedis and posterior tibial pulses are intact. There is 5/5 muscle strength in all muscle groups. Imaging:  3 views of bilateral knees were performed and interpreted today. Significant for bilateral total knee arthroplasty prostheses in place with no signs of osteolysis, loosening, fracture, dislocation. On the sunrise view, there is lateral tilt of both patella, greater on the right. Assessment:  S/p left total knee arthroplasty with arthrofibrosis  S/p right total knee arthroplasty with arthrofibrosis  Status post bilateral knee manipulation    Plan:  He continues to improve after bilateral total knee arthroplasty and manipulations. He will continue a self-directed physical therapy program. We discussed with the patient today the use of antibiotic prophylaxis prior to any dental procedures. We discussed that the antibiotic prophylaxis would be used to help prevent periprosthetic joint infections. If they were not offered antibiotics by the physician performing the procedure, they should contact our office that we may prescribe them antibiotics. Follow-up on an annual basis sooner if needed. This dictation was done with [employer] dictation and may contain mechanical errors related to translation.

## 2021-03-30 ENCOUNTER — TELEPHONE (OUTPATIENT)
Dept: ORTHOPEDIC SURGERY | Age: 61
End: 2021-03-30

## 2022-04-10 NOTE — PROGRESS NOTES
CLINICAL PHARMACY NOTE: MEDS TO [facility] Patient?: No  Total # of Prescriptions Filled: 3   The following medications were delivered to the patient:  Current Discharge Medication List      START taking these medications    Details   oxyCODONE (ROXICODONE) 5 MG immediate release tablet Take 1-2 tablets by mouth every 6 hours as needed for Pain for up to 5 days. Qty: 40 tablet, Refills: 0    Comments: Reduce doses taken as pain becomes manageable  Associated Diagnoses: Arthritis of right knee      aspirin EC 81 MG EC tablet Take 1 tablet by mouth 2 times daily for 14 days Please avoid missing doses.   Qty: 28 tablet, Refills: 0      cephALEXin (KEFLEX) 500 MG capsule Take 1 capsule by mouth See Admin Instructions Take one capsule at 9pm today and one capsule at 9am tomorrow  Qty: 2 capsule, Refills: 0         ·   ·   Total # of Interventions Completed: 0  Time Spent (min): 30    Additional Documentation: no

## 2023-02-14 NOTE — OP NOTE
[address] [place] [address]                                OPERATIVE REPORT    PATIENT NAME: Nyasia Camarillo            :        1960  MED REC NO:   [id number]                          ROOM:  ACCOUNT NO:   [de-identified]                           ADMIT DATE: 2020  PROVIDER:     Karen Wolff. Matthew Haney MD    DATE OF PROCEDURE:  2020    PREOPERATIVE DIAGNOSIS:  Severe degenerative osteoarthritis of the left  knee. POSTOPERATIVE DIAGNOSIS:  Severe degenerative osteoarthritis of the left  knee. OPERATION PERFORMED:  Robotic-assisted left total knee arthroplasty. With manipulation of R TKA now 4 weeks post op. SURGEON:  Karen Wolff. Matthew Haney MD    ASSISTANTS:  MUSHTAQ Berry; also present in the room, Dr. Melita Katz. ESTIMATED BLOOD LOSS:  Minimal.    INDICATIONS:  The patient is a 59-year-old male who presented with  severe bilateral degenerative osteoarthritis of the knees. He comes for  left total knee arthroplasty, robotically assisted. He is status post  right total knee arthroplasty. OPERATIVE PROCEDURE:First the patient's R knee was manipulated from full extension to 135 degrees of flexion. Multiple adhesions were disrupted. The knee had good ROM and remained stable. Then we proceeded to the L knee arthroplasty. The patient was brought to the operating room. Once anesthetic was obtained and intravenous antibiotics delivered, his  left knee and foot were prepped and draped in a sterile fashion. The  leg was exsanguinated. Tourniquet was placed to 300 mmHg around the  left thigh. An anterior incision was made. Skin and subcutaneous tissue were  divided down to the extensor mechanism. Medial parapatellar arthrotomy  was performed. The patella was everted, measured, and cut to accept an  uncemented 39-mm patella in the press-fit mode.   At this point in time,  then the rest of the knee was · Patient has smoked since she was a teenager  · Recently cut down to 3/4 pack from a pack per day  · Nicotine patch  · Encourage cessation exposed. Two distal femoral pins and two  proximal tibial pins were placed with the robotic aerials. The two  tracker points in the distal femur and proximal tibia were also placed. At this time, then the knee was registered with a third robotic aerial  to the robot, and then the knee was taken through range of motion in  different poses and stresses. With this and virtual manipulation of the  implant, we were able to balance the flexion and extension spaces. Once the patella was measured and cut to accept a 39-mm patella, the  knee was exposed and measured. We balanced the flexion and extension  gaps robotically and then the cutting arm was brought in and then in the  order of posterior femoral condyle, anterior femoral condyle, anterior  femoral chamfer cuts, tibial cut, posterior femoral chamfer cuts, and  then distal femoral condylar cuts were made. All the bony fragments  were removed. The knee was reconstructed in trial form to take a #7  tibia, a #6 cruciate-retaining femur, a 39-mm patella, and a 9-mm tibial  tray insert. With this, the knee came to full extension, calf-on-thigh  flexion, and good overall stability. The patella tracked well. Lateral  release was not performed. There was good overall stability. All trial implants were removed. The ends of the bones were irrigated  off and then, in the order of tibia, femur and patella, the #7 tibial  tray, press-fit was hammered into place. The #6 cruciate-retaining  [place] [employer] uncemented femoral was hammered onto the distal end of  the femur, and then the 39-mm uncemented patella was squeezed into the  back of the patella. Once this was done, the 9-mm tibial tray insert  was placed. The knee came to full extension, excellent flexion and good  overall stability. The patella tracked well. The wound was irrigated out. Hemostasis was obtained. The wound was  injected with ropivacaine and also Exparel.   It was bathed for five  minutes with 3 gm of tranexamic acid and then finally aqueous iodine and  due to his outpatient protocol, 1 gm of vancomycin was placed in the  intra-articular region. The wound was closed in layers. The patient  tolerated the procedure well. After a dressing was applied, he was  brought to recovery room in good condition. VIKAS Esquivel MD    D: 12/01/2020 10:26:39       T: 12/01/2020 13:53:28     [email or web address]  Job#: [id number]     Doc#: [id number]    CC:

## (undated) DEVICE — TOTAL KNEE: Brand: MEDLINE INDUSTRIES, INC.

## (undated) DEVICE — 1010 S-DRAPE TOWEL DRAPE 10/BX: Brand: STERI-DRAPE™

## (undated) DEVICE — KIT INT FIX FEM TIB CKPT MAKOPLASTY

## (undated) DEVICE — CUTTER SURG OD46MM PAT KNEE DISP FOR RM SYS XCELERATE

## (undated) DEVICE — Z DISCONTINUED USE 2368412 KIT POS LEG DISP

## (undated) DEVICE — BANDAGE COMPR W4INXL15FT BGE E SGL LAYERED CLP CLSR

## (undated) DEVICE — SUTURE VCRL SZ 1 L18IN ABSRB UD L36MM CT-1 1/2 CIR J841D

## (undated) DEVICE — KIT DRP FOR RIO ROBOTIC ARM ASST SYS

## (undated) DEVICE — COTTON UNDERCAST PADDING,CRIMPED FINISH: Brand: WEBRIL

## (undated) DEVICE — PAD,NON-ADHERENT,3X8,STERILE,LF,1/PK: Brand: MEDLINE

## (undated) DEVICE — BANDAGE COMPR W6INXL12FT SMOOTH FOR LIMB EXSANG ESMARCH

## (undated) DEVICE — BASIC SINGLE BASIN 1-LF: Brand: MEDLINE INDUSTRIES, INC.

## (undated) DEVICE — KIT TRK KNEE PROC VIZADISC

## (undated) DEVICE — PIN BNE FIX L110MM DIA32MM
Type: IMPLANTABLE DEVICE | Site: KNEE | Status: NON-FUNCTIONAL
Removed: 2020-12-01

## (undated) DEVICE — SOLUTION IV 1000ML 0.9% SOD CHL FOR IRRIG PLAS CONT

## (undated) DEVICE — SUTURE STRATAFIX SPRL SZ 2 0 L14IN ABSRB UD MH L36MM 1 2 CIR SXMD2B401

## (undated) DEVICE — GLOVE ORANGE PI 8 1/2   MSG9085

## (undated) DEVICE — PIN BNE FIX TEMP L140MM DIA4MM MAKO

## (undated) DEVICE — 3M™ COBAN™ NL STERILE NON-LATEX SELF-ADHERENT WRAP, 2083S, 3 IN X 5 YD (7,5 CM X 4,5 M), 24 ROLLS/CASE: Brand: 3M™ COBAN™

## (undated) DEVICE — SUTURE ABSORBABLE MONOFILAMENT 1-0 OS8 14 IN STRATAFIX SPRL SXPD2B202

## (undated) DEVICE — SOLUTION IV IRRIG POUR BRL 0.9% SODIUM CHL 2F7124

## (undated) DEVICE — STERILE TOTAL KNEE DRAPE PACK: Brand: CARDINAL HEALTH

## (undated) DEVICE — Z DISCONTINUED USE 2716304 SUTURE STRATAFIX SPRL SZ 3-0 L12IN ABSRB UD FS-1 L24MM 3/8

## (undated) DEVICE — SOLUTION PREP POVIDONE IOD FOR SKIN MUCOUS MEM PRIOR TO

## (undated) DEVICE — CORD RETRCT SIL

## (undated) DEVICE — GLOVE SURG SZ 85 L12IN FNGR THK13MIL WHT ISOLEX POLYISOPRENE

## (undated) DEVICE — GOWN,REINFORCED,POLY,AURORA,XXLARGE,STR: Brand: MEDLINE

## (undated) DEVICE — BLADE SURG SAW STD S STL OSC W/ SERR EDGE DISP